# Patient Record
Sex: FEMALE | Race: WHITE | Employment: FULL TIME | URBAN - NONMETROPOLITAN AREA
[De-identification: names, ages, dates, MRNs, and addresses within clinical notes are randomized per-mention and may not be internally consistent; named-entity substitution may affect disease eponyms.]

---

## 2017-01-04 ENCOUNTER — TELEPHONE (OUTPATIENT)
Dept: NEUROLOGY | Age: 62
End: 2017-01-04

## 2017-02-06 ENCOUNTER — OFFICE VISIT (OUTPATIENT)
Dept: NEUROLOGY | Age: 62
End: 2017-02-06
Payer: COMMERCIAL

## 2017-02-06 VITALS
BODY MASS INDEX: 29.82 KG/M2 | DIASTOLIC BLOOD PRESSURE: 74 MMHG | HEART RATE: 82 BPM | RESPIRATION RATE: 16 BRPM | WEIGHT: 179 LBS | HEIGHT: 65 IN | SYSTOLIC BLOOD PRESSURE: 117 MMHG

## 2017-02-06 DIAGNOSIS — Z99.89 CPAP (CONTINUOUS POSITIVE AIRWAY PRESSURE) DEPENDENCE: ICD-10-CM

## 2017-02-06 DIAGNOSIS — G47.33 OBSTRUCTIVE SLEEP APNEA: Primary | ICD-10-CM

## 2017-02-06 PROCEDURE — 99213 OFFICE O/P EST LOW 20 MIN: CPT | Performed by: PHYSICIAN ASSISTANT

## 2017-02-06 RX ORDER — LOPERAMIDE HYDROCHLORIDE 2 MG/1
2 CAPSULE ORAL PRN
COMMUNITY

## 2017-02-21 ENCOUNTER — OFFICE VISIT (OUTPATIENT)
Dept: GASTROENTEROLOGY | Facility: CLINIC | Age: 62
End: 2017-02-21

## 2017-02-21 VITALS
HEART RATE: 70 BPM | SYSTOLIC BLOOD PRESSURE: 138 MMHG | BODY MASS INDEX: 29.82 KG/M2 | OXYGEN SATURATION: 74 % | WEIGHT: 179 LBS | DIASTOLIC BLOOD PRESSURE: 72 MMHG | HEIGHT: 65 IN

## 2017-02-21 DIAGNOSIS — R19.7 DIARRHEA, UNSPECIFIED TYPE: Primary | ICD-10-CM

## 2017-02-21 PROCEDURE — 99213 OFFICE O/P EST LOW 20 MIN: CPT | Performed by: INTERNAL MEDICINE

## 2017-02-21 RX ORDER — SODIUM, POTASSIUM,MAG SULFATES 17.5-3.13G
2 SOLUTION, RECONSTITUTED, ORAL ORAL ONCE
Qty: 1 BOTTLE | Refills: 0 | Status: SHIPPED | OUTPATIENT
Start: 2017-02-21 | End: 2017-02-21

## 2017-02-21 NOTE — PROGRESS NOTES
[facility] - Office note  2/21/2017    Edie Oconnor 1955     Chief Complaint   Patient presents with   • GI Problem     Here to discuss diarrhea       HISTORY OF PRESENT ILLNESS    Edie Oconnor is a 61 y.o. female who presents here for evaluation of diarrhea.  Symptoms have persisted.  She returned positive for Campylobacter completed the antibiotics and is continuing to have symptoms.  Essentially having diarrhea daily.  Had been on Celebrex which is been discontinued.  It is not been any blood in her stool.  His been no fever or chills.     Past Medical History   Diagnosis Date   • Carotid occlusion, left    • Depression    • Diverticulitis    • H/O Bell's palsy      Right   • Hiatal hernia    • Neuromuscular disorder      Neuropathy   • TIA (transient ischemic attack)        Past Surgical History   Procedure Laterality Date   • Hysterectomy     • Appendectomy     • Cardiac catheterization     • Elbow open reduction internal fixation Left    • Cholecystectomy     • Upper gastrointestinal endoscopy  08/08/2016     hiatla hernia, [place] Grade B reflux esophagitis   • Colonoscopy  12/10/2012     diverticula of sigmoid, tubular adenoma         Current Outpatient Prescriptions:   •  aspirin  MG tablet, Take 325 mg by mouth Daily., Disp: , Rfl:   •  atorvastatin (LIPITOR) 10 MG tablet, Take 10 mg by mouth Daily., Disp: , Rfl:   •  celecoxib (CeleBREX) 200 MG capsule, Take 200 mg by mouth Daily., Disp: , Rfl:   •  dicyclomine (BENTYL) 20 MG tablet, Take 1 tablet by mouth Every 6 (Six) Hours., Disp: 120 tablet, Rfl: 10  •  esomeprazole (NexIUM) 40 MG capsule, Take 40 mg by mouth Every Morning Before Breakfast., Disp: , Rfl:   •  lisinopril (PRINIVIL,ZESTRIL) 10 MG tablet, Take 10 mg by mouth Daily., Disp: , Rfl:   •  loperamide (IMODIUM) 2 MG capsule, Take 2 mg by mouth As Needed for diarrhea., Disp: , Rfl:   •  sucralfate (CARAFATE) 1 G tablet, Take 1 g by mouth 4 (Four) Times a Day.,  "Disp: , Rfl:   •  sodium-potassium-magnesium sulfates (SUPREP BOWEL PREP) solution oral solution, Take 2 bottles by mouth 1 (One) Time for 1 dose. As directed by office instructions provided., Disp: 1 bottle, Rfl: 0    Allergies   Allergen Reactions   • Prochlorperazine        Social History     Social History   • Marital status:      Spouse name: N/A   • Number of children: N/A   • Years of education: N/A     Occupational History   • Not on file.     Social History Main Topics   • Smoking status: Never Smoker   • Smokeless tobacco: Never Used   • Alcohol use Yes      Comment: rarely   • Drug use: No   • Sexual activity: Not on file     Other Topics Concern   • Not on file     Social History Narrative       Family History   Problem Relation Age of Onset   • Diabetes Mother    • Hypertension Mother    • Cancer Father    • Heart disease Sister    • Cancer Brother    • Colon polyps Brother    • Colon cancer Neg Hx        Review of Systems   Constitutional: Negative.    HENT: Negative.    Eyes: Negative.    Respiratory: Negative.    Cardiovascular: Negative.    Gastrointestinal: Positive for diarrhea. Negative for abdominal distention, abdominal pain, blood in stool and nausea.   Genitourinary: Negative.    Musculoskeletal: Negative.    Neurological: Negative.    Hematological: Negative.        Vitals:    02/21/17 1353   BP: 138/72   Pulse: 70   SpO2: (!) 74%   Weight: 179 lb (81.2 kg)   Height: 65\" (165.1 cm)    Body mass index is 29.79 kg/(m^2).    Physical Exam   Constitutional: She is oriented to person, place, and time. She appears well-developed and well-nourished.   Eyes: Pupils are equal, round, and reactive to light.   Cardiovascular: Normal rate and regular rhythm.    Pulmonary/Chest: Effort normal and breath sounds normal.   Abdominal: Soft. Bowel sounds are normal.   Neurological: She is alert and oriented to person, place, and time.   Skin: Skin is warm and dry.         ASSESSMENT AND PLAN      1. " Diarrhea, unspecified type  I will recheck a stool for infection or think this is unlikely at this time.  Need to consider microscopic colitis and she will need repeat colonoscopy of the right colonic biopsies.    - Gastrointestinal Panel, PCR; Future  - Case request     EMR Dragon/transcription disclaimer: Much of this encounter note is an electronic transcription/translation of spoken language to printed text.  The electronic translation of spoken language may permit erroneous, or at times, nonsensical words or phrases to be inadvertently transcribed.  Although I have reviewed the note for such errors, some may still exist.    Dom Parker MD  2/21/2017  2:20 PM

## 2017-02-22 ENCOUNTER — LAB (OUTPATIENT)
Dept: LAB | Facility: HOSPITAL | Age: 62
End: 2017-02-22
Attending: INTERNAL MEDICINE

## 2017-02-22 DIAGNOSIS — R19.7 DIARRHEA, UNSPECIFIED TYPE: ICD-10-CM

## 2017-02-22 LAB
ADV 40+41 DNA STL QL NAA+NON-PROBE: NOT DETECTED
ASTRO TYP 1-8 RNA STL QL NAA+NON-PROBE: NOT DETECTED
C CAYETANENSIS DNA STL QL NAA+NON-PROBE: NOT DETECTED
C DIFF TOX GENS STL QL NAA+PROBE: NOT DETECTED
CAMPY SP DNA.DIARRHEA STL QL NAA+PROBE: NOT DETECTED
CRYPTOSP STL CULT: NOT DETECTED
E COLI DNA SPEC QL NAA+PROBE: NOT DETECTED
E HISTOLYT AG STL-ACNC: NOT DETECTED
EAEC PAA PLAS AGGR+AATA ST NAA+NON-PRB: NOT DETECTED
EC STX1+STX2 GENES STL QL NAA+NON-PROBE: NOT DETECTED
EPEC EAE GENE STL QL NAA+NON-PROBE: NOT DETECTED
ETEC LTA+ST1A+ST1B TOX ST NAA+NON-PROBE: NOT DETECTED
G LAMBLIA DNA SPEC QL NAA+PROBE: NOT DETECTED
NOROVIRUS GI+II RNA STL QL NAA+NON-PROBE: NOT DETECTED
P SHIGELLOIDES DNA STL QL NAA+NON-PROBE: NOT DETECTED
RV RNA STL NAA+PROBE: NOT DETECTED
SALMONELLA DNA SPEC QL NAA+PROBE: NOT DETECTED
SAPO I+II+IV+V RNA STL QL NAA+NON-PROBE: NOT DETECTED
SHIGELLA SP+EIEC IPAH ST NAA+NON-PROBE: NOT DETECTED
V CHOLERAE DNA SPEC QL NAA+PROBE: NOT DETECTED
VIBRIO DNA SPEC NAA+PROBE: NOT DETECTED
YERSINIA STL CULT: NOT DETECTED

## 2017-02-22 PROCEDURE — 87507 IADNA-DNA/RNA PROBE TQ 12-25: CPT

## 2017-02-23 ENCOUNTER — ANESTHESIA EVENT (OUTPATIENT)
Dept: GASTROENTEROLOGY | Facility: HOSPITAL | Age: 62
End: 2017-02-23

## 2017-02-24 ENCOUNTER — ANESTHESIA (OUTPATIENT)
Dept: GASTROENTEROLOGY | Facility: HOSPITAL | Age: 62
End: 2017-02-24

## 2017-02-24 ENCOUNTER — HOSPITAL ENCOUNTER (OUTPATIENT)
Facility: HOSPITAL | Age: 62
Setting detail: HOSPITAL OUTPATIENT SURGERY
Discharge: HOME | End: 2017-02-24
Attending: INTERNAL MEDICINE | Admitting: INTERNAL MEDICINE

## 2017-02-24 VITALS
SYSTOLIC BLOOD PRESSURE: 99 MMHG | BODY MASS INDEX: 28.82 KG/M2 | DIASTOLIC BLOOD PRESSURE: 72 MMHG | HEIGHT: 65 IN | RESPIRATION RATE: 16 BRPM | TEMPERATURE: 98.6 F | OXYGEN SATURATION: 96 % | HEART RATE: 78 BPM | WEIGHT: 173 LBS

## 2017-02-24 DIAGNOSIS — R19.7 DIARRHEA, UNSPECIFIED TYPE: ICD-10-CM

## 2017-02-24 PROCEDURE — 88305 TISSUE EXAM BY PATHOLOGIST: CPT | Performed by: INTERNAL MEDICINE

## 2017-02-24 PROCEDURE — 25010000002 PROPOFOL 10 MG/ML EMULSION: Performed by: NURSE ANESTHETIST, CERTIFIED REGISTERED

## 2017-02-24 PROCEDURE — 45385 COLONOSCOPY W/LESION REMOVAL: CPT | Performed by: INTERNAL MEDICINE

## 2017-02-24 PROCEDURE — 45380 COLONOSCOPY AND BIOPSY: CPT | Performed by: INTERNAL MEDICINE

## 2017-02-24 RX ORDER — SODIUM CHLORIDE 9 MG/ML
100 INJECTION, SOLUTION INTRAVENOUS CONTINUOUS
Status: DISCONTINUED | OUTPATIENT
Start: 2017-02-24 | End: 2017-02-24 | Stop reason: HOSPADM

## 2017-02-24 RX ORDER — SODIUM CHLORIDE 0.9 % (FLUSH) 0.9 %
1-10 SYRINGE (ML) INJECTION AS NEEDED
Status: DISCONTINUED | OUTPATIENT
Start: 2017-02-24 | End: 2017-02-24 | Stop reason: HOSPADM

## 2017-02-24 RX ORDER — PROPOFOL 10 MG/ML
VIAL (ML) INTRAVENOUS AS NEEDED
Status: DISCONTINUED | OUTPATIENT
Start: 2017-02-24 | End: 2017-02-24 | Stop reason: SURG

## 2017-02-24 RX ADMIN — SODIUM CHLORIDE 100 ML/HR: 9 INJECTION, SOLUTION INTRAVENOUS at 10:17

## 2017-02-24 RX ADMIN — PROPOFOL 50 MG: 10 INJECTION, EMULSION INTRAVENOUS at 11:49

## 2017-02-24 RX ADMIN — PROPOFOL 50 MG: 10 INJECTION, EMULSION INTRAVENOUS at 11:52

## 2017-02-24 RX ADMIN — PROPOFOL 5 MG: 10 INJECTION, EMULSION INTRAVENOUS at 11:46

## 2017-02-24 RX ADMIN — PROPOFOL 50 MG: 10 INJECTION, EMULSION INTRAVENOUS at 11:43

## 2017-02-24 NOTE — ANESTHESIA POSTPROCEDURE EVALUATION
Patient: Edie Oconnor    Procedure Summary     Date Anesthesia Start Anesthesia Stop Room / Location    02/24/17 1141 1159 [facility] ENDOSCOPY 2 / [facility] ENDOSCOPY       Procedure Diagnosis Surgeon Provider    COLONOSCOPY WITH ANESTHESIA (N/A ) Diarrhea, unspecified type  (Diarrhea, unspecified type [R19.7]) MD Farooq Chan CRNA          Anesthesia Type: general, MAC  Last vitals  BP      Temp      Pulse     Resp      SpO2        Post Anesthesia Care and Evaluation    Patient location during evaluation: PACU  Patient participation: complete - patient participated  Level of consciousness: awake and alert  Pain score: 0  Pain management: adequate  Airway patency: patent  Anesthetic complications: No anesthetic complications    Cardiovascular status: acceptable and stable  Respiratory status: acceptable  Hydration status: acceptable

## 2017-02-24 NOTE — ANESTHESIA PREPROCEDURE EVALUATION
Anesthesia Evaluation     Patient summary reviewed and Nursing notes reviewed   NPO Status: > 6 hours   Airway   Mallampati: II  no difficulty expected  Dental      Pulmonary - negative pulmonary ROS   Cardiovascular   Exercise tolerance: good (4-7 METS)    (+) PVD,   (-) hypertension, CAD      Neuro/Psych  (+) TIA, numbness, psychiatric history,    GI/Hepatic/Renal/Endo    (+)  hiatal hernia,     Musculoskeletal     Abdominal    Substance History      OB/GYN          Other                                    Anesthesia Plan    ASA 2     general and MAC     intravenous induction   Anesthetic plan and risks discussed with patient.

## 2017-02-28 LAB
LAB AP CASE REPORT: NORMAL
LAB AP CLINICAL INFORMATION: NORMAL
Lab: NORMAL
PATH REPORT.FINAL DX SPEC: NORMAL
PATH REPORT.GROSS SPEC: NORMAL

## 2017-05-19 ENCOUNTER — OFFICE VISIT (OUTPATIENT)
Dept: NEUROLOGY | Facility: CLINIC | Age: 62
End: 2017-05-19

## 2017-05-19 VITALS
HEIGHT: 65 IN | SYSTOLIC BLOOD PRESSURE: 104 MMHG | DIASTOLIC BLOOD PRESSURE: 78 MMHG | BODY MASS INDEX: 29.32 KG/M2 | HEART RATE: 68 BPM | WEIGHT: 176 LBS

## 2017-05-19 DIAGNOSIS — G47.33 OSA ON CPAP: ICD-10-CM

## 2017-05-19 DIAGNOSIS — Z99.89 OSA ON CPAP: ICD-10-CM

## 2017-05-19 DIAGNOSIS — E78.5 DYSLIPIDEMIA: ICD-10-CM

## 2017-05-19 DIAGNOSIS — I10 ESSENTIAL HYPERTENSION: ICD-10-CM

## 2017-05-19 DIAGNOSIS — G45.9 TRANSIENT CEREBRAL ISCHEMIA, UNSPECIFIED TYPE: Primary | ICD-10-CM

## 2017-05-19 PROCEDURE — 99213 OFFICE O/P EST LOW 20 MIN: CPT | Performed by: CLINICAL NURSE SPECIALIST

## 2017-08-07 ENCOUNTER — OFFICE VISIT (OUTPATIENT)
Dept: NEUROLOGY | Age: 62
End: 2017-08-07
Payer: COMMERCIAL

## 2017-08-07 VITALS
BODY MASS INDEX: 29.41 KG/M2 | DIASTOLIC BLOOD PRESSURE: 82 MMHG | HEIGHT: 65 IN | OXYGEN SATURATION: 94 % | WEIGHT: 176.5 LBS | SYSTOLIC BLOOD PRESSURE: 125 MMHG | HEART RATE: 65 BPM

## 2017-08-07 DIAGNOSIS — Z99.89 CPAP (CONTINUOUS POSITIVE AIRWAY PRESSURE) DEPENDENCE: ICD-10-CM

## 2017-08-07 DIAGNOSIS — G47.33 OBSTRUCTIVE SLEEP APNEA: Primary | ICD-10-CM

## 2017-08-07 PROCEDURE — 99213 OFFICE O/P EST LOW 20 MIN: CPT | Performed by: PHYSICIAN ASSISTANT

## 2017-08-07 RX ORDER — OMEPRAZOLE 20 MG/1
20 CAPSULE, DELAYED RELEASE ORAL DAILY
COMMUNITY
End: 2021-08-11 | Stop reason: ALTCHOICE

## 2018-05-25 ENCOUNTER — OFFICE VISIT (OUTPATIENT)
Dept: NEUROLOGY | Facility: CLINIC | Age: 63
End: 2018-05-25

## 2018-05-25 VITALS
WEIGHT: 177 LBS | HEIGHT: 65 IN | SYSTOLIC BLOOD PRESSURE: 122 MMHG | BODY MASS INDEX: 29.49 KG/M2 | DIASTOLIC BLOOD PRESSURE: 78 MMHG | HEART RATE: 72 BPM

## 2018-05-25 DIAGNOSIS — Z99.89 OSA ON CPAP: ICD-10-CM

## 2018-05-25 DIAGNOSIS — G45.9 TRANSIENT CEREBRAL ISCHEMIA, UNSPECIFIED TYPE: Primary | ICD-10-CM

## 2018-05-25 DIAGNOSIS — I10 ESSENTIAL HYPERTENSION: ICD-10-CM

## 2018-05-25 DIAGNOSIS — I65.23 BILATERAL CAROTID ARTERY STENOSIS: ICD-10-CM

## 2018-05-25 DIAGNOSIS — E78.5 DYSLIPIDEMIA: ICD-10-CM

## 2018-05-25 DIAGNOSIS — G47.33 OSA ON CPAP: ICD-10-CM

## 2018-05-25 PROCEDURE — 99214 OFFICE O/P EST MOD 30 MIN: CPT | Performed by: CLINICAL NURSE SPECIALIST

## 2018-05-25 RX ORDER — ESOMEPRAZOLE MAGNESIUM 40 MG/1
40 CAPSULE, DELAYED RELEASE ORAL
COMMUNITY
End: 2019-03-26

## 2018-05-25 NOTE — PROGRESS NOTES
Subjective     Chief Complaint   Patient presents with   • Stroke       Edie Oconnor is a 63 y.o. female right handed works for insurance company.  She is here today for follow up for TIA. She was last seen 5/2017.  She denies unilateral weakness/numbness, facial weakness, speech or vision changes. She continues ASA with out problems. Patient was not able to tolerate statin.  She did have labs done by PCP but not available to me . She is unsure of LDL.  No falls. She did have carotid duplex 12/2016 at [facility] that showed less than 50% bilateral. She would like me to take over survielance of carotid stenosis. She sees Dr. Alcantara for LAN and wears CPAP. Patient has no new complaints.     Stroke   This is a chronic (TIA in March 2016) problem. Episode onset: 3/2016. The problem has been resolved. Pertinent negatives include no arthralgias, myalgias, numbness or sore throat. Associated symptoms comments: Right facial droop/drooling and resolved RTB and no new symptoms since last follow up. Exacerbated by: HTN, dyslipid. Treatments tried: ASA, statin(but statin d/c secondary to worsesing RLS) The treatment provided significant relief.        Current Outpatient Prescriptions   Medication Sig Dispense Refill   • aspirin  MG tablet Take 325 mg by mouth Daily.     • celecoxib (CeleBREX) 200 MG capsule Take 200 mg by mouth As Needed.     • dicyclomine (BENTYL) 20 MG tablet Take 1 tablet by mouth Every 6 (Six) Hours. 120 tablet 10   • esomeprazole (nexIUM) 40 MG capsule Take 40 mg by mouth Every Morning Before Breakfast.     • lisinopril (PRINIVIL,ZESTRIL) 10 MG tablet Take 10 mg by mouth Daily.     • loperamide (IMODIUM) 2 MG capsule Take 2 mg by mouth As Needed for diarrhea.     • sucralfate (CARAFATE) 1 G tablet Take 1 g by mouth 4 (Four) Times a Day.       No current facility-administered medications for this visit.        Past Medical History:   Diagnosis Date   • Carotid occlusion, left    • Depression    •  "Diverticulitis    • H/O Bell's palsy     Right   • Hiatal hernia    • Neuromuscular disorder     Neuropathy   • PONV (postoperative nausea and vomiting)    • Sleep apnea    • TIA (transient ischemic attack)        Past Surgical History:   Procedure Laterality Date   • APPENDECTOMY     • CARDIAC CATHETERIZATION     • CHOLECYSTECTOMY     • COLONOSCOPY  12/10/2012    diverticula of sigmoid, tubular adenoma   • COLONOSCOPY N/A 2/24/2017    Procedure: COLONOSCOPY WITH ANESTHESIA;  Surgeon: Dom Parker MD;  Location: [facility] ENDOSCOPY;  Service:    • ELBOW OPEN REDUCTION INTERNAL FIXATION Left    • HYSTERECTOMY     • UPPER GASTROINTESTINAL ENDOSCOPY  08/08/2016    hiatla hernia, [place] Grade B reflux esophagitis       family history includes Cancer in her brother and father; Colon polyps in her brother; Diabetes in her mother; Heart disease in her sister; Hypertension in her mother.    Social History   Substance Use Topics   • Smoking status: Never Smoker   • Smokeless tobacco: Never Used   • Alcohol use Yes      Comment: rarely       Review of Systems   Constitutional: Negative.    HENT: Negative.  Negative for postnasal drip, rhinorrhea and sore throat.    Eyes: Negative.    Respiratory: Negative.  Negative for choking and chest tightness.    Cardiovascular: Negative.    Gastrointestinal: Negative for constipation and diarrhea.   Endocrine: Negative.    Genitourinary: Negative.  Negative for dysuria and frequency.   Musculoskeletal: Negative for arthralgias and myalgias.   Skin: Negative.    Allergic/Immunologic: Negative.    Neurological: Negative for speech difficulty and numbness.   Hematological: Negative.  Negative for adenopathy.   Psychiatric/Behavioral: Negative.  Negative for agitation and hallucinations.   All other systems reviewed and are negative.      Objective     /78   Pulse 72   Ht 165.1 cm (65\")   Wt 80.3 kg (177 lb)   BMI 29.45 kg/m² , Body mass index is 29.45 kg/m².    Physical Exam "   Constitutional: She is oriented to person, place, and time. Vital signs are normal. She appears well-developed and well-nourished. She is cooperative.   HENT:   Head: Normocephalic and atraumatic.   Right Ear: Hearing and external ear normal.   Left Ear: Hearing and external ear normal.   Nose: Nose normal.   Mouth/Throat: Oropharynx is clear and moist.   Eyes: EOM and lids are normal. Pupils are equal, round, and reactive to light. Right eye exhibits normal extraocular motion and no nystagmus. Left eye exhibits normal extraocular motion and no nystagmus. Right pupil is round and reactive. Left pupil is round and reactive. Pupils are equal.   Neck: Neck supple. Carotid bruit is not present.   Cardiovascular: Normal rate, regular rhythm, S1 normal, S2 normal and normal heart sounds.    No murmur heard.  Pulmonary/Chest: Effort normal and breath sounds normal. She has no decreased breath sounds. She has no wheezes. She has no rhonchi. She has no rales.   Abdominal: Soft. Bowel sounds are normal.   Musculoskeletal: Normal range of motion.   Neurological: She is alert and oriented to person, place, and time. She has normal strength and normal reflexes. She displays no tremor. No cranial nerve deficit or sensory deficit. She displays a negative Romberg sign. She displays no seizure activity. Coordination (no ataxia/tremor) and gait normal.   Reflex Scores:       Tricep reflexes are 2+ on the right side and 2+ on the left side.       Bicep reflexes are 2+ on the right side and 2+ on the left side.       Brachioradialis reflexes are 2+ on the right side and 2+ on the left side.       Patellar reflexes are 2+ on the right side and 2+ on the left side.       Achilles reflexes are 2+ on the right side and 2+ on the left side.  Awake, alert. No aphasia, no dysarthria  Completes simple and complex commands    CN II:  Visual fields full.  Pupils equally reactive to light  CN III, IV, VI:  Extraocular Muscles full with no signs  of nystagmus  CN V:  Facial sensory is symmetric with no asymetries.  CN VII:  Facial motor symmetric  CN VIII:  Gross hearing intact bilaterally  CN IX:  Palate elevates symmetrically  CN X:  Palate elevates symmetrically  CN XI:  Shoulder shrug symmetric  CN XII:  Tongue is midline on protrusion    Full and symmetric strength bilateral upper and lower extremities.    NIHSS = 0   Skin: Skin is warm and dry.   Psychiatric: She has a normal mood and affect. Her speech is normal and behavior is normal. Cognition and memory are normal.   Nursing note and vitals reviewed.           ASSESSMENT/PLAN    Diagnoses and all orders for this visit:    Transient cerebral ischemia, unspecified type  -     US Carotid Bilateral; Future    Essential hypertension    LAN on CPAP    Dyslipidemia    Bilateral carotid artery stenosis  -     US Carotid Bilateral; Future    Other orders  -     esomeprazole (nexIUM) 40 MG capsule; Take 40 mg by mouth Every Morning Before Breakfast.          MEDICAL DECISION MAKIN. Continue with  mg daily for secondary stroke prevention.  2. Patient intolerant of statins  3. bp managed by PCP and to maintain systolic less than 130.  4. Patient is counseled on stroke signs and symptoms using FAST and Time Saved is Brain Saved.  5, will get Carotid duplex scan for surveillance of bilateral carotid stenosis.  6.Patient's Body mass index is 29.45 kg/m². BMI is above normal parameters. Recommendations include: educational material.      7. Continue with CPAP per Dr. Alcantara recommendation.     allergies and all known medications/prescriptions have been reviewed using resources available on this encounter.    Return in about 1 year (around 2019).        JONEL Banuelos

## 2018-05-25 NOTE — PATIENT INSTRUCTIONS
Exercising to Lose Weight  Exercising can help you to lose weight. In order to lose weight through exercise, you need to do vigorous-intensity exercise. You can tell that you are exercising with vigorous intensity if you are breathing very hard and fast and cannot hold a conversation while exercising.  Moderate-intensity exercise helps to maintain your current weight. You can tell that you are exercising at a moderate level if you have a higher heart rate and faster breathing, but you are still able to hold a conversation.  How often should I exercise?  Choose an activity that you enjoy and set realistic goals. Your health care provider can help you to make an activity plan that works for you. Exercise regularly as directed by your health care provider. This may include:  · Doing resistance training twice each week, such as:  ¨ Push-ups.  ¨ Sit-ups.  ¨ Lifting weights.  ¨ Using resistance bands.  · Doing a given intensity of exercise for a given amount of time. Choose from these options:  ¨ 150 minutes of moderate-intensity exercise every week.  ¨ 75 minutes of vigorous-intensity exercise every week.  ¨ A mix of moderate-intensity and vigorous-intensity exercise every week.  Children, pregnant women, people who are out of shape, people who are overweight, and older adults may need to consult a health care provider for individual recommendations. If you have any sort of medical condition, be sure to consult your health care provider before starting a new exercise program.  What are some activities that can help me to lose weight?  · Walking at a rate of at least 4.5 miles an hour.  · Jogging or running at a rate of 5 miles per hour.  · Biking at a rate of at least 10 miles per hour.  · Lap swimming.  · Roller-skating or in-line skating.  · Cross-country skiing.  · Vigorous competitive sports, such as football, basketball, and soccer.  · Jumping rope.  · Aerobic dancing.  How can I be more active in my day-to-day  activities?  · Use the stairs instead of the elevator.  · Take a walk during your lunch break.  · If you drive, park your car farther away from work or school.  · If you take public transportation, get off one stop early and walk the rest of the way.  · Make all of your phone calls while standing up and walking around.  · Get up, stretch, and walk around every 30 minutes throughout the day.  What guidelines should I follow while exercising?  · Do not exercise so much that you hurt yourself, feel dizzy, or get very short of breath.  · Consult your health care provider prior to starting a new exercise program.  · Wear comfortable clothes and shoes with good support.  · Drink plenty of water while you exercise to prevent dehydration or heat stroke. Body water is lost during exercise and must be replaced.  · Work out until you breathe faster and your heart beats faster.  This information is not intended to replace advice given to you by your health care provider. Make sure you discuss any questions you have with your health care provider.  Document Released: 01/20/2012 Document Revised: 05/25/2017 Document Reviewed: 05/21/2015  [employer] Interactive Patient Education © 2017 [employer]  Calorie Counting for Weight Loss  Calories are units of energy. Your body needs a certain amount of calories from food to keep you going throughout the day. When you eat more calories than your body needs, your body stores the extra calories as fat. When you eat fewer calories than your body needs, your body burns fat to get the energy it needs.  Calorie counting means keeping track of how many calories you eat and drink each day. Calorie counting can be helpful if you need to lose weight. If you make sure to eat fewer calories than your body needs, you should lose weight. Ask your health care provider what a healthy weight is for you.  For calorie counting to work, you will need to eat the right number of calories in a day in order to  lose a healthy amount of weight per week. A dietitian can help you determine how many calories you need in a day and will give you suggestions on how to reach your calorie goal.  · A healthy amount of weight to lose per week is usually 1-2 lb (0.5-0.9 kg). This usually means that your daily calorie intake should be reduced by 500-750 calories.  · Eating 1,200 - 1,500 calories per day can help most women lose weight.  · Eating 1,500 - 1,800 calories per day can help most men lose weight.  What is my plan?  My goal is to have __________ calories per day.  If I have this many calories per day, I should lose around __________ pounds per week.  What do I need to know about calorie counting?  In order to meet your daily calorie goal, you will need to:  · Find out how many calories are in each food you would like to eat. Try to do this before you eat.  · Decide how much of the food you plan to eat.  · Write down what you ate and how many calories it had. Doing this is called keeping a food log.  To successfully lose weight, it is important to balance calorie counting with a healthy lifestyle that includes regular activity. Aim for 150 minutes of moderate exercise (such as walking) or 75 minutes of vigorous exercise (such as running) each week.  Where do I find calorie information?     The number of calories in a food can be found on a Nutrition Facts label. If a food does not have a Nutrition Facts label, try to look up the calories online or ask your dietitian for help.  Remember that calories are listed per serving. If you choose to have more than one serving of a food, you will have to multiply the calories per serving by the amount of servings you plan to eat. For example, the label on a package of bread might say that a serving size is 1 slice and that there are 90 calories in a serving. If you eat 1 slice, you will have eaten 90 calories. If you eat 2 slices, you will have eaten 180 calories.  How do I keep a food  "log?  Immediately after each meal, record the following information in your food log:  · What you ate. Don't forget to include toppings, sauces, and other extras on the food.  · How much you ate. This can be measured in cups, ounces, or number of items.  · How many calories each food and drink had.  · The total number of calories in the meal.  Keep your food log near you, such as in a small notebook in your pocket, or use a mobile ze or website. Some programs will calculate calories for you and show you how many calories you have left for the day to meet your goal.  What are some calorie counting tips?  · Use your calories on foods and drinks that will fill you up and not leave you hungry:  ¨ Some examples of foods that fill you up are nuts and nut butters, vegetables, lean proteins, and high-fiber foods like whole grains. High-fiber foods are foods with more than 5 g fiber per serving.  ¨ Drinks such as sodas, specialty coffee drinks, alcohol, and juices have a lot of calories, yet do not fill you up.  · Eat nutritious foods and avoid empty calories. Empty calories are calories you get from foods or beverages that do not have many vitamins or protein, such as candy, sweets, and soda. It is better to have a nutritious high-calorie food (such as an avocado) than a food with few nutrients (such as a bag of chips).  · Know how many calories are in the foods you eat most often. This will help you calculate calorie counts faster.  · Pay attention to calories in drinks. Low-calorie drinks include water and unsweetened drinks.  · Pay attention to nutrition labels for \"low fat\" or \"fat free\" foods. These foods sometimes have the same amount of calories or more calories than the full fat versions. They also often have added sugar, starch, or salt, to make up for flavor that was removed with the fat.  · Find a way of tracking calories that works for you. Get creative. Try different apps or programs if writing down calories " does not work for you.  What are some portion control tips?  · Know how many calories are in a serving. This will help you know how many servings of a certain food you can have.  · Use a measuring cup to measure serving sizes. You could also try weighing out portions on a kitchen scale. With time, you will be able to estimate serving sizes for some foods.  · Take some time to put servings of different foods on your favorite plates, bowls, and cups so you know what a serving looks like.  · Try not to eat straight from a bag or box. Doing this can lead to overeating. Put the amount you would like to eat in a cup or on a plate to make sure you are eating the right portion.  · Use smaller plates, glasses, and bowls to prevent overeating.  · Try not to multitask (for example, watch TV or use your computer) while eating. If it is time to eat, sit down at a table and enjoy your food. This will help you to know when you are full. It will also help you to be aware of what you are eating and how much you are eating.  What are tips for following this plan?  Reading food labels   · Check the calorie count compared to the serving size. The serving size may be smaller than what you are used to eating.  · Check the source of the calories. Make sure the food you are eating is high in vitamins and protein and low in saturated and trans fats.  Shopping   · Read nutrition labels while you shop. This will help you make healthy decisions before you decide to purchase your food.  · Make a grocery list and stick to it.  Cooking   · Try to cook your favorite foods in a healthier way. For example, try baking instead of frying.  · Use low-fat dairy products.  Meal planning   · Use more fruits and vegetables. Half of your plate should be fruits and vegetables.  · Include lean proteins like poultry and fish.  How do I count calories when eating out?  · Ask for smaller portion sizes.  · Consider sharing an entree and sides instead of getting  your own entree.  · If you get your own entree, eat only half. Ask for a box at the beginning of your meal and put the rest of your entree in it so you are not tempted to eat it.  · If calories are listed on the menu, choose the lower calorie options.  · Choose dishes that include vegetables, fruits, whole grains, low-fat dairy products, and lean protein.  · Choose items that are boiled, broiled, grilled, or steamed. Stay away from items that are buttered, battered, fried, or served with cream sauce. Items labeled “crispy” are usually fried, unless stated otherwise.  · Choose water, low-fat milk, unsweetened iced tea, or other drinks without added sugar. If you want an alcoholic beverage, choose a lower calorie option such as a glass of wine or light beer.  · Ask for dressings, sauces, and syrups on the side. These are usually high in calories, so you should limit the amount you eat.  · If you want a salad, choose a garden salad and ask for grilled meats. Avoid extra toppings like phelan, cheese, or fried items. Ask for the dressing on the side, or ask for olive oil and vinegar or lemon to use as dressing.  · Estimate how many servings of a food you are given. For example, a serving of cooked rice is ½ cup or about the size of half a baseball. Knowing serving sizes will help you be aware of how much food you are eating at restaurants. The list below tells you how big or small some common portion sizes are based on everyday objects:  ¨ 1 oz--4 stacked dice.  ¨ 3 oz--1 deck of cards.  ¨ 1 tsp--1 die.  ¨ 1 Tbsp--½ a ping-pong ball.  ¨ 2 Tbsp--1 ping-pong ball.  ¨ ½ cup--½ baseball.  ¨ 1 cup--1 baseball.  Summary  · Calorie counting means keeping track of how many calories you eat and drink each day. If you eat fewer calories than your body needs, you should lose weight.  · A healthy amount of weight to lose per week is usually 1-2 lb (0.5-0.9 kg). This usually means reducing your daily calorie intake by 500-750  calories.  · The number of calories in a food can be found on a Nutrition Facts label. If a food does not have a Nutrition Facts label, try to look up the calories online or ask your dietitian for help.  · Use your calories on foods and drinks that will fill you up, and not on foods and drinks that will leave you hungry.  · Use smaller plates, glasses, and bowls to prevent overeating.  This information is not intended to replace advice given to you by your health care provider. Make sure you discuss any questions you have with your health care provider.  Document Released: 12/18/2006 Document Revised: 11/17/2017 Document Reviewed: 11/17/2017  [employer] Interactive Patient Education © 2017 [employer]

## 2018-06-19 ENCOUNTER — APPOINTMENT (OUTPATIENT)
Dept: ULTRASOUND IMAGING | Facility: HOSPITAL | Age: 63
End: 2018-06-19

## 2018-08-07 NOTE — PROGRESS NOTES
[facility] Sleep Follow Up Encounter      Information:   Patient Name: Troy Dexter  :   1955  Age:   58 y.o. MRN:   [id number]  Account #:  [de-identified]  Today:                18    Provider:  Yuval Valencia PA-C    Chief Complaint   Patient presents with    Follow-up     1 yr f/u, MELVIN, pt states some nights are great and some night she just cant use the machine        Subjective:   Troy Dexter is a 58 y.o. female  with a history of severe MELVIN who comes in for an annual sleep clinic follow up. The HST, 2016 revealed an AHI of 35.3.  She is prescribed auto CPAP therapy at a pressure of 7cm to 15.8cm. The compliance report indicates that she is averaging 6-7 hours of CPAP use per day. She averages 6.5 hours of sleep per night. She reports that consistent CPAP use has alleviated the previous MELVIN symptoms. She does have episodic insomnia and can only keep it on a few hours. She doesn't use it on vacation.      Location or symptom:  MELVIN  Onset:  Repeat PS2016  Timing:  q hs  Severity:  Severe  Associated:  Snoring, witnessed apneas, and excessive daytime somnolence  Alleviated:  CPAP      Objective:     Past Medical History:   Diagnosis Date    Bell's palsy     Chronic back pain     Colon polyp     CPAP (continuous positive airway pressure) dependence     7cm to 16cm    Diverticulitis     GERD (gastroesophageal reflux disease)     H/O diverticulitis of colon     Hiatal hernia     Hyperlipidemia     Hypertension     Irritable bowel syndrome     Neuropathy     Obstructive sleep apnea     AHI:  35.3 per HST, 2016    Restless legs syndrome     Rheumatoid aortitis     Sleep apnea     c-pap    TIA (transient ischemic attack)        Past Surgical History:   Procedure Laterality Date    APPENDECTOMY      CHOLECYSTECTOMY      ELBOW SURGERY Left 3/26/15    HYSTERECTOMY      total    UPPER GASTROINTESTINAL ENDOSCOPY         Recent Hospitalizations  ·     Significant Injuries  ·     Family records and/or obtain medical records     []  :  Previous/recent polysomnogram report(s)    []  :  Lutcher Sleepiness Scale      [x]  :  Compliance download: The auto CPAP is set at a pressure of 7cm to 16cm. Compliance download shows that she uses device: 53% of the time;  percentage of days with usage >=4 hours: 40%. AHI: 1.9    Assessment:       ICD-10-CM ICD-9-CM    1. Obstructive sleep apnea G47.33 327.23    2. CPAP (continuous positive airway pressure) dependence Z99.89 V46.8           [x]  :  Stable     []  :  Improved                       []  :  Well controlled              []  :  Resolving     []  :  Resolved     []  :  Inadequately controlled     []  :  Worsening     []  :  Additional workup planned    She is not compliant but there is low residual AHI with use. Plan:     No orders of the defined types were placed in this encounter. 1.   Patient advised of the etiology,  pathophysiology, diagnosis, treatment options, and risks of untreated MELVIN. Risks may include, but are not limited to  hypertension, coronary artery disease, diabetes, stroke, weight gain, impaired cognition, daytime somnolence,  and motor vehicle accidents. Advised to abstain from driving or operating heavy machinery when drowsy and the use of respiratory suppressants. 2.  The following educational material has been included in this visit after visit summary for your review: MELVIN/PAP guidelines-Discussed with the patient and all questions fully answered. 3.  The current medical regimen is effective;  continue present plan. 4.  Use CPAP consistently  5. Follow up in 1 year      Note:  A total of >50% (>8 minutes) of 15 minutes was spent discussing the pathophysiology and treatment and/or coordination of care of the above diagnoses.

## 2018-08-08 ENCOUNTER — OFFICE VISIT (OUTPATIENT)
Dept: NEUROLOGY | Age: 63
End: 2018-08-08
Payer: COMMERCIAL

## 2018-08-08 VITALS
OXYGEN SATURATION: 92 % | HEART RATE: 66 BPM | SYSTOLIC BLOOD PRESSURE: 120 MMHG | HEIGHT: 65 IN | DIASTOLIC BLOOD PRESSURE: 75 MMHG | WEIGHT: 175 LBS | BODY MASS INDEX: 29.16 KG/M2

## 2018-08-08 DIAGNOSIS — G47.33 OBSTRUCTIVE SLEEP APNEA: Primary | ICD-10-CM

## 2018-08-08 DIAGNOSIS — Z99.89 CPAP (CONTINUOUS POSITIVE AIRWAY PRESSURE) DEPENDENCE: ICD-10-CM

## 2018-08-08 PROCEDURE — 99213 OFFICE O/P EST LOW 20 MIN: CPT | Performed by: PHYSICIAN ASSISTANT

## 2018-08-08 NOTE — PATIENT INSTRUCTIONS
Patient education: Sleep apnea in adults       INTRODUCTION  Normally during sleep, air moves through the throat and in and out of the lungs at a regular rhythm. In a person with sleep apnea, air movement is periodically diminished or stopped. There are two types of sleep apnea: obstructive sleep apnea and central sleep apnea. In obstructive sleep apnea, breathing is abnormal because of narrowing or closure of the throat. In central sleep apnea, breathing is abnormal because of a change in the breathing control and rhythm. Sleep apnea is a serious condition that can affect a person's ability to safely perform normal daily activities and can affect long term health. Approximately 25 percent of adults are at risk for sleep apnea of some degree. Men are more commonly affected than women. Other risk factors include middle and older age, being overweight or obese, and having a small mouth and throat. This topic review focuses on the most common type of sleep apnea in adults, obstructive sleep apnea (MELVIN). HOW SLEEP APNEA OCCURS  The throat is surrounded by muscles that control the airway for speaking, swallowing, and breathing. During sleep, these muscles are less active, and this causes the throat to narrow. In most people, this narrowing does not affect breathing. In others, it can cause snoring, sometimes with reduced or completely blocked airflow. A completely blocked airway without airflow is called an obstructive apnea. Partial obstruction with diminished airflow is called a hypopnea. A person may have apnea and hypopnea during sleep. Insufficient breathing due to apnea or hypopnea causes oxygen levels to fall and carbon dioxide to rise. Because the airway is blocked, breathing faster or harder does not help to improve oxygen levels until the airway is reopened. Typically, the obstruction requires the person to awaken to activate the upper airway muscles.  Once the airway is opened, the person then takes While the treatment may seem uncomfortable, noisy, or bulky at first, most people accept the treatment after experiencing better sleep. However, difficulty with mask comfort and nasal congestion prevent up to 50 percent of people from using the treatment on a regular basis. Continued follow up with a healthcare provider helps to ensure that the treatment is effective and comfortable. Information from the CPAP machine is often used by physicians, therapists, and insurers to track the success of treatment. CPAP can be delivered with different features to improve comfort and solve problems that may come up during treatment. Changes in treatment may be needed if symptoms do not improve or if the persons condition changes, such as a gain or loss of weight. Adjust sleep position  Adjusting sleep position (to stay off the back) may help improve sleep quality in people who have MELVIN when sleeping on the back. However, this is difficult to maintain throughout the night and is rarely an adequate solution. Weight loss  Weight loss may be helpful for obese or overweight patients. Weight loss may be accomplished with dietary changes, exercise, and/or surgical treatment. However, it can be difficult to maintain weight loss; the five-year success of non-surgical weight loss is only 5 percent, meaning that 95 percent of people regain lost weight. Avoid alcohol and other sedatives  Alcohol can worsen sleepiness, potentially increasing the risk of accidents or injury. People with MELVIN are often counseled to drink little to no alcohol, even during the daytime. Similarly, people who take anti-anxiety medications or sedatives to sleep should speak with their healthcare provider about the safety of these medications. People with MELVIN must notify all healthcare providers, including surgeons, about their condition and the potential risks of being sedated.  People with MELVIN who are given anesthesia and/or pain medications require special management and close monitoring to reduce the risk of a blocked airway. Dental devices  A dental device, called an oral appliance or mandibular advancement device, can reposition the jaw (mandible), bringing the tongue and soft palate forward as well. This may relieve obstruction in some people. This treatment is excellent for reducing snoring, although the effect on MELVIN is sometimes more limited. As a result, dental devices are best used for mild cases of MELVIN when relief of snoring is the main goal. Failure to tolerate and accept CPAP is another indication for dental devices. While dental devices are not as effective as CPAP for MELVIN, some patients prefer a dental device to CPAP. Side effects of dental devices are generally minor but may include changes to the bite with prolonged use. Surgical treatment  Surgery is an alternative therapy for patients who cannot tolerate or do not improve with nonsurgical treatments such as CPAP or oral devices. Surgery can also be used in combination with other nonsurgical treatments. Surgical procedures reshape structures in the upper airways or surgically reposition bone or soft tissue. Uvulopalatopharyngoplasty (UPPP) removes the uvula and excessive tissue in the throat, including the tonsils, if present. Other procedures, such as maxillomandibular advancement (MMA), address both the upper and lower pharyngeal airway more globally. UPPP alone has limited success rates (less than 50 percent) and people can relapse (when MELVIN symptoms return after surgery). As a result, this surgery is only recommended in a minority of people and should be considered with caution. MMA may have a higher success rate, particularly in people with abnormal jaw (maxilla and mandible) anatomy, but it is the most complicated procedure. A newer surgical approach, nerve stimulation to protrude the tongue, has promising success rates in very selected people.   Tracheostomy creates a permanent opening in the neck. It is reserved for people with severe disease in whom less drastic measures have failed or are inappropriate. Although it is always successful in eliminating obstructive sleep apnea, tracheostomy requires significant lifestyle changes and carries some serious risks (eg, infection, bleeding, blockage). All surgical treatments require discussions about the goals of treatment, the expected outcomes, and potential complications. Hypoglossal nerve stimulator- \"Inspire\" device    WHERE TO GET MORE INFORMATION  Your healthcare provider is the best source of information for questions and concerns related to your medical problem. Organizations  American Sleep Apnea Association  Provides information about sleep apnea to the public, publishes a newsletter, and serves as an advocate for people with the disorder. [place], [address] [facility], [address] [facility]   Bg@[employer]. org   [employer]. org   Tel: [phone]   Fax: [facility] organization that works to [employer] and safety by promoting public understanding of sleep and sleep disorders. Supports sleep-related education, research, and advocacy; produces and distributes educational materials to the public and healthcare professionals; and offers postdoctoral fellowships and grants for sleep researchers. [address]   Roxanne@[employer]. org   SurferLive.at. org   Tel: [phone]   Fax: [phone]    Important information:  Medicare/private insurance CPAP/BiPAP/APAP requirements:  Medicare/private insurance has specific requirements for PAP compliance that must be met during the first 90 days of use to continue coverage for CPAP/BiPAP/APAP  from day 91 and beyond. The policy requires that patients use a PAP device 4 hours per 24 hour period, at least 70% of the time over a 30 day period.  This data must

## 2019-01-04 ENCOUNTER — TRANSCRIBE ORDERS (OUTPATIENT)
Dept: ADMINISTRATIVE | Facility: HOSPITAL | Age: 64
End: 2019-01-04

## 2019-01-04 DIAGNOSIS — I65.22 OCCLUSION AND STENOSIS OF LEFT CAROTID ARTERY: Primary | ICD-10-CM

## 2019-03-12 ENCOUNTER — HOSPITAL ENCOUNTER (OUTPATIENT)
Dept: ULTRASOUND IMAGING | Facility: HOSPITAL | Age: 64
Discharge: HOME | End: 2019-03-12
Admitting: FAMILY MEDICINE

## 2019-03-12 DIAGNOSIS — I65.22 OCCLUSION AND STENOSIS OF LEFT CAROTID ARTERY: ICD-10-CM

## 2019-03-12 PROCEDURE — 93880 EXTRACRANIAL BILAT STUDY: CPT

## 2019-03-12 PROCEDURE — 93880 EXTRACRANIAL BILAT STUDY: CPT | Performed by: SURGERY

## 2019-03-25 ENCOUNTER — TELEPHONE (OUTPATIENT)
Dept: VASCULAR SURGERY | Facility: CLINIC | Age: 64
End: 2019-03-25

## 2019-03-25 NOTE — TELEPHONE ENCOUNTER
Left message reminding Mrs Oconnor of her appointment for Tuesday, March 26th, 2019 at 845 am with Dr Treviño. Also advised if she had any questions or needed to reschedule to please call the office at [phone].

## 2019-03-26 ENCOUNTER — OFFICE VISIT (OUTPATIENT)
Dept: VASCULAR SURGERY | Facility: CLINIC | Age: 64
End: 2019-03-26

## 2019-03-26 VITALS
HEART RATE: 64 BPM | DIASTOLIC BLOOD PRESSURE: 78 MMHG | OXYGEN SATURATION: 99 % | SYSTOLIC BLOOD PRESSURE: 116 MMHG | WEIGHT: 163 LBS | HEIGHT: 65 IN | BODY MASS INDEX: 27.16 KG/M2

## 2019-03-26 DIAGNOSIS — I65.23 BILATERAL CAROTID ARTERY STENOSIS: Primary | ICD-10-CM

## 2019-03-26 DIAGNOSIS — E78.5 DYSLIPIDEMIA: ICD-10-CM

## 2019-03-26 DIAGNOSIS — I10 ESSENTIAL HYPERTENSION: ICD-10-CM

## 2019-03-26 PROCEDURE — 99214 OFFICE O/P EST MOD 30 MIN: CPT | Performed by: NURSE PRACTITIONER

## 2019-03-28 NOTE — PROGRESS NOTES
03/26/2019      Collette Xiong,   [address]  [address]  [place] [address]    Edie Oconnor  1955    Chief Complaint   Patient presents with   • Establish Care     Bilateral carotid stenosis.  Referred by Dr. Collette Xiong       Dear Collette Xiong, *:      HPI  I had the pleasure of seeing your patient Edie Oconnor in the office today.  Thank you kindly for this consultation.  As you recall, Edie Oconnor is a 64 y.o.  female who you are currently following for routine health maintenance.  She is being referred for carotid artery stenosis.  She did previously follow with Dr. Valadez, but has not been seen there since 2016.  She denies any strokelike symptoms.  She is maintained on aspirin.  She denies any claudication to her lower extremities.  She does have a history of TIA.  She did have noninvasive testing performed, which I did review in office.    Past Medical History:   Diagnosis Date   • Depression    • Diverticulitis    • H/O Bell's palsy     Right   • Hiatal hernia    • Neuromuscular disorder (CMS/HCC)     Neuropathy   • PONV (postoperative nausea and vomiting)    • Sleep apnea    • TIA (transient ischemic attack)        Past Surgical History:   Procedure Laterality Date   • APPENDECTOMY     • CARDIAC CATHETERIZATION     • CHOLECYSTECTOMY     • COLONOSCOPY  12/10/2012    diverticula of sigmoid, tubular adenoma   • COLONOSCOPY N/A 2/24/2017    Procedure: COLONOSCOPY WITH ANESTHESIA;  Surgeon: Dom Parker MD;  Location: [facility] ENDOSCOPY;  Service:    • ELBOW OPEN REDUCTION INTERNAL FIXATION Left    • HYSTERECTOMY     • UPPER GASTROINTESTINAL ENDOSCOPY  08/08/2016    hiatla hernia, [place] Grade B reflux esophagitis       Family History   Problem Relation Age of Onset   • Diabetes Mother    • Hypertension Mother    • Cancer Father    • Heart disease Sister    • Cancer Brother    • Colon polyps Brother    • Colon cancer Neg Hx        Social History     Socioeconomic  "History   • Marital status:      Spouse name: Not on file   • Number of children: Not on file   • Years of education: Not on file   • Highest education level: Not on file   Tobacco Use   • Smoking status: Never Smoker   • Smokeless tobacco: Never Used   Substance and Sexual Activity   • Alcohol use: Yes     Comment: rarely   • Drug use: No   • Sexual activity: Defer       Allergies   Allergen Reactions   • Prochlorperazine        Current Outpatient Medications:   •  aspirin  MG tablet, Take 325 mg by mouth Daily., Disp: , Rfl:   •  loperamide (IMODIUM) 2 MG capsule, Take 2 mg by mouth As Needed for diarrhea., Disp: , Rfl:       Review of Systems   Constitutional: Negative.    HENT: Negative.    Eyes: Negative.    Respiratory: Negative.    Cardiovascular: Negative.    Gastrointestinal: Negative.    Endocrine: Negative.    Genitourinary: Negative.    Musculoskeletal: Negative.    Skin: Negative.    Allergic/Immunologic: Negative.    Neurological: Negative.    Hematological: Negative.    Psychiatric/Behavioral: Negative.        /78   Pulse 64   Ht 165.1 cm (65\")   Wt 73.9 kg (163 lb)   SpO2 99%   BMI 27.12 kg/m²   Physical Exam   Constitutional: She is oriented to person, place, and time. She appears well-developed and well-nourished. No distress.   HENT:   Head: Normocephalic and atraumatic.   Mouth/Throat: Oropharynx is clear and moist.   Eyes: Pupils are equal, round, and reactive to light. No scleral icterus.   Neck: Normal range of motion. Neck supple. No JVD present. Carotid bruit is not present. No thyromegaly present.   Cardiovascular: Normal rate, regular rhythm, S2 normal, normal heart sounds, intact distal pulses and normal pulses. Exam reveals no gallop and no friction rub.   No murmur heard.  Pulmonary/Chest: Effort normal and breath sounds normal.   Abdominal: Soft. Normal aorta and bowel sounds are normal. There is no hepatosplenomegaly.   Musculoskeletal: Normal range of motion. "   Neurological: She is alert and oriented to person, place, and time. No cranial nerve deficit.   Skin: Skin is warm and dry. She is not diaphoretic.   Psychiatric: She has a normal mood and affect. Her behavior is normal. Judgment and thought content normal.   Nursing note and vitals reviewed.      Us Carotid Bilateral    Result Date: 3/12/2019  Narrative: History: Carotid occlusive disease      Impression: Impression: 1. There is 50-69% stenosis of the right internal carotid artery. 2. There is 50-69% stenosis of the left internal carotid artery. 3. Antegrade flow is demonstrated in bilateral vertebral arteries.  Comments: Bilateral carotid vertebral arterial duplex scan was performed.  Grayscale imaging shows intimal thickening and calcified elements at the carotid bifurcation. The right internal carotid artery peak systolic velocity is 131 cm/sec. The end-diastolic velocity is 50.3 cm/sec. The right ICA/CCA ratio is approximately 1.4 . These findings correlate with 50-69% stenosis of the right internal carotid artery.  Grayscale imaging shows intimal thickening and calcified elements at the carotid bifurcation. The left internal carotid artery peak systolic velocity is 140 cm/sec. The end-diastolic velocity is 49.1 cm/sec. The left ICA/CCA ratio is approximately 1.46 . These findings correlate with 50-69% stenosis of the left internal carotid artery.  Antegrade flow is demonstrated in bilateral vertebral arteries.    This report was finalized on 03/12/2019 14:50 by Dr. Noah Treviño MD.      Patient Active Problem List   Diagnosis   • Transient cerebral ischemia   • LAN on CPAP   • Dyslipidemia   • Essential hypertension         ICD-10-CM ICD-9-CM   1. Bilateral carotid artery stenosis I65.23 433.10     433.30   2. Essential hypertension I10 401.9   3. Dyslipidemia E78.5 272.4       Plan: After thoroughly evaluating Edie Oconnor, I believe the best course of action is to remain conservative from a  vascular standpoint.  I did review her testing and she remains in the 50-69% range bilaterally.  She is asymptomatic from a strokelike standpoint.  We will continue to follow her on an annual basis with repeat noninvasive testing, including a carotid duplex.  The patient can continue taking their current medication regimen as previously planned.  This was all discussed in full with complete understanding.    Thank you for allowing me to participate in the care of your patient.  Please do not hesitate with any questions or concerns.  I will keep you aware of any further encounters with Edie Oconnor.        Sincerely yours,         JONEL Mclean

## 2019-04-11 ENCOUNTER — OFFICE VISIT (OUTPATIENT)
Dept: GASTROENTEROLOGY | Facility: CLINIC | Age: 64
End: 2019-04-11

## 2019-04-11 VITALS
DIASTOLIC BLOOD PRESSURE: 78 MMHG | HEIGHT: 65 IN | BODY MASS INDEX: 27.16 KG/M2 | WEIGHT: 163 LBS | HEART RATE: 78 BPM | OXYGEN SATURATION: 94 % | SYSTOLIC BLOOD PRESSURE: 122 MMHG

## 2019-04-11 DIAGNOSIS — E66.9 OBESITY, UNSPECIFIED OBESITY SEVERITY, UNSPECIFIED OBESITY TYPE: ICD-10-CM

## 2019-04-11 DIAGNOSIS — K52.9 COLITIS: ICD-10-CM

## 2019-04-11 DIAGNOSIS — R93.5 ABNORMAL CT OF THE ABDOMEN: Primary | ICD-10-CM

## 2019-04-11 DIAGNOSIS — Z78.9 NONSMOKER: ICD-10-CM

## 2019-04-11 PROCEDURE — 99214 OFFICE O/P EST MOD 30 MIN: CPT | Performed by: CLINICAL NURSE SPECIALIST

## 2019-04-11 RX ORDER — SODIUM, POTASSIUM,MAG SULFATES 17.5-3.13G
SOLUTION, RECONSTITUTED, ORAL ORAL
Qty: 2 BOTTLE | Refills: 0 | Status: ON HOLD | OUTPATIENT
Start: 2019-04-11 | End: 2019-05-07

## 2019-04-11 NOTE — PROGRESS NOTES
Edie Oconnor  1955 4/11/2019  Chief Complaint   Patient presents with   • GI Problem     Abdominal pain and abnormal CT scan     Subjective   HPI  Edie Oconnor is a 64 y.o. female who presents with a complaint of abdominal pain that lasted approx 1 week with fever and chills the first of March and persisted for a few weeks. It was more severe at first then it improved somewhat after antibiotics for 10 days. The pain does radiate around to her back. Rated a 4 out of 10. She did have some diarrhea this past Monday that lasted a few hours but this has now resolved.   She has had some fluctuation in her blood pressure. She does take daily aspirin. No other medications.  She had a CT  Scan 3/29/2019 findings showed pancolonic chronic inflammatory disease. No upper GI complaints. No wt loss. No fever at this time. Her last colonoscopy was in 2017 reviewed today.   Past Medical History:   Diagnosis Date   • Depression    • Diverticulitis    • H/O Bell's palsy     Right   • Hiatal hernia    • Hx of colonic polyp    • Neuromuscular disorder (CMS/HCC)     Neuropathy   • PONV (postoperative nausea and vomiting)    • Sleep apnea    • TIA (transient ischemic attack)      Past Surgical History:   Procedure Laterality Date   • APPENDECTOMY     • CARDIAC CATHETERIZATION     • CHOLECYSTECTOMY     • COLONOSCOPY  12/10/2012    diverticula of sigmoid, tubular adenoma   • COLONOSCOPY N/A 2/24/2017    Tubular adenoma ascending colon, Diverticulosis repeat exam in 5 years   • ELBOW OPEN REDUCTION INTERNAL FIXATION Left    • ENDOSCOPY  08/08/2016    HH, [place] Grade B reflux esophagitis   • HYSTERECTOMY     • UPPER GASTROINTESTINAL ENDOSCOPY  08/08/2016    hiatla hernia, [place] Grade B reflux esophagitis       Outpatient Medications Marked as Taking for the 4/11/19 encounter (Office Visit) with Collette Tariq APRN   Medication Sig Dispense Refill   • aspirin  MG tablet Take 325 mg by mouth Daily.     • loperamide  (IMODIUM) 2 MG capsule Take 2 mg by mouth As Needed for diarrhea.       Allergies   Allergen Reactions   • Prochlorperazine      Social History     Socioeconomic History   • Marital status:      Spouse name: Not on file   • Number of children: Not on file   • Years of education: Not on file   • Highest education level: Not on file   Tobacco Use   • Smoking status: Never Smoker   • Smokeless tobacco: Never Used   Substance and Sexual Activity   • Alcohol use: Yes     Comment: rarely   • Drug use: No   • Sexual activity: Defer     Family History   Problem Relation Age of Onset   • Diabetes Mother    • Hypertension Mother    • Cancer Father    • Heart disease Sister    • Cancer Brother    • Colon polyps Brother    • Colon cancer Neg Hx      Health Maintenance   Topic Date Due   • TDAP/TD VACCINES (1 - Tdap) 02/27/1973   • ZOSTER VACCINE (1 of 2) 02/27/2004   • HEPATITIS C SCREENING  05/19/2017   • MEDICARE ANNUAL WELLNESS  05/19/2017   • MAMMOGRAM  05/19/2017   • PNEUMOCOCCAL VACCINES (65+ LOW/MEDIUM RISK) (1 of 2 - PCV13) 02/27/2019   • INFLUENZA VACCINE  08/01/2019   • PAP SMEAR  10/17/2019   • COLONOSCOPY  02/24/2027     Review of Systems   Constitutional: Negative for activity change, appetite change, chills, diaphoresis, fatigue, fever and unexpected weight change.   HENT: Negative for ear pain, hearing loss, mouth sores, sore throat, trouble swallowing and voice change.    Eyes: Negative.    Respiratory: Negative for cough, choking, shortness of breath and wheezing.    Cardiovascular: Negative for chest pain and palpitations.   Gastrointestinal: Positive for abdominal pain and diarrhea. Negative for blood in stool, constipation, nausea and vomiting.   Endocrine: Negative for cold intolerance and heat intolerance.   Genitourinary: Negative for decreased urine volume, dysuria, frequency, hematuria and urgency.   Musculoskeletal: Negative for back pain, gait problem and myalgias.   Skin: Negative for color  "change, pallor and rash.   Allergic/Immunologic: Negative for food allergies and immunocompromised state.   Neurological: Negative for dizziness, tremors, seizures, syncope, weakness, light-headedness, numbness and headaches.   Hematological: Negative for adenopathy. Does not bruise/bleed easily.   Psychiatric/Behavioral: Negative for agitation and confusion. The patient is not nervous/anxious.    All other systems reviewed and are negative.    Objective   Vitals:    04/11/19 1309   BP: 122/78   Pulse: 78   SpO2: 94%   Weight: 73.9 kg (163 lb)   Height: 165.1 cm (65\")     Body mass index is 27.12 kg/m².  Physical Exam   Constitutional: She is oriented to person, place, and time. She appears well-developed and well-nourished.   HENT:   Head: Normocephalic and atraumatic.   Eyes: Pupils are equal, round, and reactive to light.   Neck: Normal range of motion. Neck supple. No tracheal deviation present.   Cardiovascular: Normal rate, regular rhythm and normal heart sounds. Exam reveals no gallop and no friction rub.   No murmur heard.  Pulmonary/Chest: Effort normal and breath sounds normal. No respiratory distress. She has no wheezes. She has no rales. She exhibits no tenderness.   Abdominal: Soft. Bowel sounds are normal. She exhibits no distension. There is no hepatosplenomegaly. There is no tenderness. There is no rigidity, no rebound and no guarding.   Musculoskeletal: Normal range of motion. She exhibits no edema, tenderness or deformity.   Neurological: She is alert and oriented to person, place, and time. She has normal reflexes.   Skin: Skin is warm and dry. No rash noted. No pallor.   Psychiatric: She has a normal mood and affect. Her behavior is normal. Judgment and thought content normal.     Assessment/Plan   Edie was seen today for gi problem.    Diagnoses and all orders for this visit:    Abnormal CT of the abdomen  -     Case Request; Standing  -     Follow Anesthesia Guidelines / Standing Orders; " Future  -     Implement Anesthesia Orders Day of Procedure; Standing  -     Obtain Informed Consent; Standing  -     Verify bowel prep was successful; Standing  -     Case Request  -     SUPREP BOWEL PREP KIT 17.5-3.13-1.6 GM/177ML solution oral solution; Take as directed by office instructions provided    Colitis    Nonsmoker    Obesity, unspecified obesity severity, unspecified obesity type      Will get labs from Dr Xiong    COLONOSCOPY WITH ANESTHESIA (N/A)  EMR Dragon/transcription disclaimer: Much of this encounter note is electronic transcription/translation of spoken language to printed text. The electronic translation of spoken language may be erroneous, or at times, nonsensical words or phrases may be inadvertently transcribed. Although I have reviewed the note for such errors, some may still exist.  Body mass index is 27.12 kg/m².  No Follow-up on file.    Patient's Body mass index is 27.12 kg/m². BMI is above normal parameters. Recommendations include: nutrition counseling.      All risks, benefits, alternatives, and indications of colonoscopy and/or Endoscopy procedure have been discussed with the patient. Risks to include perforation of the colon requiring possible surgery or colostomy, risk of bleeding from biopsies or removal of colon tissue, possibility of missing a colon polyp or cancer, or adverse drug reaction.  Benefits to include the diagnosis and management of disease of the colon and rectum. Alternatives to include barium enema, radiographic evaluation, lab testing or no intervention. Pt verbalizes understanding and agrees.     Collette Tariq, APRN  4/11/2019  1:58 PM      Obesity, Adult  Obesity is the condition of having too much total body fat. Being overweight or obese means that your weight is greater than what is considered healthy for your body size. Obesity is determined by a measurement called BMI. BMI is an estimate of body fat and is calculated from height and weight. For  adults, a BMI of 30 or higher is considered obese.  Obesity can eventually lead to other health concerns and major illnesses, including:  · Stroke.  · Coronary artery disease (CAD).  · Type 2 diabetes.  · Some types of cancer, including cancers of the colon, breast, uterus, and gallbladder.  · Osteoarthritis.  · High blood pressure (hypertension).  · High cholesterol.  · Sleep apnea.  · Gallbladder stones.  · Infertility problems.  What are the causes?  The main cause of obesity is taking in (consuming) more calories than your body uses for energy. Other factors that contribute to this condition may include:  · Being born with genes that make you more likely to become obese.  · Having a medical condition that causes obesity. These conditions include:  ¨ Hypothyroidism.  ¨ Polycystic ovarian syndrome (PCOS).  ¨ Binge-eating disorder.  ¨ Cushing syndrome.  · Taking certain medicines, such as steroids, antidepressants, and seizure medicines.  · Not being physically active (sedentary lifestyle).  · Living where there are limited places to exercise safely or buy healthy foods.  · Not getting enough sleep.  What increases the risk?  The following factors may increase your risk of this condition:  · Having a family history of obesity.  · Being a woman of -American descent.  · Being a man of  descent.  What are the signs or symptoms?  Having excessive body fat is the main symptom of this condition.  How is this diagnosed?  This condition may be diagnosed based on:  · Your symptoms.  · Your medical history.  · A physical exam. Your health care provider may measure:  ¨ Your BMI. If you are an adult with a BMI between 25 and less than 30, you are considered overweight. If you are an adult with a BMI of 30 or higher, you are considered obese.  ¨ The distances around your hips and your waist (circumferences). These may be compared to each other to help diagnose your condition.  ¨ Your skinfold thickness. Your  health care provider may gently pinch a fold of your skin and measure it.  How is this treated?  Treatment for this condition often includes changing your lifestyle. Treatment may include some or all of the following:  · Dietary changes. Work with your health care provider and a dietitian to set a weight-loss goal that is healthy and reasonable for you. Dietary changes may include eating:  ¨ Smaller portions. A portion size is the amount of a particular food that is healthy for you to eat at one time. This varies from person to person.  ¨ Low-calorie or low-fat options.  ¨ More whole grains, fruits, and vegetables.  · Regular physical activity. This may include aerobic activity (cardio) and strength training.  · Medicine to help you lose weight. Your health care provider may prescribe medicine if you are unable to lose 1 pound a week after 6 weeks of eating more healthily and doing more physical activity.  · Surgery. Surgical options may include gastric banding and gastric bypass. Surgery may be done if:  ¨ Other treatments have not helped to improve your condition.  ¨ You have a BMI of 40 or higher.  ¨ You have life-threatening health problems related to obesity.  Follow these instructions at home:     Eating and drinking     · Follow recommendations from your health care provider about what you eat and drink. Your health care provider may advise you to:  ¨ Limit fast foods, sweets, and processed snack foods.  ¨ Choose low-fat options, such as low-fat milk instead of whole milk.  ¨ Eat 5 or more servings of fruits or vegetables every day.  ¨ Eat at home more often. This gives you more control over what you eat.  ¨ Choose healthy foods when you eat out.  ¨ Learn what a healthy portion size is.  ¨ Keep low-fat snacks on hand.  ¨ Avoid sugary drinks, such as soda, fruit juice, iced tea sweetened with sugar, and flavored milk.  ¨ Eat a healthy breakfast.  · Drink enough water to keep your urine clear or pale  yellow.  · Do not go without eating for long periods of time (do not fast) or follow a fad diet. Fasting and fad diets can be unhealthy and even dangerous.  Physical Activity   · Exercise regularly, as told by your health care provider. Ask your health care provider what types of exercise are safe for you and how often you should exercise.  · Warm up and stretch before being active.  · Cool down and stretch after being active.  · Rest between periods of activity.  Lifestyle   · Limit the time that you spend in front of your TV, computer, or video game system.  · Find ways to reward yourself that do not involve food.  · Limit alcohol intake to no more than 1 drink a day for nonpregnant women and 2 drinks a day for men. One drink equals 12 oz of beer, 5 oz of wine, or 1½ oz of hard liquor.  General instructions   · Keep a weight loss journal to keep track of the food you eat and how much you exercise you get.  · Take over-the-counter and prescription medicines only as told by your health care provider.  · Take vitamins and supplements only as told by your health care provider.  · Consider joining a support group. Your health care provider may be able to recommend a support group.  · Keep all follow-up visits as told by your health care provider. This is important.  Contact a health care provider if:  · You are unable to meet your weight loss goal after 6 weeks of dietary and lifestyle changes.  This information is not intended to replace advice given to you by your health care provider. Make sure you discuss any questions you have with your health care provider.  Document Released: 01/25/2006 Document Revised: 05/22/2017 Document Reviewed: 10/05/2016  [employer] Interactive Patient Education © 2017 [employer]      If you smoke or use tobacco, 4 minutes reading provided  Steps to Quit Smoking  Smoking tobacco can be harmful to your health and can affect almost every organ in your body. Smoking puts you, and those  around you, at risk for developing many serious chronic diseases. Quitting smoking is difficult, but it is one of the best things that you can do for your health. It is never too late to quit.  What are the benefits of quitting smoking?  When you quit smoking, you lower your risk of developing serious diseases and conditions, such as:  · Lung cancer or lung disease, such as COPD.  · Heart disease.  · Stroke.  · Heart attack.  · Infertility.  · Osteoporosis and bone fractures.  Additionally, symptoms such as coughing, wheezing, and shortness of breath may get better when you quit. You may also find that you get sick less often because your body is stronger at fighting off colds and infections. If you are pregnant, quitting smoking can help to reduce your chances of having a baby of low birth weight.  How do I get ready to quit?  When you decide to quit smoking, create a plan to make sure that you are successful. Before you quit:  · Pick a date to quit. Set a date within the next two weeks to give you time to prepare.  · Write down the reasons why you are quitting. Keep this list in places where you will see it often, such as on your bathroom mirror or in your car or wallet.  · Identify the people, places, things, and activities that make you want to smoke (triggers) and avoid them. Make sure to take these actions:  ¨ Throw away all cigarettes at home, at work, and in your car.  ¨ Throw away smoking accessories, such as ashtrays and lighters.  ¨ Clean your car and make sure to empty the ashtray.  ¨ Clean your home, including curtains and carpets.  · Tell your family, friends, and coworkers that you are quitting. Support from your loved ones can make quitting easier.  · Talk with your health care provider about your options for quitting smoking.  · Find out what treatment options are covered by your health insurance.  What strategies can I use to quit smoking?  Talk with your healthcare provider about different  strategies to quit smoking. Some strategies include:  · Quitting smoking altogether instead of gradually lessening how much you smoke over a period of time. Research shows that quitting “cold turkey” is more successful than gradually quitting.  · Attending in-person counseling to help you build problem-solving skills. You are more likely to have success in quitting if you attend several counseling sessions. Even short sessions of 10 minutes can be effective.  · Finding resources and support systems that can help you to quit smoking and remain smoke-free after you quit. These resources are most helpful when you use them often. They can include:  ¨ Online chats with a counselor.  ¨ Telephone quitlines.  ¨ Printed self-help materials.  ¨ Support groups or group counseling.  ¨ Text messaging programs.  ¨ Mobile phone applications.  · Taking medicines to help you quit smoking. (If you are pregnant or breastfeeding, talk with your health care provider first.) Some medicines contain nicotine and some do not. Both types of medicines help with cravings, but the medicines that include nicotine help to relieve withdrawal symptoms. Your health care provider may recommend:  ¨ Nicotine patches, gum, or lozenges.  ¨ Nicotine inhalers or sprays.  ¨ Non-nicotine medicine that is taken by mouth.  Talk with your health care provider about combining strategies, such as taking medicines while you are also receiving in-person counseling. Using these two strategies together makes you more likely to succeed in quitting than if you used either strategy on its own.  If you are pregnant or breastfeeding, talk with your health care provider about finding counseling or other support strategies to quit smoking. Do not take medicine to help you quit smoking unless told to do so by your health care provider.  What things can I do to make it easier to quit?  Quitting smoking might feel overwhelming at first, but there is a lot that you can do to  make it easier. Take these important actions:  · Reach out to your family and friends and ask that they support and encourage you during this time. Call telephone quitlines, reach out to support groups, or work with a counselor for support.  · Ask people who smoke to avoid smoking around you.  · Avoid places that trigger you to smoke, such as bars, parties, or smoke-break areas at work.  · Spend time around people who do not smoke.  · Lessen stress in your life, because stress can be a smoking trigger for some people. To lessen stress, try:  ¨ Exercising regularly.  ¨ Deep-breathing exercises.  ¨ Yoga.  ¨ Meditating.  ¨ Performing a body scan. This involves closing your eyes, scanning your body from head to toe, and noticing which parts of your body are particularly tense. Purposefully relax the muscles in those areas.  · Download or purchase mobile phone or tablet apps (applications) that can help you stick to your quit plan by providing reminders, tips, and encouragement. There are many free apps, such as QuitGuide from the CDC (Centers for Disease Control and Prevention). You can find other support for quitting smoking (smoking cessation) through smokefree.gov and other websites.  How will I feel when I quit smoking?  Within the first 24 hours of quitting smoking, you may start to feel some withdrawal symptoms. These symptoms are usually most noticeable 2-3 days after quitting, but they usually do not last beyond 2-3 weeks. Changes or symptoms that you might experience include:  · Mood swings.  · Restlessness, anxiety, or irritation.  · Difficulty concentrating.  · Dizziness.  · Strong cravings for sugary foods in addition to nicotine.  · Mild weight gain.  · Constipation.  · Nausea.  · Coughing or a sore throat.  · Changes in how your medicines work in your body.  · A depressed mood.  · Difficulty sleeping (insomnia).  After the first 2-3 weeks of quitting, you may start to notice more positive results, such  as:  · Improved sense of smell and taste.  · Decreased coughing and sore throat.  · Slower heart rate.  · Lower blood pressure.  · Clearer skin.  · The ability to breathe more easily.  · Fewer sick days.  Quitting smoking is very challenging for most people. Do not get discouraged if you are not successful the first time. Some people need to make many attempts to quit before they achieve long-term success. Do your best to stick to your quit plan, and talk with your health care provider if you have any questions or concerns.  This information is not intended to replace advice given to you by your health care provider. Make sure you discuss any questions you have with your health care provider.  Document Released: 12/12/2002 Document Revised: 08/15/2017 Document Reviewed: 05/03/2016  [employer] Interactive Patient Education © 2017 [employer]

## 2019-05-07 ENCOUNTER — ANESTHESIA EVENT (OUTPATIENT)
Dept: GASTROENTEROLOGY | Facility: HOSPITAL | Age: 64
End: 2019-05-07

## 2019-05-07 ENCOUNTER — ANESTHESIA (OUTPATIENT)
Dept: GASTROENTEROLOGY | Facility: HOSPITAL | Age: 64
End: 2019-05-07

## 2019-05-07 ENCOUNTER — HOSPITAL ENCOUNTER (OUTPATIENT)
Facility: HOSPITAL | Age: 64
Setting detail: HOSPITAL OUTPATIENT SURGERY
Discharge: HOME | End: 2019-05-07
Attending: INTERNAL MEDICINE | Admitting: INTERNAL MEDICINE

## 2019-05-07 VITALS
RESPIRATION RATE: 19 BRPM | TEMPERATURE: 98.1 F | HEART RATE: 76 BPM | WEIGHT: 158 LBS | BODY MASS INDEX: 26.33 KG/M2 | OXYGEN SATURATION: 93 % | SYSTOLIC BLOOD PRESSURE: 105 MMHG | HEIGHT: 65 IN | DIASTOLIC BLOOD PRESSURE: 61 MMHG

## 2019-05-07 DIAGNOSIS — R93.5 ABNORMAL CT OF THE ABDOMEN: ICD-10-CM

## 2019-05-07 PROCEDURE — 88305 TISSUE EXAM BY PATHOLOGIST: CPT | Performed by: INTERNAL MEDICINE

## 2019-05-07 PROCEDURE — 25010000002 PROPOFOL 10 MG/ML EMULSION: Performed by: NURSE ANESTHETIST, CERTIFIED REGISTERED

## 2019-05-07 PROCEDURE — 45380 COLONOSCOPY AND BIOPSY: CPT | Performed by: INTERNAL MEDICINE

## 2019-05-07 RX ORDER — SODIUM CHLORIDE 9 MG/ML
500 INJECTION, SOLUTION INTRAVENOUS CONTINUOUS PRN
Status: DISCONTINUED | OUTPATIENT
Start: 2019-05-07 | End: 2019-05-07 | Stop reason: HOSPADM

## 2019-05-07 RX ORDER — LIDOCAINE HYDROCHLORIDE 20 MG/ML
INJECTION, SOLUTION INFILTRATION; PERINEURAL AS NEEDED
Status: DISCONTINUED | OUTPATIENT
Start: 2019-05-07 | End: 2019-05-07 | Stop reason: SURG

## 2019-05-07 RX ORDER — SODIUM CHLORIDE 0.9 % (FLUSH) 0.9 %
3 SYRINGE (ML) INJECTION AS NEEDED
Status: DISCONTINUED | OUTPATIENT
Start: 2019-05-07 | End: 2019-05-07 | Stop reason: HOSPADM

## 2019-05-07 RX ORDER — VITAMIN E 268 MG
400 CAPSULE ORAL DAILY
COMMUNITY
End: 2021-03-04

## 2019-05-07 RX ORDER — PROPOFOL 10 MG/ML
VIAL (ML) INTRAVENOUS AS NEEDED
Status: DISCONTINUED | OUTPATIENT
Start: 2019-05-07 | End: 2019-05-07 | Stop reason: SURG

## 2019-05-07 RX ORDER — MULTIPLE VITAMINS W/ MINERALS TAB 9MG-400MCG
1 TAB ORAL DAILY
COMMUNITY

## 2019-05-07 RX ADMIN — PROPOFOL 200 MG: 10 INJECTION, EMULSION INTRAVENOUS at 11:46

## 2019-05-07 RX ADMIN — PROPOFOL 50 MG: 10 INJECTION, EMULSION INTRAVENOUS at 11:57

## 2019-05-07 RX ADMIN — SODIUM CHLORIDE 500 ML: 9 INJECTION, SOLUTION INTRAVENOUS at 11:12

## 2019-05-07 RX ADMIN — LIDOCAINE HYDROCHLORIDE 100 MG: 20 INJECTION, SOLUTION INFILTRATION; PERINEURAL at 11:46

## 2019-05-07 RX ADMIN — PROPOFOL 100 MG: 10 INJECTION, EMULSION INTRAVENOUS at 11:53

## 2019-05-07 NOTE — ANESTHESIA PREPROCEDURE EVALUATION
Anesthesia Evaluation     Patient summary reviewed   no history of anesthetic complications:  NPO Solid Status: > 8 hours             Airway   Mallampati: II  TM distance: >3 FB  Neck ROM: full  Dental      Pulmonary    (+) sleep apnea on CPAP,   Cardiovascular     (+)  carotid artery disease      Neuro/Psych  (+) TIA,     GI/Hepatic/Renal/Endo    (+)   liver disease fatty liver disease,     Musculoskeletal     Abdominal    Substance History      OB/GYN          Other                        Anesthesia Plan    ASA 2     MAC     Anesthetic plan, all risks, benefits, and alternatives have been provided, discussed and informed consent has been obtained with: patient.

## 2019-05-07 NOTE — ANESTHESIA POSTPROCEDURE EVALUATION
Patient: Edie Oconnor    Procedure Summary     Date:  05/07/19 Room / Location:  [facility] ENDOSCOPY 6 / [facility]    Anesthesia Start:  1142 Anesthesia Stop:  1200    Procedure:  COLONOSCOPY WITH ANESTHESIA (N/A ) Diagnosis:       Abnormal CT of the abdomen      (Abnormal CT of the abdomen [R93.5])    Surgeon:  Dom Parker MD Provider:  Melchor Thornton CRNA    Anesthesia Type:  MAC ASA Status:  2          Anesthesia Type: MAC  Last vitals  BP   105/61 (05/07/19 1225)   Temp   98.1 °F (36.7 °C) (05/07/19 1050)   Pulse   76 (05/07/19 1225)   Resp   19 (05/07/19 1210)     SpO2   93 % (05/07/19 1225)     Post Anesthesia Care and Evaluation    Patient location during evaluation: PHASE II  Level of consciousness: awake and alert  Pain management: adequate  Airway patency: patent  Anesthetic complications: No anesthetic complications    Cardiovascular status: acceptable  Respiratory status: acceptable  Hydration status: acceptable

## 2019-05-08 LAB
CYTO UR: NORMAL
LAB AP CASE REPORT: NORMAL
PATH REPORT.FINAL DX SPEC: NORMAL
PATH REPORT.GROSS SPEC: NORMAL

## 2019-05-13 ENCOUNTER — TELEPHONE (OUTPATIENT)
Dept: GASTROENTEROLOGY | Facility: CLINIC | Age: 64
End: 2019-05-13

## 2019-06-11 ENCOUNTER — TRANSCRIBE ORDERS (OUTPATIENT)
Dept: ADMINISTRATIVE | Facility: HOSPITAL | Age: 64
End: 2019-06-11

## 2019-06-11 ENCOUNTER — HOSPITAL ENCOUNTER (OUTPATIENT)
Dept: ULTRASOUND IMAGING | Facility: HOSPITAL | Age: 64
Discharge: HOME | End: 2019-06-11
Admitting: PHYSICIAN ASSISTANT

## 2019-06-11 ENCOUNTER — HOSPITAL ENCOUNTER (OUTPATIENT)
Dept: GENERAL RADIOLOGY | Facility: HOSPITAL | Age: 64
Discharge: HOME | End: 2019-06-11

## 2019-06-11 DIAGNOSIS — R07.89 OTHER CHEST PAIN: ICD-10-CM

## 2019-06-11 DIAGNOSIS — R07.89 OTHER CHEST PAIN: Primary | ICD-10-CM

## 2019-06-11 DIAGNOSIS — R06.02 SHORTNESS OF BREATH: ICD-10-CM

## 2019-06-11 PROCEDURE — 71046 X-RAY EXAM CHEST 2 VIEWS: CPT

## 2019-06-11 PROCEDURE — 76604 US EXAM CHEST: CPT

## 2019-06-12 ENCOUNTER — APPOINTMENT (OUTPATIENT)
Dept: GENERAL RADIOLOGY | Facility: HOSPITAL | Age: 64
End: 2019-06-12

## 2019-06-12 ENCOUNTER — HOSPITAL ENCOUNTER (EMERGENCY)
Facility: HOSPITAL | Age: 64
Discharge: HOME | End: 2019-06-12
Admitting: EMERGENCY MEDICINE

## 2019-06-12 VITALS
BODY MASS INDEX: 26.76 KG/M2 | OXYGEN SATURATION: 97 % | RESPIRATION RATE: 16 BRPM | TEMPERATURE: 97.6 F | HEIGHT: 65 IN | DIASTOLIC BLOOD PRESSURE: 61 MMHG | WEIGHT: 160.6 LBS | HEART RATE: 79 BPM | SYSTOLIC BLOOD PRESSURE: 121 MMHG

## 2019-06-12 DIAGNOSIS — M79.601 PAIN OF RIGHT UPPER EXTREMITY: Primary | ICD-10-CM

## 2019-06-12 DIAGNOSIS — S46.211A RUPTURE OF RIGHT DISTAL BICEPS TENDON, INITIAL ENCOUNTER: ICD-10-CM

## 2019-06-12 PROCEDURE — 73030 X-RAY EXAM OF SHOULDER: CPT

## 2019-06-12 PROCEDURE — 99283 EMERGENCY DEPT VISIT LOW MDM: CPT

## 2019-06-12 PROCEDURE — 73080 X-RAY EXAM OF ELBOW: CPT

## 2019-06-13 ENCOUNTER — TELEPHONE (OUTPATIENT)
Dept: NEUROLOGY | Age: 64
End: 2019-06-13

## 2019-06-13 ENCOUNTER — TRANSCRIBE ORDERS (OUTPATIENT)
Dept: GENERAL RADIOLOGY | Facility: HOSPITAL | Age: 64
End: 2019-06-13

## 2019-06-13 NOTE — TELEPHONE ENCOUNTER
Called and spoke with patient to let her know that her appointment for 08-08-19 with Issac Reyes had to be rescheduled due to the provider will be out of the office. Patient is aware of when I have her appointment rescheduled too.

## 2019-06-14 ENCOUNTER — HOSPITAL ENCOUNTER (OUTPATIENT)
Dept: ULTRASOUND IMAGING | Age: 64
Discharge: HOME | End: 2019-06-14
Payer: MEDICARE

## 2019-08-15 ENCOUNTER — TELEPHONE (OUTPATIENT)
Dept: NEUROLOGY | Age: 64
End: 2019-08-15

## 2019-08-16 ENCOUNTER — OFFICE VISIT (OUTPATIENT)
Dept: NEUROLOGY | Age: 64
End: 2019-08-16
Payer: MEDICARE

## 2019-08-16 VITALS
HEART RATE: 68 BPM | HEIGHT: 65 IN | OXYGEN SATURATION: 98 % | SYSTOLIC BLOOD PRESSURE: 116 MMHG | DIASTOLIC BLOOD PRESSURE: 72 MMHG | WEIGHT: 161 LBS | BODY MASS INDEX: 26.82 KG/M2

## 2019-08-16 DIAGNOSIS — Z99.89 CPAP (CONTINUOUS POSITIVE AIRWAY PRESSURE) DEPENDENCE: ICD-10-CM

## 2019-08-16 DIAGNOSIS — G47.33 OBSTRUCTIVE SLEEP APNEA: Primary | ICD-10-CM

## 2019-08-16 PROCEDURE — 4040F PNEUMOC VAC/ADMIN/RCVD: CPT | Performed by: PHYSICIAN ASSISTANT

## 2019-08-16 PROCEDURE — 3017F COLORECTAL CA SCREEN DOC REV: CPT | Performed by: PHYSICIAN ASSISTANT

## 2019-08-16 PROCEDURE — 99213 OFFICE O/P EST LOW 20 MIN: CPT | Performed by: PHYSICIAN ASSISTANT

## 2019-08-16 PROCEDURE — 4004F PT TOBACCO SCREEN RCVD TLK: CPT | Performed by: PHYSICIAN ASSISTANT

## 2019-08-16 PROCEDURE — 1090F PRES/ABSN URINE INCON ASSESS: CPT | Performed by: PHYSICIAN ASSISTANT

## 2019-08-16 PROCEDURE — G8598 ASA/ANTIPLAT THER USED: HCPCS | Performed by: PHYSICIAN ASSISTANT

## 2019-08-16 PROCEDURE — G8400 PT W/DXA NO RESULTS DOC: HCPCS | Performed by: PHYSICIAN ASSISTANT

## 2019-08-16 PROCEDURE — 1123F ACP DISCUSS/DSCN MKR DOCD: CPT | Performed by: PHYSICIAN ASSISTANT

## 2019-08-16 PROCEDURE — G8419 CALC BMI OUT NRM PARAM NOF/U: HCPCS | Performed by: PHYSICIAN ASSISTANT

## 2019-08-16 PROCEDURE — G8427 DOCREV CUR MEDS BY ELIG CLIN: HCPCS | Performed by: PHYSICIAN ASSISTANT

## 2019-08-16 NOTE — PATIENT INSTRUCTIONS
older age adults. ?Male sex - MELVIN is two times more common in men, especially in middle age. ?Obesity - The more obese a person is, the more likely he or she is to have MELVIN. ? Sedation from medication or alcohol - This interferes with the ability to awaken from sleep and can lengthen periods of apnea (no breathing), with potentially dangerous consequences. ? Abnormality of the airway. SLEEP APNEA CONSEQUENCES -- Complications of sleep apnea can include daytime sleepiness and difficulty concentrating. The consequence of this is an increased risk of accidents and errors in daily activities. Studies have shown that people with severe MELVIN are more than twice as likely to be involved in a motor vehicle accident as people without these conditions. People with MELVIN are encouraged to discuss options for driving, working, and performing other high-risk tasks with a healthcare provider. In addition, people with untreated MELVIN may have an increased risk of cardiovascular problems such as high blood pressure, heart attack, abnormal heart rhythms, or stroke. This risk may be due to changes in the heart rate and blood pressure that occur during sleep. SLEEP APNEA DIAGNOSIS -- The diagnosis of MELVIN is best made by a knowledgeable sleep medicine specialist who has an understanding of the individual's health issues. The diagnosis is usually based upon the person's medical history, physical examination, and testing, including:  ? A complaint of snoring and ineffective sleep  ? Neck size (greater than 16 inches in men or 14 inches in women) is associated with an increased risk of sleep apnea  ? A small upper airway: difficulty seeing the throat because of a tongue that is large for the mouth  ? High blood pressure, especially if it is resistant to treatment  ? If a bed partner has observed the patient during episodes of stopped breathing (apnea), choking, or gasping during sleep, there is a strong possibility of sleep medications require special management and close monitoring to reduce the risk of a blocked airway. Dental devices -- A dental device, called an oral appliance or mandibular advancement device, can reposition the jaw (mandible), bringing the tongue and soft palate forward as well. This may relieve obstruction in some people. This treatment is excellent for reducing snoring, although the effect on MELVIN is sometimes more limited. As a result, dental devices are best used for mild cases of MELVIN when relief of snoring is the main goal. Failure to tolerate and accept CPAP is another indication for dental devices. While dental devices are not as effective as CPAP for MELVIN, some patients prefer a dental device to CPAP. Side effects of dental devices are generally minor but may include changes to the bite with prolonged use. Surgical treatment -- Surgery is an alternative therapy for patients who cannot tolerate or do not improve with nonsurgical treatments such as CPAP or oral devices. Surgery can also be used in combination with other nonsurgical treatments. Surgical procedures reshape structures in the upper airways or surgically reposition bone or soft tissue. Uvulopalatopharyngoplasty (UPPP) removes the uvula and excessive tissue in the throat, including the tonsils, if present. Other procedures, such as maxillomandibular advancement (MMA), address both the upper and lower pharyngeal airway more globally. UPPP alone has limited success rates (less than 50 percent) and people can relapse (when MELVIN symptoms return after surgery). As a result, this surgery is only recommended in a minority of people and should be considered with caution. MMA may have a higher success rate, particularly in people with abnormal jaw (maxilla and mandible) anatomy, but it is the most complicated procedure. A newer surgical approach, nerve stimulation to protrude the tongue, has promising success rates in very selected people.   Tracheostomy

## 2019-08-16 NOTE — PROGRESS NOTES
[facility] Sleep Follow Up Encounter      Information:   Patient Name: Blake Croft  :   1955  Age:   71 y.o. MRN:   [id number]  Account #:  [de-identified]  Today:                19    Provider:  Rhona Rothman PA-C    Chief Complaint   Patient presents with    Sleep Apnea     pt states she thinks everything is going ok        Subjective:   Blake Croft is a 71 y.o. female  with a history of severe MELVIN and RLS who comes in for an annual sleep clinic follow up. The HST, 2016 revealed an AHI of 35.3.  She is prescribed auto CPAP therapy at a pressure of 7cm to 15.8cm. The compliance report indicates that she is averaging 7 hours of CPAP use per day. She averages 6.5 hours of sleep per night. She reports that consistent CPAP use has alleviated the previous MELVIN symptoms. The RLS is stable. She has lost 20 lbs and no longer is prescribed antihypertensives.     Location or symptom:  MELVIN  Onset:  Repeat PS2016  Timing:  q hs  Severity:  Severe  Associated:  Snoring, witnessed apneas, and excessive daytime somnolence  Alleviated:  CPAP      Objective:     Past Medical History:   Diagnosis Date    Bell's palsy     Chronic back pain     Colon polyp     CPAP (continuous positive airway pressure) dependence     7cm to 15.8cm    Diverticulitis     GERD (gastroesophageal reflux disease)     H/O diverticulitis of colon     Hiatal hernia     Hyperlipidemia     Hypertension     Irritable bowel syndrome     Neuropathy     Obstructive sleep apnea     AHI:  35.3 per HST, 2016    Restless legs syndrome     Rheumatoid aortitis     Sleep apnea     c-pap    TIA (transient ischemic attack)        Past Surgical History:   Procedure Laterality Date    APPENDECTOMY      CHOLECYSTECTOMY      ELBOW SURGERY Left 3/26/15    HYSTERECTOMY      total    UPPER GASTROINTESTINAL ENDOSCOPY         Recent Hospitalizations  ·     Significant Injuries  ·     Family History   Problem Relation Age of Onset   

## 2020-03-03 ENCOUNTER — HOSPITAL ENCOUNTER (OUTPATIENT)
Dept: ULTRASOUND IMAGING | Facility: HOSPITAL | Age: 65
Discharge: HOME | End: 2020-03-03
Admitting: NURSE PRACTITIONER

## 2020-03-03 DIAGNOSIS — I65.23 BILATERAL CAROTID ARTERY STENOSIS: ICD-10-CM

## 2020-03-03 PROCEDURE — 93880 EXTRACRANIAL BILAT STUDY: CPT | Performed by: SURGERY

## 2020-03-03 PROCEDURE — 93880 EXTRACRANIAL BILAT STUDY: CPT

## 2020-03-04 ENCOUNTER — TELEPHONE (OUTPATIENT)
Dept: VASCULAR SURGERY | Facility: CLINIC | Age: 65
End: 2020-03-04

## 2020-03-05 ENCOUNTER — OFFICE VISIT (OUTPATIENT)
Dept: VASCULAR SURGERY | Facility: CLINIC | Age: 65
End: 2020-03-05

## 2020-03-05 VITALS
SYSTOLIC BLOOD PRESSURE: 132 MMHG | BODY MASS INDEX: 28.49 KG/M2 | WEIGHT: 171 LBS | DIASTOLIC BLOOD PRESSURE: 82 MMHG | HEIGHT: 65 IN | OXYGEN SATURATION: 98 % | HEART RATE: 78 BPM

## 2020-03-05 DIAGNOSIS — E78.5 DYSLIPIDEMIA: ICD-10-CM

## 2020-03-05 DIAGNOSIS — I10 ESSENTIAL HYPERTENSION: ICD-10-CM

## 2020-03-05 DIAGNOSIS — I65.23 BILATERAL CAROTID ARTERY STENOSIS: Primary | ICD-10-CM

## 2020-03-05 PROCEDURE — 99214 OFFICE O/P EST MOD 30 MIN: CPT | Performed by: SURGERY

## 2020-03-05 NOTE — PROGRESS NOTES
"3/5/2020       Collette Xiong,   [address]  [place] [address]    Edie Oconnor  1955    Chief Complaint   Patient presents with   • Follow-up     1 Year Follow Up For Bilateral Carotid Artery Stenosis. Test [id number] US pad carotid bilateral. Patient denies any stroke like symptoms.        Dear Collette Xiong,        HPI  I had the pleasure of seeing your patient Edie Oconnor in the office today.  As you recall, Edie Oconnor is a 65 y.o.  female who you are currently following for routine health maintenance.  She is being referred for carotid artery stenosis.  She did previously follow with Dr. Valadez, but has not been seen there since 2016.  She denies any strokelike symptoms.  She is maintained on aspirin.  She denies any claudication to her lower extremities.  She does have a history of TIA.  She reports cramping and aching at night.  She also thinks she may be having plantar fascitis symptoms to her left foot.  She did have noninvasive testing performed, which I did review in office.        Review of Systems   Constitutional: Negative.    HENT: Negative.    Eyes: Negative.    Respiratory: Negative.    Cardiovascular: Positive for leg swelling.   Gastrointestinal: Negative.    Endocrine: Negative.    Genitourinary: Negative.    Musculoskeletal: Negative.    Skin: Negative.    Allergic/Immunologic: Negative.    Neurological: Negative.    Hematological: Negative.    Psychiatric/Behavioral: Negative.        /82 (BP Location: Right arm, Patient Position: Sitting, Cuff Size: Adult)   Pulse 78   Ht 165.1 cm (65\")   Wt 77.6 kg (171 lb)   SpO2 98%   BMI 28.46 kg/m²   Physical Exam   Constitutional: She is oriented to person, place, and time. Vital signs are normal. She appears well-developed and well-nourished. No distress.   HENT:   Head: Normocephalic and atraumatic.   Mouth/Throat: Oropharynx is clear and moist.   Eyes: Pupils are equal, round, and " reactive to light. No scleral icterus.   Neck: Normal range of motion. Neck supple. No JVD present. Carotid bruit is not present. No thyromegaly present.   Cardiovascular: Normal rate, regular rhythm, S2 normal, normal heart sounds, intact distal pulses and normal pulses. Exam reveals no gallop and no friction rub.   No murmur heard.  Pulmonary/Chest: Effort normal and breath sounds normal.   Abdominal: Soft. Normal aorta and bowel sounds are normal. There is no hepatosplenomegaly.   Musculoskeletal: Normal range of motion.   Neurological: She is alert and oriented to person, place, and time. No cranial nerve deficit.   Skin: Skin is warm and dry. She is not diaphoretic.   Psychiatric: She has a normal mood and affect. Her behavior is normal. Judgment and thought content normal.   Nursing note and vitals reviewed.      Us Carotid Bilateral    Result Date: 3/3/2020  Narrative: History: Carotid occlusive disease      Impression: Impression: 1. There is less than 50% stenosis of the right internal carotid artery. 2. There is less than 50% stenosis of the left internal carotid artery. 3. Antegrade flow is demonstrated in bilateral vertebral arteries.  Comments: Bilateral carotid vertebral arterial duplex scan was performed.  Grayscale imaging shows intimal thickening and calcified elements at the carotid bifurcation. The right internal carotid artery peak systolic velocity is 107 cm/sec. The end-diastolic velocity is 37.7 cm/sec. The right ICA/CCA ratio is approximately 1.16 . These findings correlate with less than 50% stenosis of the right internal carotid artery.  Grayscale imaging shows intimal thickening and calcified elements at the carotid bifurcation. The left internal carotid artery peak systolic velocity is 112 cm/sec. The end-diastolic velocity is 37.7 cm/sec. The left ICA/CCA ratio is approximately 1.34 . These findings correlate with less than 50% stenosis of the left internal carotid artery.  Antegrade  flow is demonstrated in bilateral vertebral arteries.  This report was finalized on 03/03/2020 15:27 by Dr. Dwayne Skinner MD.      Patient Active Problem List   Diagnosis   • Transient cerebral ischemia   • LAN on CPAP   • Dyslipidemia   • Essential hypertension   • Abnormal CT of the abdomen   • Colitis   • Nonsmoker   • Obesity, unspecified obesity severity, unspecified obesity type         ICD-10-CM ICD-9-CM   1. Bilateral carotid artery stenosis I65.23 433.10     433.30   2. Essential hypertension I10 401.9   3. Dyslipidemia E78.5 272.4       Plan: After thoroughly evaluating Edie Oconnor, I believe the best course of action is to remain conservative from a vascular standpoint.  I did review her testing and it appears stable and consistent with last testing.  She is asymptomatic from a strokelike standpoint.  We will continue to follow her on an annual basis with repeat noninvasive testing, including a carotid duplex.  She is also been complaining of some leg swelling which is giving her symptoms of heaviness, tiredness, and cramps at night when she sleeps.  We did give her compression stocking prescription in the 20 to 30 mm pressure gradient range.  We also instructed her on how to wear them on a daily basis. I did discuss vascular risk factors as they pertain to the progression of vascular disease including controlling hypertension and dyslipidemia.  These risk factors are currently stable.  The patient can continue taking their current medication regimen as previously planned.  This was all discussed in full with complete understanding.    Thank you for allowing me to participate in the care of your patient.  Please do not hesitate with any questions or concerns.  I will keep you aware of any further encounters with Edie Oconnor.        Sincerely yours,         Noah Treviño, DO

## 2020-06-14 ENCOUNTER — HOSPITAL ENCOUNTER (EMERGENCY)
Facility: HOSPITAL | Age: 65
Discharge: HOME | End: 2020-06-14
Attending: FAMILY MEDICINE | Admitting: FAMILY MEDICINE

## 2020-06-14 ENCOUNTER — APPOINTMENT (OUTPATIENT)
Dept: GENERAL RADIOLOGY | Facility: HOSPITAL | Age: 65
End: 2020-06-14

## 2020-06-14 ENCOUNTER — APPOINTMENT (OUTPATIENT)
Dept: CT IMAGING | Facility: HOSPITAL | Age: 65
End: 2020-06-14

## 2020-06-14 ENCOUNTER — NURSE TRIAGE (OUTPATIENT)
Dept: CALL CENTER | Facility: HOSPITAL | Age: 65
End: 2020-06-14

## 2020-06-14 VITALS
HEIGHT: 65 IN | RESPIRATION RATE: 15 BRPM | TEMPERATURE: 98.3 F | HEART RATE: 75 BPM | DIASTOLIC BLOOD PRESSURE: 77 MMHG | WEIGHT: 168 LBS | OXYGEN SATURATION: 92 % | BODY MASS INDEX: 27.99 KG/M2 | SYSTOLIC BLOOD PRESSURE: 131 MMHG

## 2020-06-14 DIAGNOSIS — R20.0 LEFT FACIAL NUMBNESS: Primary | ICD-10-CM

## 2020-06-14 LAB
ABO GROUP BLD: NORMAL
ALBUMIN SERPL-MCNC: 4.7 G/DL (ref 3.5–5.2)
ALBUMIN/GLOB SERPL: 1.7 G/DL
ALP SERPL-CCNC: 102 U/L (ref 39–117)
ALT SERPL W P-5'-P-CCNC: 28 U/L (ref 1–33)
ANION GAP SERPL CALCULATED.3IONS-SCNC: 17 MMOL/L (ref 5–15)
APTT PPP: 27.1 SECONDS (ref 24.1–35)
AST SERPL-CCNC: 34 U/L (ref 1–32)
BASOPHILS # BLD AUTO: 0.04 10*3/MM3 (ref 0–0.2)
BASOPHILS NFR BLD AUTO: 0.4 % (ref 0–1.5)
BILIRUB SERPL-MCNC: 0.7 MG/DL (ref 0.2–1.2)
BLD GP AB SCN SERPL QL: NEGATIVE
BUN BLD-MCNC: 15 MG/DL (ref 8–23)
BUN/CREAT SERPL: 22.1 (ref 7–25)
CALCIUM SPEC-SCNC: 9.2 MG/DL (ref 8.6–10.5)
CHLORIDE SERPL-SCNC: 107 MMOL/L (ref 98–107)
CO2 SERPL-SCNC: 22 MMOL/L (ref 22–29)
CREAT BLD-MCNC: 0.68 MG/DL (ref 0.57–1)
DEPRECATED RDW RBC AUTO: 43.7 FL (ref 37–54)
EOSINOPHIL # BLD AUTO: 0.43 10*3/MM3 (ref 0–0.4)
EOSINOPHIL NFR BLD AUTO: 4.6 % (ref 0.3–6.2)
ERYTHROCYTE [DISTWIDTH] IN BLOOD BY AUTOMATED COUNT: 13.4 % (ref 12.3–15.4)
GFR SERPL CREATININE-BSD FRML MDRD: 87 ML/MIN/1.73
GLOBULIN UR ELPH-MCNC: 2.7 GM/DL
GLUCOSE BLD-MCNC: 144 MG/DL (ref 65–99)
GLUCOSE BLDC GLUCOMTR-MCNC: 142 MG/DL (ref 70–130)
HCT VFR BLD AUTO: 42 % (ref 34–46.6)
HGB BLD-MCNC: 14.2 G/DL (ref 12–15.9)
HOLD SPECIMEN: NORMAL
HOLD SPECIMEN: NORMAL
IMM GRANULOCYTES # BLD AUTO: 0.03 10*3/MM3 (ref 0–0.05)
IMM GRANULOCYTES NFR BLD AUTO: 0.3 % (ref 0–0.5)
INR PPP: 0.91 (ref 0.91–1.09)
LYMPHOCYTES # BLD AUTO: 2.7 10*3/MM3 (ref 0.7–3.1)
LYMPHOCYTES NFR BLD AUTO: 28.8 % (ref 19.6–45.3)
MCH RBC QN AUTO: 29.8 PG (ref 26.6–33)
MCHC RBC AUTO-ENTMCNC: 33.8 G/DL (ref 31.5–35.7)
MCV RBC AUTO: 88.2 FL (ref 79–97)
MONOCYTES # BLD AUTO: 0.72 10*3/MM3 (ref 0.1–0.9)
MONOCYTES NFR BLD AUTO: 7.7 % (ref 5–12)
NEUTROPHILS # BLD AUTO: 5.45 10*3/MM3 (ref 1.7–7)
NEUTROPHILS NFR BLD AUTO: 58.2 % (ref 42.7–76)
NRBC BLD AUTO-RTO: 0 /100 WBC (ref 0–0.2)
PLATELET # BLD AUTO: 275 10*3/MM3 (ref 140–450)
PMV BLD AUTO: 10.1 FL (ref 6–12)
POTASSIUM BLD-SCNC: 3.6 MMOL/L (ref 3.5–5.2)
PROT SERPL-MCNC: 7.4 G/DL (ref 6–8.5)
PROTHROMBIN TIME: 11.9 SECONDS (ref 11.9–14.6)
RBC # BLD AUTO: 4.76 10*6/MM3 (ref 3.77–5.28)
RH BLD: POSITIVE
SODIUM BLD-SCNC: 146 MMOL/L (ref 136–145)
T&S EXPIRATION DATE: NORMAL
TROPONIN T SERPL-MCNC: <0.01 NG/ML (ref 0–0.03)
WBC NRBC COR # BLD: 9.37 10*3/MM3 (ref 3.4–10.8)
WHOLE BLOOD HOLD SPECIMEN: NORMAL
WHOLE BLOOD HOLD SPECIMEN: NORMAL

## 2020-06-14 PROCEDURE — 85610 PROTHROMBIN TIME: CPT | Performed by: FAMILY MEDICINE

## 2020-06-14 PROCEDURE — 85025 COMPLETE CBC W/AUTO DIFF WBC: CPT | Performed by: FAMILY MEDICINE

## 2020-06-14 PROCEDURE — 84484 ASSAY OF TROPONIN QUANT: CPT | Performed by: FAMILY MEDICINE

## 2020-06-14 PROCEDURE — 93005 ELECTROCARDIOGRAM TRACING: CPT | Performed by: FAMILY MEDICINE

## 2020-06-14 PROCEDURE — 99284 EMERGENCY DEPT VISIT MOD MDM: CPT

## 2020-06-14 PROCEDURE — 80053 COMPREHEN METABOLIC PANEL: CPT | Performed by: FAMILY MEDICINE

## 2020-06-14 PROCEDURE — 82962 GLUCOSE BLOOD TEST: CPT

## 2020-06-14 PROCEDURE — 86850 RBC ANTIBODY SCREEN: CPT | Performed by: FAMILY MEDICINE

## 2020-06-14 PROCEDURE — 70450 CT HEAD/BRAIN W/O DYE: CPT

## 2020-06-14 PROCEDURE — 93010 ELECTROCARDIOGRAM REPORT: CPT | Performed by: INTERNAL MEDICINE

## 2020-06-14 PROCEDURE — 71045 X-RAY EXAM CHEST 1 VIEW: CPT

## 2020-06-14 PROCEDURE — 86900 BLOOD TYPING SEROLOGIC ABO: CPT | Performed by: FAMILY MEDICINE

## 2020-06-14 PROCEDURE — 86901 BLOOD TYPING SEROLOGIC RH(D): CPT | Performed by: FAMILY MEDICINE

## 2020-06-14 PROCEDURE — 85730 THROMBOPLASTIN TIME PARTIAL: CPT | Performed by: FAMILY MEDICINE

## 2020-06-14 RX ORDER — CALCIUM CARBONATE/VITAMIN D3 500-10/5ML
1 LIQUID (ML) ORAL DAILY
COMMUNITY

## 2020-06-15 NOTE — TELEPHONE ENCOUNTER
"About 30 minutes ago numbness inl ips ,dizziness, bp up some, arm has been hurting on right few days, now speech is thick she says and not feeling right this afternoon, wants to drive herself to ER told her no call 911, she will now.     Reason for Disposition  • [1] Numbness (i.e., loss of sensation) of the face, arm / hand, or leg / foot on one side of the body AND [2] sudden onset AND [3] present now    Additional Information  • Negative: [1] SEVERE weakness (i.e., unable to walk or barely able to walk, requires support) AND [2] new onset or worsening  • Negative: [1] Weakness (i.e., paralysis, loss of muscle strength) of the face, arm / hand, or leg / foot on one side of the body AND [2] sudden onset AND [3] present now  • Negative: [1] Loss of speech or garbled speech AND [2] sudden onset AND [3] present now    Answer Assessment - Initial Assessment Questions  1. SYMPTOM: \"What is the main symptom you are concerned about?\" (e.g., weakness, numbness)      Numbness  Lips speech thick  2. ONSET: \"When did this start?\" (minutes, hours, days; while sleeping)      Today this afternoon 30 minutes ago  3. LAST NORMAL: \"When was the last time you were normal (no symptoms)?\"      45 minutes last normal  4. PATTERN \"Does this come and go, or has it been constant since it started?\"  \"Is it present now?\"      Comes and goes  5. CARDIAC SYMPTOMS: \"Have you had any of the following symptoms: chest pain, difficulty breathing, palpitations?\"      palpitations  6. NEUROLOGIC SYMPTOMS: \"Have you had any of the following symptoms: headache, dizziness, vision loss, double vision, changes in speech, unsteady on your feet?\"      Dizziness, headache, tingling lips, eyes not focusing,speech  thick  7. OTHER SYMPTOMS: \"Do you have any other symptoms?\"      Dizziness, headache numbness lips.   8. PREGNANCY: \"Is there any chance you are pregnant?\" \"When was your last menstrual period?\"      no    Protocols used: NEUROLOGIC " DEFICIT-ADULT-AH

## 2020-06-15 NOTE — DISCHARGE INSTRUCTIONS
As we discussed, please return to the ED if you have any recurrence of symptoms or for any other concern.  You can expect a call from Dr. Mary Kate Johnson's office tomorrow.

## 2020-06-15 NOTE — ED NOTES
"Pt presents to the ED with complaints of right sided facial \"drawing up\" around noon, then left sided facial numbness at 1800 today. Pt states hx of bells palsy, states \"I think its just the bells palsy acting up again.\" Pt denies other complaints.     Umer Shaver RN  06/14/20 2005    "

## 2020-06-15 NOTE — ED PROVIDER NOTES
"Subjective   Ms. Oconnor is a 65-year-old female with a history significant for Bell's palsy but also possible TIAs that had work-ups including an MRI in 2016 which was negative and carotid ultrasounds which were normal in March 2019.  Today at around midday she felt like she was having some discomfort or \"pulling feeling on the left side of her face.  She felt like it was the recurrence of the Bell's palsy.  Around 6:00 tonight she had numbness in her left upper lip as though \"you have been to the dentist\" and she felt like her speech was a little bit slowed.  She also complained of the same time of her an occipital headache and perhaps of significance she did say that she has a history of migraines in the past.  She denied any unilateral weakness in the upper or lower extremities.  She denied fever or other systemic symptoms.  She denies shortness of breath, cough or abdominal pain.          Review of Systems   Neurological: Positive for facial asymmetry and headaches.   All other systems reviewed and are negative.      Past Medical History:   Diagnosis Date   • Depression    • Diverticulitis    • H/O Bell's palsy     Right   • Hiatal hernia    • Hx of colonic polyp    • Neuromuscular disorder (CMS/HCC)     Neuropathy   • PONV (postoperative nausea and vomiting)    • Sleep apnea    • TIA (transient ischemic attack)        Allergies   Allergen Reactions   • Prochlorperazine GI Intolerance       Past Surgical History:   Procedure Laterality Date   • APPENDECTOMY     • CARDIAC CATHETERIZATION     • CHOLECYSTECTOMY     • COLONOSCOPY  12/10/2012    diverticula of sigmoid, tubular adenoma   • COLONOSCOPY N/A 2/24/2017    Tubular adenoma ascending colon, Diverticulosis repeat exam in 5 years   • COLONOSCOPY N/A 5/7/2019    Procedure: COLONOSCOPY WITH ANESTHESIA;  Surgeon: Dom Parker MD;  Location: [facility] ENDOSCOPY;  Service: Gastroenterology   • ELBOW OPEN REDUCTION INTERNAL FIXATION Left    • ENDOSCOPY  08/08/2016 "    [place], [place] Grade B reflux esophagitis   • HYSTERECTOMY     • UPPER GASTROINTESTINAL ENDOSCOPY  08/08/2016    hiatla hernia, [place] Grade B reflux esophagitis       Family History   Problem Relation Age of Onset   • Diabetes Mother    • Hypertension Mother    • Cancer Father    • Heart disease Sister    • Cancer Brother    • Colon polyps Brother    • Colon cancer Neg Hx        Social History     Socioeconomic History   • Marital status:      Spouse name: Not on file   • Number of children: Not on file   • Years of education: Not on file   • Highest education level: Not on file   Tobacco Use   • Smoking status: Never Smoker   • Smokeless tobacco: Never Used   Substance and Sexual Activity   • Alcohol use: Yes     Comment: rarely   • Drug use: No   • Sexual activity: Defer           Objective   Physical Exam   Constitutional: She is oriented to person, place, and time. She appears well-developed and well-nourished.   HENT:   Head: Normocephalic and atraumatic.   Mouth/Throat: Oropharynx is clear and moist.   Eyes: Conjunctivae and EOM are normal.   Neck: Normal range of motion. Neck supple.   Cardiovascular: Normal rate, regular rhythm, normal heart sounds and intact distal pulses.   Pulmonary/Chest: Effort normal and breath sounds normal.   Abdominal: Soft. Bowel sounds are normal.   Musculoskeletal: Normal range of motion.   Neurological: She is alert and oriented to person, place, and time. She has normal strength and normal reflexes. A sensory deficit is present. No cranial nerve deficit. She displays a negative Romberg sign. GCS eye subscore is 4. GCS verbal subscore is 5. GCS motor subscore is 6.   The only real finding on the neurologic exam was a mild numbness in a discrete small area of the left upper lip.   Skin: Skin is warm and dry. Capillary refill takes less than 2 seconds.   Psychiatric: She has a normal mood and affect. Her behavior is normal. Judgment and thought content normal.   Nursing note and  vitals reviewed.      Procedures           ED Course                                           MDM  Number of Diagnoses or Management Options     Amount and/or Complexity of Data Reviewed  Clinical lab tests: ordered and reviewed  Tests in the radiology section of CPT®: ordered and reviewed        Final diagnoses:   Left facial numbness     I discussed the case with Dr. Mary Kate Johnson who felt that this actually could be a manifestation of migraine rather than TIA.  She felt that the patient was safe to go home but that she would arrange for an appointment in the neurology clinic in the next couple days and she took her phone number and medical record details.    I did extensive discussion with the patient, suggesting that this could be a TIA, explaining Dr. Mary Kate Johnson's train of thought and that I would be glad to offer her admission to the hospital if she was at all concerned.  She still is convinced that this is a manifestation of Bell's palsy, states that she understands the risks of going home but would much prefer to go home and follow-up with neurology as an outpatient.  She does state that if she has recurrence of any symptoms that she will immediately call EMS and return to the ED.  She is currently symptom-free and stable for discharge.       eBrt Morales MD  06/14/20 1630

## 2020-06-17 ENCOUNTER — TRANSCRIBE ORDERS (OUTPATIENT)
Dept: ADMINISTRATIVE | Facility: HOSPITAL | Age: 65
End: 2020-06-17

## 2020-06-17 DIAGNOSIS — R20.2 PARESTHESIA OF SKIN: ICD-10-CM

## 2020-06-17 DIAGNOSIS — R47.89 OTHER SPEECH DISTURBANCES: Primary | ICD-10-CM

## 2020-07-01 ENCOUNTER — HOSPITAL ENCOUNTER (OUTPATIENT)
Dept: MRI IMAGING | Facility: HOSPITAL | Age: 65
Discharge: HOME | End: 2020-07-01
Admitting: FAMILY MEDICINE

## 2020-07-01 DIAGNOSIS — R47.89 OTHER SPEECH DISTURBANCES: ICD-10-CM

## 2020-07-01 DIAGNOSIS — R20.2 PARESTHESIA OF SKIN: ICD-10-CM

## 2020-07-01 PROCEDURE — A9577 INJ MULTIHANCE: HCPCS | Performed by: FAMILY MEDICINE

## 2020-07-01 PROCEDURE — 70553 MRI BRAIN STEM W/O & W/DYE: CPT

## 2020-07-01 PROCEDURE — 0 GADOBENATE DIMEGLUMINE 529 MG/ML SOLUTION: Performed by: FAMILY MEDICINE

## 2020-07-01 RX ADMIN — GADOBENATE DIMEGLUMINE 15 ML: 529 INJECTION, SOLUTION INTRAVENOUS at 09:54

## 2020-08-19 ENCOUNTER — OFFICE VISIT (OUTPATIENT)
Dept: NEUROLOGY | Age: 65
End: 2020-08-19
Payer: MEDICARE

## 2020-08-19 VITALS
WEIGHT: 172 LBS | HEART RATE: 68 BPM | HEIGHT: 65 IN | BODY MASS INDEX: 28.66 KG/M2 | SYSTOLIC BLOOD PRESSURE: 122 MMHG | OXYGEN SATURATION: 100 % | DIASTOLIC BLOOD PRESSURE: 81 MMHG

## 2020-08-19 PROCEDURE — 4040F PNEUMOC VAC/ADMIN/RCVD: CPT | Performed by: PHYSICIAN ASSISTANT

## 2020-08-19 PROCEDURE — 1090F PRES/ABSN URINE INCON ASSESS: CPT | Performed by: PHYSICIAN ASSISTANT

## 2020-08-19 PROCEDURE — 99213 OFFICE O/P EST LOW 20 MIN: CPT | Performed by: PHYSICIAN ASSISTANT

## 2020-08-19 PROCEDURE — G8400 PT W/DXA NO RESULTS DOC: HCPCS | Performed by: PHYSICIAN ASSISTANT

## 2020-08-19 PROCEDURE — 3017F COLORECTAL CA SCREEN DOC REV: CPT | Performed by: PHYSICIAN ASSISTANT

## 2020-08-19 PROCEDURE — 1036F TOBACCO NON-USER: CPT | Performed by: PHYSICIAN ASSISTANT

## 2020-08-19 PROCEDURE — G8419 CALC BMI OUT NRM PARAM NOF/U: HCPCS | Performed by: PHYSICIAN ASSISTANT

## 2020-08-19 PROCEDURE — 1123F ACP DISCUSS/DSCN MKR DOCD: CPT | Performed by: PHYSICIAN ASSISTANT

## 2020-08-19 PROCEDURE — G8427 DOCREV CUR MEDS BY ELIG CLIN: HCPCS | Performed by: PHYSICIAN ASSISTANT

## 2020-08-19 RX ORDER — VITAMIN E 268 MG
400 CAPSULE ORAL DAILY
COMMUNITY
End: 2021-03-08

## 2020-08-19 RX ORDER — UBIDECARENONE 75 MG
100 CAPSULE ORAL DAILY
COMMUNITY

## 2020-08-19 RX ORDER — CALCIUM CARBONATE/VITAMIN D3 500-10/5ML
1 LIQUID (ML) ORAL DAILY
COMMUNITY

## 2020-08-19 RX ORDER — MULTIPLE VITAMINS W/ MINERALS TAB 9MG-400MCG
1 TAB ORAL DAILY
COMMUNITY

## 2020-08-19 NOTE — PROGRESS NOTES
Pressure Mother     High Cholesterol Mother     Stroke Mother     Other Sister         Blood CLots    Cancer Brother         Pancreatic/Colon    Cancer Father        Social History  Social History     Tobacco Use   Smoking Status Never Smoker   Smokeless Tobacco Never Used     Social History     Substance and Sexual Activity   Alcohol Use No     Social History     Substance and Sexual Activity   Drug Use No         Current Outpatient Medications   Medication Sig Dispense Refill    Multiple Vitamins-Minerals (THERA M PLUS) TABS Take 1 tablet by mouth daily      vitamin B-12 (CYANOCOBALAMIN) 100 MCG tablet Take 100 mcg by mouth daily      Zinc 30 MG CAPS Take 1 capsule by mouth daily      vitamin E 400 UNIT capsule Take 400 Units by mouth daily      omeprazole (PRILOSEC) 20 MG delayed release capsule Take 20 mg by mouth daily      loperamide (IMODIUM) 2 MG capsule Take 2 mg by mouth as needed       dicyclomine (BENTYL) 20 MG tablet Take 20 mg by mouth every 6 hours as needed       lisinopril (PRINIVIL;ZESTRIL) 10 MG tablet Take 10 mg by mouth daily       aspirin 325 MG EC tablet Take 325 mg by mouth daily       celecoxib (CELEBREX) 200 MG capsule Take 200 mg by mouth daily as needed       sucralfate (CARAFATE) 1 GM tablet Take 1 g by mouth 4 times daily        No current facility-administered medications for this visit. Allergies: Other    REVIEW OF SYSTEMS     Constitutional: []? Fever []? Sweats []? Chills []? Recent Injury   [x]? Denies all unless marked  HENT:[]? Headache  []? Head Injury  []? Sore Throat  []? Ear Pain  []? Dizziness []? Hearing Loss   [x]? Denies all unless marked  Spine:  []? Neck pain  []? Back pain  []? Sciaticia  [x]? Denies all unless marked  Cardiovascular:[]? Chest Pain []? Palpitations []? Heart Disease  [x]? Denies all unless marked  Pulmonary: []? Shortness of Breath []? Cough   [x]? Denies all unless marked  Gastrointestinal:  []? Abdominal Pain  []? Blood in Stool []?Diarrhea []? Constipation []? Nausea  []? Vomiting  [x]? Denies all unless marked  Genitourinary:  []? Dysuria []? Frequency  []? Incontinence []? Urgency   [x]? Denies all unless marked  Musculoskeletal: []? Arthralgia  []? Myalgias []? Muscle cramps  []? Muscle twitches   [x]? Denies all unless marked   Extremities:   []? Pain   []? Swelling   [x]? Denies all unless marked  Skin:[]? Rash  []? Color Change  [x]? Denies all unless marked  Neurological:[]? Visual Disturbance []? Double Vision []? Slurred Speech []? Trouble swallowing  []? Vertigo []? Tingling []? Numbness []? Weakness []? Loss of Balance   []? Loss of Consciousness []? Memory Loss []? Seizures  [x]? Denies all unless marked  Psychiatric/Behavioral:[]? Depression []? Anxiety  [x]? Denies all unless marked  Sleep: []? Insomnia []? Sleep Disturbance []? Snoring [x]? Restless Legs []? Daytime Sleepiness [x]? Sleep Apnea  [x]? Denies all unless marked    The MA has completed the ROS with the patient. I have reviewed it in its' entirety with the patient and agree with the documentation. PHYSICAL EXAM  /81   Pulse 68   Ht 5' 5\" (1.651 m)   Wt 172 lb (78 kg)   SpO2 100%   BMI 28.62 kg/m²      Constitutional - No acute distress    HEENT- Conjunctiva normal.  No scars, masses, or lesions over external nose or ears, no neck masses noted, no jugular vein distension, no bruit  Cardiac- Regular rate and rhythm  Pulmonary- Clear to auscultation, good expansion, normal effort without use of accessory muscles  Musculoskeletal - No significant wasting of muscles noted, no bony deformities  Extremities - No clubbing, cyanosis or edema  Skin - Warm, dry, and intact. No rash, erythema, or pallor  Psychiatric - Mood, affect, and behavior appear normal      Neurologic:  Extraocular movements are intact without nystagmus. Visual fields are full to confrontation. Facial movements are symmetrical and normal.  Speech is precise.   Extremity strength is normal in both uppers and lowers. Deep tendon reflexes are intact and symmetrical.  Rapid alternating movements are unimpaired. Finger-to-nose testing is performed well, without dysmetria. Gait is normal.    I reviewed the following studies:      []  :  Clinical laboratory test results    []  :  Radiology reports    [x]  :  Review and summarization of medical records and/or obtain medical records     []  :  Previous/recent polysomnogram report(s)    []  :  [place] Sleepiness Scale      [x]  :  Compliance download: The auto CPAP is set at a pressure of 7cm to 15.8cm. Compliance download shows that she uses device: 93% of the time;  percentage of days with usage >=4 hours: 87%. AHI: 2.3    Assessment:       ICD-10-CM    1. Obstructive sleep apnea  G47.33    2. CPAP (continuous positive airway pressure) dependence  Z99.89           []  :  Stable     []  :  Improved                       [x]  :  Well controlled              []  :  Resolving     []  :  Resolved     []  :  Inadequately controlled     []  :  Worsening     []  :  Additional workup planned    Patient is compliant and benefiting from therapy as indicated by compliance evaluation and patient report. Plan:     No orders of the defined types were placed in this encounter. 1.   Patient advised of the etiology,  pathophysiology, diagnosis, treatment options, and risks of untreated MELVIN. Risks may include, but are not limited to  hypertension, coronary artery disease, diabetes, stroke, weight gain, impaired cognition, daytime somnolence,  and motor vehicle accidents. Advised to abstain from driving or operating heavy machinery when drowsy and the use of respiratory suppressants. 2.  The following educational material has been included in this visit after visit summary for your review: MELVIN/PAP guidelines-Discussed with the patient and all questions fully answered. 3.  The current medical regimen is effective;  continue present plan.   4.  Continue CPAP  5. Follow up in 1 year        Note:  A total of >50% (>8 minutes) of 15 minutes was spent discussing the pathophysiology and treatment and/or coordination of care of the above diagnoses.

## 2020-08-19 NOTE — PATIENT INSTRUCTIONS
Patient education: Sleep apnea in adults       INTRODUCTION -- Normally during sleep, air moves through the throat and in and out of the lungs at a regular rhythm. In a person with sleep apnea, air movement is periodically diminished or stopped. There are two types of sleep apnea: obstructive sleep apnea and central sleep apnea. In obstructive sleep apnea, breathing is abnormal because of narrowing or closure of the throat. In central sleep apnea, breathing is abnormal because of a change in the breathing control and rhythm. Sleep apnea is a serious condition that can affect a person's ability to safely perform normal daily activities and can affect long term health. Approximately 25 percent of adults are at risk for sleep apnea of some degree. Men are more commonly affected than women. Other risk factors include middle and older age, being overweight or obese, and having a small mouth and throat. This topic review focuses on the most common type of sleep apnea in adults, obstructive sleep apnea (MELVIN). HOW SLEEP APNEA OCCURS -- The throat is surrounded by muscles that control the airway for speaking, swallowing, and breathing. During sleep, these muscles are less active, and this causes the throat to narrow. In most people, this narrowing does not affect breathing. In others, it can cause snoring, sometimes with reduced or completely blocked airflow. A completely blocked airway without airflow is called an obstructive apnea. Partial obstruction with diminished airflow is called a hypopnea. A person may have apnea and hypopnea during sleep. Insufficient breathing due to apnea or hypopnea causes oxygen levels to fall and carbon dioxide to rise. Because the airway is blocked, breathing faster or harder does not help to improve oxygen levels until the airway is reopened. Typically, the obstruction requires the person to awaken to activate the upper airway muscles.  Once the airway is opened, the person then takes several deep breaths to catch up on breathing. As the person awakens, he or she may move briefly, snort or snore, and take a deep breath. Less frequently, a person may awaken completely with a sensation of gasping, smothering, or choking. If the person falls back to sleep quickly, he or she will not remember the event. Many people with sleep apnea are unaware of their abnormal breathing in sleep, and all patients underestimate how often their sleep is interrupted. Awakening from sleep causes sleep to be unrefreshing and causes fatigue and daytime sleepiness. Anatomic causes of obstructive sleep apnea --  Most patients have MELVIN because of a small upper airway. As the bones of the face and skull develop, some people develop a small lower face, a small mouth, and a tongue that seems too large for the mouth. These features are genetically determined, which explains why MELVIN tends to cluster in families. Obesity is another major factor. Tonsil enlargement can be an important cause, especially in children. SLEEP APNEA SYMPTOMS -- The main symptoms of MELVIN are loud snoring, fatigue, and daytime sleepiness. However, some people have no symptoms. For example, if the person does not have a bed partner, he or she may not be aware of the snoring. Fatigue and sleepiness have many causes and are often attributed to overwork and increasing age. As a result, a person may be slow to recognize that they have a problem. A bed partner or spouse often prompts the patient to seek medical care. Other symptoms may include one or more of the following:  ?Restless sleep  ? Awakening with choking, gasping, or smothering  ? Morning headaches, dry mouth, or sore throat  ? Waking frequently to urinate  ? Awakening unrested, groggy  ? Low energy, difficulty concentrating, memory impairment    Risk factors -- Certain factors increase the risk of sleep apnea.   ?Increasing age - MELVIN occurs at all ages, but it is more common in middle and older age adults. ?Male sex - MELVIN is two times more common in men, especially in middle age. ?Obesity - The more obese a person is, the more likely he or she is to have MELVIN. ? Sedation from medication or alcohol - This interferes with the ability to awaken from sleep and can lengthen periods of apnea (no breathing), with potentially dangerous consequences. ? Abnormality of the airway. SLEEP APNEA CONSEQUENCES -- Complications of sleep apnea can include daytime sleepiness and difficulty concentrating. The consequence of this is an increased risk of accidents and errors in daily activities. Studies have shown that people with severe MELVNI are more than twice as likely to be involved in a motor vehicle accident as people without these conditions. People with MELVIN are encouraged to discuss options for driving, working, and performing other high-risk tasks with a healthcare provider. In addition, people with untreated MELVIN may have an increased risk of cardiovascular problems such as high blood pressure, heart attack, abnormal heart rhythms, or stroke. This risk may be due to changes in the heart rate and blood pressure that occur during sleep. SLEEP APNEA DIAGNOSIS -- The diagnosis of MELVIN is best made by a knowledgeable sleep medicine specialist who has an understanding of the individual's health issues. The diagnosis is usually based upon the person's medical history, physical examination, and testing, including:  ? A complaint of snoring and ineffective sleep  ? Neck size (greater than 16 inches in men or 14 inches in women) is associated with an increased risk of sleep apnea  ? A small upper airway: difficulty seeing the throat because of a tongue that is large for the mouth  ? High blood pressure, especially if it is resistant to treatment  ? If a bed partner has observed the patient during episodes of stopped breathing (apnea), choking, or gasping during sleep, there is a strong possibility of sleep (continuous positive airway pressure)  device uses an air-tight attachment to the nose, typically a mask, connected to a tube and a blower which generates the pressure. Devices that fit comfortably into the nasal opening, rather than over the nose, are also available. CPAP should be used any time the person sleeps (day or night). The CPAP device is usually used for the first time in the sleep lab, where a technician can adjust the pressure and select the best equipment to keep the airway open. Alternatively, an auto device with a self-adjusting pressure feature, provided with proper education and training, can get treatment started without another sleep test. While the treatment may seem uncomfortable, noisy, or bulky at first, most people accept the treatment after experiencing better sleep. However, difficulty with mask comfort and nasal congestion prevent up to 50 percent of people from using the treatment on a regular basis. Continued follow up with a healthcare provider helps to ensure that the treatment is effective and comfortable. Information from the CPAP machine is often used by physicians, therapists, and insurers to track the success of treatment. CPAP can be delivered with different features to improve comfort and solve problems that may come up during treatment. Changes in treatment may be needed if symptoms do not improve or if the persons condition changes, such as a gain or loss of weight. Adjust sleep position -- Adjusting sleep position (to stay off the back) may help improve sleep quality in people who have MELVIN when sleeping on the back. However, this is difficult to maintain throughout the night and is rarely an adequate solution. Weight loss -- Weight loss may be helpful for obese or overweight patients. Weight loss may be accomplished with dietary changes, exercise, and/or surgical treatment.  However, it can be difficult to maintain weight loss; the five-year success of non-surgical weight loss is only 5 percent, meaning that 95 percent of people regain lost weight. Avoid alcohol and other sedatives -- Alcohol can worsen sleepiness, potentially increasing the risk of accidents or injury. People with MELVIN are often counseled to drink little to no alcohol, even during the daytime. Similarly, people who take anti-anxiety medications or sedatives to sleep should speak with their healthcare provider about the safety of these medications. People with MELVIN must notify all healthcare providers, including surgeons, about their condition and the potential risks of being sedated. People with MELVIN who are given anesthesia and/or pain medications require special management and close monitoring to reduce the risk of a blocked airway. Dental devices -- A dental device, called an oral appliance or mandibular advancement device, can reposition the jaw (mandible), bringing the tongue and soft palate forward as well. This may relieve obstruction in some people. This treatment is excellent for reducing snoring, although the effect on MELVIN is sometimes more limited. As a result, dental devices are best used for mild cases of MELVIN when relief of snoring is the main goal. Failure to tolerate and accept CPAP is another indication for dental devices. While dental devices are not as effective as CPAP for MELVIN, some patients prefer a dental device to CPAP. Side effects of dental devices are generally minor but may include changes to the bite with prolonged use. Surgical treatment -- Surgery is an alternative therapy for patients who cannot tolerate or do not improve with nonsurgical treatments such as CPAP or oral devices. Surgery can also be used in combination with other nonsurgical treatments. Surgical procedures reshape structures in the upper airways or surgically reposition bone or soft tissue. Uvulopalatopharyngoplasty (UPPP) removes the uvula and excessive tissue in the throat, including the tonsils, if present. Other procedures, such as maxillomandibular advancement (MMA), address both the upper and lower pharyngeal airway more globally. UPPP alone has limited success rates (less than 50 percent) and people can relapse (when MELVIN symptoms return after surgery). As a result, this surgery is only recommended in a minority of people and should be considered with caution. MMA may have a higher success rate, particularly in people with abnormal jaw (maxilla and mandible) anatomy, but it is the most complicated procedure. A newer surgical approach, nerve stimulation to protrude the tongue, has promising success rates in very selected people. Tracheostomy creates a permanent opening in the neck. It is reserved for people with severe disease in whom less drastic measures have failed or are inappropriate. Although it is always successful in eliminating obstructive sleep apnea, tracheostomy requires significant lifestyle changes and carries some serious risks (eg, infection, bleeding, blockage). All surgical treatments require discussions about the goals of treatment, the expected outcomes, and potential complications. Hypoglossal nerve stimulator- \"Inspire\" device    PAP treatment failure:  Possible causes of treatment failure include nonadherence or suboptimal adherence, weight gain, an inappropriate level of prescribed positive pressure, or an additional disorder causing sleepiness (eg, narcolepsy) that may require alterations in the therapeutic regimen. A review of medications should also be undertaken since many drugs may lead to sleepiness. Inadequate sleep time may also negate the expected effects from treatment of MELVIN. Also, pt's can have persistent hypersomnolence associated with sleep apnea even in the presence of adequate therapy and at those times Provigil or Nuvigil or other stimulants may be indicated.     Once the patient's positive airway pressure therapy has been optimized and symptoms resolved, a regimen of long-term follow-up should be established. Annual visits are reasonable, with more frequent visits in between if new issues arise. The purpose of long-term follow-up is to assess usage and monitor for recurrent MELVIN, new side effects, air leakage, and fluctuations in body weight. WHERE TO GET MORE INFORMATION -- Your healthcare provider is the best source of information for questions and concerns related to your medical problem. Organizations  American Sleep [employer]nea Association  Provides information about sleep apnea to the public, publishes a newsletter, and serves as an advocate for people with the disorder. Isaac, [address]   [email or web address]@[email or web address] org   [employer].. org   Tel: [phone]   Fax: [facility] organization that works to [employer] and safety by promoting public understanding of sleep and sleep disorders. Supports sleep-related education, research, and advocacy; produces and distributes educational materials to the public and healthcare professionals; and offers postdoctoral fellowships and grants for sleep researchers. [address]   Pankaj@[email or web address] org   [email or web address]. org   Tel: [phone]   Fax: [phone]    Important information:  Medicare/private insurance CPAP/BiPAP/APAP requirements:  Medicare/private insurance has specific requirements for PAP compliance that must be met during the first 90 days of use to continue coverage for CPAP/BiPAP/APAP  from day 91 and beyond. The policy requires that patients use a PAP device 4 hours per 24 hour period, at least 70% of the time over a 30 day period. This data must be downloaded as a report direct from the PAP devices. This is called a compliance download. Your PAP supplier will assist you in this matter.      Reminder:  Please bring a copy of the compliance download to your next office visit or have your supplier fax it to our office prior to your office visit. Note:  Where applicable, we will utilize PAP device efficiency reports, additional testing, and face-to-face  clinical evaluation subsequent to any treatment, changes in treatment, and continued treatment. Caution:  Please abstain from driving or engaging in other activities which may be hazardous in the presence of diminished alertness or daytime drowsiness. And avoid the use of sedatives or alcohol, which can worsen sleep apnea and daytime drowsiness. Mask suggestions:  -     Resmed Airfit N20 (Nasal) or F20 (Full face mask). They conform to your face, thus decreasing the potential for mask leakage. You might like the [employer] (full face mask). It has a \"memory foam\" like cushion. The AirFit F30 is a smaller style full face mask designed to sit low on and cover less of your face for fewer facial marks. AirFit N30i has a top of the head tube with a nasal mask. AirFit P10 reported to be the most comfortable nasal pillow mask. Resmed Mirage FX reported to be the most comfortable nasal mask. Resmed Mirage [place] reported to be the most comfortable hybrid mask. Respironics: You might also like to try a nasal mask called a Dreamwear nasal mask or the Dreamwear nasal pillow. Another suggestion is the [facility], it is a minimal contact full face mask. The [employer] incredible under the nose design makes it the only full face mask that won't cause red marks on the bridge of your nose when compared to other full face masks. The Dreamwear full face mask has a  soft feel, unique in-frame air-flow, and innovative air tube connection at the top of the head for the ultimate in sleep comfort. Comfort Gel Blue. Dreamwear gel pillows. [employer]: Brevida nasal pillow mask and Simplus FFM    The use of a memory foam CPAP pillow supports the head and neck throughout the night.

## 2020-09-11 ENCOUNTER — OFFICE VISIT (OUTPATIENT)
Dept: NEUROLOGY | Facility: CLINIC | Age: 65
End: 2020-09-11

## 2020-09-11 VITALS
SYSTOLIC BLOOD PRESSURE: 130 MMHG | BODY MASS INDEX: 29.49 KG/M2 | HEART RATE: 67 BPM | HEIGHT: 65 IN | DIASTOLIC BLOOD PRESSURE: 86 MMHG | WEIGHT: 177 LBS | OXYGEN SATURATION: 95 %

## 2020-09-11 DIAGNOSIS — G43.109 COMPLICATED MIGRAINE: Primary | ICD-10-CM

## 2020-09-11 PROCEDURE — 99214 OFFICE O/P EST MOD 30 MIN: CPT | Performed by: PHYSICIAN ASSISTANT

## 2020-09-11 RX ORDER — ROSUVASTATIN CALCIUM 5 MG/1
5 TABLET, COATED ORAL
COMMUNITY
Start: 2020-08-04 | End: 2021-03-04

## 2020-09-11 NOTE — PROGRESS NOTES
Neurology Progress Note      Chief Complaint:    Transient left facial numbness and weakness    Subjective     Subjective:  This is a pleasant 65-year-old female presents today for follow-up from 8 June 2020 ER visit at which time the patient had transient left facial numbness and weakness that lasted several hours to 1 day.  By day 2, the symptoms have completely resolved, however, she states that during that time, she had difficulty speaking.  When asked to elucidate that further, it seems that primarily she had difficulty with articulation of words, however, may have also had some degree of word finding difficulties.  She reports that in the past, she has had Bell's palsy and she thought that is what was occurring, however, had rapid resolution as stated above.  However, despite this, she states that she felt somewhat fatigued or not her usual self for up to 1 week, thereafter.  Patient states that she had a similar episode several years ago when she had transient left facial numbness that was diagnosed also has Bell's palsy, however, had relatively quick resolution of those symptoms and then was left feeling poorly again approximately 1 week, thereafter.  She also has a history of typical migraines.  CT of the head was negative.      Past Medical History:   Diagnosis Date   • Depression    • Diverticulitis    • H/O Bell's palsy     Right   • Hiatal hernia    • Hx of colonic polyp    • Neuromuscular disorder (CMS/HCC)     Neuropathy   • PONV (postoperative nausea and vomiting)    • Sleep apnea    • TIA (transient ischemic attack)      Past Surgical History:   Procedure Laterality Date   • APPENDECTOMY     • CARDIAC CATHETERIZATION     • CHOLECYSTECTOMY     • COLONOSCOPY  12/10/2012    diverticula of sigmoid, tubular adenoma   • COLONOSCOPY N/A 2/24/2017    Tubular adenoma ascending colon, Diverticulosis repeat exam in 5 years   • COLONOSCOPY N/A 5/7/2019    Procedure: COLONOSCOPY WITH ANESTHESIA;  Surgeon:  Dom Parker MD;  Location: [facility] ENDOSCOPY;  Service: Gastroenterology   • ELBOW OPEN REDUCTION INTERNAL FIXATION Left    • ENDOSCOPY  08/08/2016    HH, [place] Grade B reflux esophagitis   • HYSTERECTOMY     • UPPER GASTROINTESTINAL ENDOSCOPY  08/08/2016    hiatla hernia, [place] Grade B reflux esophagitis     Family History   Problem Relation Age of Onset   • Diabetes Mother    • Hypertension Mother    • Cancer Father    • Heart disease Sister    • Cancer Brother    • Colon polyps Brother    • Colon cancer Neg Hx      Social History     Tobacco Use   • Smoking status: Never Smoker   • Smokeless tobacco: Never Used   Substance Use Topics   • Alcohol use: Yes     Comment: rarely   • Drug use: No       Medications:  Current Outpatient Medications   Medication Sig Dispense Refill   • aspirin  MG tablet Take 325 mg by mouth Daily.     • loperamide (IMODIUM) 2 MG capsule Take 2 mg by mouth As Needed for diarrhea.     • Multiple Vitamins-Minerals (MULTIVITAMIN WITH MINERALS) tablet tablet Take 1 tablet by mouth Daily.     • rosuvastatin (CRESTOR) 5 MG tablet Take 5 mg by mouth every night at bedtime.     • vitamin E 400 UNIT capsule Take 400 Units by mouth Daily.     • Zinc 30 MG capsule Take 1 capsule by mouth Daily.     • Rimegepant Sulfate (rimegepant) 75 MG tablet dispersible Take 75 mg by mouth Take As Directed. 2 tablet 0     No current facility-administered medications for this visit.        Allergies:    Prochlorperazine    Review of Systems:   -A 14 point review of systems is completed and is negative.      Objective      Vital Signs  Heart Rate:  [67] 67  BP: (130)/(86) 130/86    Physical Exam:    General Exam:  Head:  Normocephalic, atraumatic.  HEENT: PERRLA.  Full EOM.  Neck:  No lymphadenopathy, thyromegaly or bruit.  Cardiac:  Regular rate and rhythm.  Normal S1, S2.  No murmur, rub or gallop.  Lungs:  Clear to auscultation bilaterally.  No wheeze, rales or rhonchi.  Abdomen:  Non-tender, Non-distended.   Bowel sounds normoactive.  Extremities: Full peripheral pulses.  No clubbing, cyanosis or edema.  Skin: No ulceration, breakdown or rash.      Neurologic Exam:  Mental Status:    -Awake. Alert. Oriented to person, place & time.  -No word finding difficulties.  -No aphasia.  -No dysarthria.  -Follows simple commands.     CN II:  Full visual fields with confrontation.  Pupils equally reactive to light.  CN III, IV, VI:  Extraocular muscles function intact with no nystagmus.  CN V:  Facial sensory is symmetric.  CN VII:  Facial motor symmetric.  CN VIII:  Gross hearing intact bilaterally.  CN IX/X:  Palate elevates symmetrically.  CN XI:  Shoulder shrug symmetric.  CN XII:  Tongue is midline on protrusion.     Motor: (strength out of 5:  1= minimal movement, 2 = movement in plane of gravity, 3 = movement against gravity, 4 = movement against some resistance, 5 = full strength)     -5/5 in bilateral biceps, triceps, brachioradialis, wrist extensors and intrinsic muscles of the hand.    -5/5 in bilateral hip flexors, quadriceps, hamstrings, gastrocsoleus complex, anterior tibialis and extensor hallucis longus.       Deep Tendon Reflexes:  -Right              Biceps: 2+         Triceps: 2+      Brachioradialis: 2+              Patella: 2+       Ankle: 2+           -Left              Biceps: 2+         Triceps: 2+      Brachioradialis: 2+              Patella: 2+       Ankle: 2+             Tone (Modified Dandre Scale):  No appreciable increase in tone or rigidity noted.     Sensory:  -Intact to light touch, pinprick BUE (C5-T1) and BLE (L2-S1).     Coordination:  -Finger to nose intact BUEs  -Heel to shin intact BLEs  -No ataxia     Gait  -No signs of ataxia  -ambulates unassisted       Results Review:    I reviewed the patient's new clinical results and findings.      No components found for: A1C  Lab Results   Component Value Date    HDL 55 05/12/2016     (H) 05/12/2016     No components found for: B12  Lab  Results   Component Value Date    TSH 0.86 05/12/2016       Assessment/Plan     Impression:  1.  Complicated migraine      Plan:  1.  I have reviewed the clinical and previous radiographic and physical exam findings with the patient at great length.  It is my opinion given the rapid resolution of symptoms the patient did, in fact, not have Bell's palsy or a TIA, but rather, has symptoms and presentation more consistent with an episodic, complicated migraine.    2.  At this time, as these occur so infrequently, I recommend that she not be on any sort of long-term, prophylactic medication, but rather, I did provide samples of Nurtec ODT 75 mg daily.  I have explained to her that she does not, in fact, have to have headache to be experiencing migraine symptoms and, therefore, if she were to have a sudden onset of symptoms similar to those described above, she can try this medication to see if it is abortive.  This should be able to provide some resolution of symptoms within the first 2 hours after taking medication.  If it is successful, she will contact me and let me know at which time I can refill the medication further.  However, the symptoms are very sporadic and few and far in between.    3.  Continue recommend secondary risk factor modification such as hypertension and dyslipidemia.  I will defer that to primary care.    From a neurology standpoint, I believe the patient can be seen on an as have needed basis.  Certainly, she has further difficulties as those due to above, and is instructed her to contact me for further evaluation at which time I am more than happy to assist in any manner possible.    Greater than 25 minutes of a 30-minute encounter spent in direct face-to-face education and present counseling regarding aforementioned measures, neuroimaging, physical exam findings, history, medications, side effects, risk factor modification and the bearing compliance of the prescribed regimen has on her overall  health status and outcomes.          Tai Mcgovern PA-C  09/11/20  13:22

## 2020-11-09 ENCOUNTER — HOSPITAL ENCOUNTER (EMERGENCY)
Facility: HOSPITAL | Age: 65
Discharge: HOME | End: 2020-11-09
Admitting: EMERGENCY MEDICINE

## 2020-11-09 VITALS
TEMPERATURE: 98.2 F | HEIGHT: 65 IN | DIASTOLIC BLOOD PRESSURE: 81 MMHG | WEIGHT: 172 LBS | OXYGEN SATURATION: 98 % | SYSTOLIC BLOOD PRESSURE: 148 MMHG | RESPIRATION RATE: 16 BRPM | HEART RATE: 88 BPM | BODY MASS INDEX: 28.66 KG/M2

## 2020-11-09 DIAGNOSIS — S62.610B OPEN DISPLACED FRACTURE OF PROXIMAL PHALANX OF RIGHT INDEX FINGER, INITIAL ENCOUNTER: Primary | ICD-10-CM

## 2020-11-09 PROCEDURE — 96372 THER/PROPH/DIAG INJ SC/IM: CPT

## 2020-11-09 PROCEDURE — 25010000003 LIDOCAINE 1 % SOLUTION 20 ML VIAL: Performed by: EMERGENCY MEDICINE

## 2020-11-09 PROCEDURE — 99283 EMERGENCY DEPT VISIT LOW MDM: CPT

## 2020-11-09 PROCEDURE — 25010000002 CEFTRIAXONE PER 250 MG: Performed by: EMERGENCY MEDICINE

## 2020-11-09 RX ORDER — DOXYCYCLINE 100 MG/1
100 CAPSULE ORAL 2 TIMES DAILY
Qty: 14 CAPSULE | Refills: 0 | Status: SHIPPED | OUTPATIENT
Start: 2020-11-09 | End: 2020-11-16

## 2020-11-09 RX ORDER — HYDROCODONE BITARTRATE AND ACETAMINOPHEN 5; 325 MG/1; MG/1
1 TABLET ORAL EVERY 6 HOURS PRN
Qty: 8 TABLET | Refills: 0 | Status: SHIPPED | OUTPATIENT
Start: 2020-11-09 | End: 2020-11-11

## 2020-11-09 RX ADMIN — LIDOCAINE HYDROCHLORIDE 1 G: 10 INJECTION, SOLUTION INFILTRATION; PERINEURAL at 20:24

## 2020-11-10 NOTE — ED PROVIDER NOTES
Subjective   Patient is a 65-year-old female who presents here today with complaint of previous injury to her right index finger.  The patient states that on Saturday she was taking the big mini top-down for her boat when the top fell and landed on her right index finger.  She sustained a laceration to the dorsal aspect of the finger.  She was seen at [facility] ER where they repaired the laceration.  The patient states that she was sent home with Keflex.  She was given a tetanus vaccination.  The patient states that her daughter who is in the medical field as well as her son-in-law were concerned that they did not do an x-ray so she contacted her primary care provider today.  The patient had an x-ray performed at [facility] which showed a mid May to moderately displaced and comminuted fracture of the proximal phalanx of the right index finger of the right hand.  The patient's primary care provider recommended that she come to the ER for further evaluation.  Patient states that the pain and swelling her hand is actually better than what it was on Saturday.  She states that her finger is somewhat turning purple however and that concerned her.  She denies any loss of feeling to the finger.  She states that she is able to move the finger however it just causes her pain to flex or extend the finger.  She states that the sutures have remained intact.  She states that she has not noted any discharge from the laceration site.  She presents ER today for further evaluation.      History provided by:  Patient   used: No    Hand Pain  Location:  Rt index finger  Quality:  Laceration on Sat, repaired, new fracture  Severity:  Mild  Onset quality:  Sudden  Duration:  2 days  Timing:  Constant  Progression:  Unchanged  Chronicity:  New  Associated symptoms: rash    Associated symptoms: no abdominal pain, no chest pain, no congestion, no cough, no diarrhea, no ear pain, no fatigue, no fever, no headaches, no  loss of consciousness, no myalgias, no nausea, no rhinorrhea, no shortness of breath, no sore throat, no vomiting and no wheezing        Review of Systems   Constitutional: Negative for fatigue and fever.   HENT: Negative for congestion, ear pain, rhinorrhea and sore throat.    Respiratory: Negative for cough, shortness of breath and wheezing.    Cardiovascular: Negative for chest pain.   Gastrointestinal: Negative for abdominal pain, diarrhea, nausea and vomiting.   Musculoskeletal: Negative for myalgias.   Skin: Positive for rash and wound.   Neurological: Negative for loss of consciousness and headaches.   All other systems reviewed and are negative.      Past Medical History:   Diagnosis Date   • Depression    • Diverticulitis    • H/O Bell's palsy     Right   • Hiatal hernia    • Hx of colonic polyp    • Neuromuscular disorder (CMS/HCC)     Neuropathy   • PONV (postoperative nausea and vomiting)    • Sleep apnea    • TIA (transient ischemic attack)        Allergies   Allergen Reactions   • Prochlorperazine GI Intolerance       Past Surgical History:   Procedure Laterality Date   • APPENDECTOMY     • CARDIAC CATHETERIZATION     • CHOLECYSTECTOMY     • COLONOSCOPY  12/10/2012    diverticula of sigmoid, tubular adenoma   • COLONOSCOPY N/A 2/24/2017    Tubular adenoma ascending colon, Diverticulosis repeat exam in 5 years   • COLONOSCOPY N/A 5/7/2019    Procedure: COLONOSCOPY WITH ANESTHESIA;  Surgeon: Dom Parker MD;  Location: [facility] ENDOSCOPY;  Service: Gastroenterology   • ELBOW OPEN REDUCTION INTERNAL FIXATION Left    • ENDOSCOPY  08/08/2016    HH, [place] Grade B reflux esophagitis   • HYSTERECTOMY     • UPPER GASTROINTESTINAL ENDOSCOPY  08/08/2016    hiatla hernia, [place] Grade B reflux esophagitis       Family History   Problem Relation Age of Onset   • Diabetes Mother    • Hypertension Mother    • Cancer Father    • Heart disease Sister    • Cancer Brother    • Colon polyps Brother    • Colon cancer Neg Hx         Social History     Socioeconomic History   • Marital status:      Spouse name: Not on file   • Number of children: Not on file   • Years of education: Not on file   • Highest education level: Not on file   Tobacco Use   • Smoking status: Never Smoker   • Smokeless tobacco: Never Used   Substance and Sexual Activity   • Alcohol use: Yes     Comment: rarely   • Drug use: No   • Sexual activity: Defer           Objective   Physical Exam  Vitals signs and nursing note reviewed.   Constitutional:       Appearance: Normal appearance.   HENT:      Head: Normocephalic and atraumatic.   Cardiovascular:      Rate and Rhythm: Normal rate.   Pulmonary:      Effort: Pulmonary effort is normal.   Musculoskeletal:      Comments: Laceration to proximal dorsal aspect of rt index finger, slight swelling noted, no drainage noted, no erythema noted, able to bend finger with some pain, no evidence of tenosynovitis, able to full extend finger, bruising noted to finger, likely the cause of the patient reporting her fingers are turning purple.  Patient has good cap refill, less than 2, positive CMS, neurovascularly intact.   Skin:     General: Skin is warm and dry.      Capillary Refill: Capillary refill takes less than 2 seconds.   Neurological:      Mental Status: She is alert.   Psychiatric:         Mood and Affect: Mood normal.         Procedures           ED Course  ED Course as of Nov 10 0144   Mon Nov 09, 2020   2016 Patient seen by myself as well. Nearly 72 hours ago she got her finger smashed into a heavy top and was seen at local ed. The hand and finger at that time per the patietn were very swollen but no xray was done. She feels they did wash the wound out and then it was sutured. She followed up with her PMD who did do an xray noting a fracture. She was placed on keflex. She actually has had no issues with the injury but states her family (who are health care providers) urged her to get the xray and then to come  to the ed when it showed a fracture. She denies any fevers, drainage from the wound, chills, numbness or inability to move the finger. She notes the swelling has gone down.     []   2019 She has no Kaval's signs, She is able to flex the finger without issue and extend as well, no pain at the flexer tendon. She has tenderness to the entire finger, no drainage from the wound itself. The sutures look good and the site looks good. No streaking, no crepitance.     The finger itself is bruised but she notes this is very improved from previous. She has normal cap refill.     []   Tue Nov 10, 2020   0143 I have discussed the patient's case with Dr. Haas, orthopedist on call.  He will see the patient tomorrow at 1230 in his office.  I have advised the patient to stop the Keflex and start doxycycline.  Patient is given a short course of Norco for pain.  We will place the patient in aluminum finger splint.  The patient will be discharged home at this time stable condition advised return to the ER for any new or worsening symptoms.    [LF]      ED Course User Index  [JH] Aden Kebede MD  [LF] Domonique Lomeli, JONEL                                   No orders to display     Labs Reviewed - No data to display          MDM  Number of Diagnoses or Management Options  Open displaced fracture of proximal phalanx of right index finger, initial encounter: new and requires workup     Amount and/or Complexity of Data Reviewed  Tests in the radiology section of CPT®: ordered and reviewed    Patient Progress  Patient progress: stable      Final diagnoses:   Open displaced fracture of proximal phalanx of right index finger, initial encounter            Domonique Lomeli APRN  11/10/20 0144

## 2021-01-22 ENCOUNTER — TELEPHONE (OUTPATIENT)
Dept: NEUROLOGY | Age: 66
End: 2021-01-22

## 2021-01-22 NOTE — TELEPHONE ENCOUNTER
Called patient about am appointment with Dr Khoa Zhou, could not reach patient by phone , left message on patient voice mail about the appointment with Dr Khoa Zhou.

## 2021-02-17 ENCOUNTER — TELEPHONE (OUTPATIENT)
Dept: VASCULAR SURGERY | Facility: CLINIC | Age: 66
End: 2021-02-17

## 2021-02-17 NOTE — TELEPHONE ENCOUNTER
Spoke with Mrs Oconnor reminding her of her appointments for Thursday, February 18th, 2021. Reminded [facility]con[facility]30 am for 8 o'clock testing and follow up afterwards at 9 am with Dr Treviño. Mrs Oconnor confirmed she would be here.

## 2021-02-18 ENCOUNTER — APPOINTMENT (OUTPATIENT)
Dept: ULTRASOUND IMAGING | Facility: HOSPITAL | Age: 66
End: 2021-02-18

## 2021-03-03 ENCOUNTER — TELEPHONE (OUTPATIENT)
Dept: VASCULAR SURGERY | Facility: CLINIC | Age: 66
End: 2021-03-03

## 2021-03-03 NOTE — TELEPHONE ENCOUNTER
Left message reminding Ms Oconnor of her appointments for Thursday, March 4th, 2021. Reminded Ms Oconnor to [facility] at 630 am for 7 o'clock testing and follow up afterwards at 815 am with Dr Treviño. Also advised Ms Oconnor if she had any questions concerns or needs to reschedule to please call the office at [phone].

## 2021-03-04 ENCOUNTER — OFFICE VISIT (OUTPATIENT)
Dept: VASCULAR SURGERY | Facility: CLINIC | Age: 66
End: 2021-03-04

## 2021-03-04 ENCOUNTER — HOSPITAL ENCOUNTER (OUTPATIENT)
Dept: ULTRASOUND IMAGING | Facility: HOSPITAL | Age: 66
Discharge: HOME | End: 2021-03-04
Admitting: SURGERY

## 2021-03-04 VITALS
BODY MASS INDEX: 29.49 KG/M2 | DIASTOLIC BLOOD PRESSURE: 80 MMHG | SYSTOLIC BLOOD PRESSURE: 122 MMHG | WEIGHT: 177 LBS | HEIGHT: 65 IN

## 2021-03-04 DIAGNOSIS — I10 ESSENTIAL HYPERTENSION: ICD-10-CM

## 2021-03-04 DIAGNOSIS — I65.23 BILATERAL CAROTID ARTERY STENOSIS: ICD-10-CM

## 2021-03-04 DIAGNOSIS — E78.5 DYSLIPIDEMIA: ICD-10-CM

## 2021-03-04 DIAGNOSIS — I65.23 BILATERAL CAROTID ARTERY STENOSIS: Primary | ICD-10-CM

## 2021-03-04 PROCEDURE — 99213 OFFICE O/P EST LOW 20 MIN: CPT | Performed by: SURGERY

## 2021-03-04 PROCEDURE — 93880 EXTRACRANIAL BILAT STUDY: CPT

## 2021-03-04 PROCEDURE — 93880 EXTRACRANIAL BILAT STUDY: CPT | Performed by: SURGERY

## 2021-03-04 RX ORDER — CHOLECALCIFEROL (VITAMIN D3) 125 MCG
500 CAPSULE ORAL DAILY
COMMUNITY

## 2021-03-04 RX ORDER — MELATONIN
1000 DAILY
COMMUNITY

## 2021-03-04 NOTE — PROGRESS NOTES
"3/4/2021       Collette Xiong,   [address]  [place] [address]    Edie Oconnor  1955    Chief Complaint   Patient presents with   • Follow-up     1 Year Follow Up For Bilateral Carotid Artery Stenosis. Test [id number] US pad carotid bilateral. Patient denies any stroke like symptoms.    • Non Smoker     Patient is a Non Smoker    • Med Management     Verbally verified medications with patient        Dear Collette Xiong DO       HPI  I had the pleasure of seeing your patient Edie Oconnor in the office today.  As you recall, Edie Oconnor is a 66 y.o.  female who we are following for asymptomatic carotid stenosis.  Currently she is doing well denies any strokelike symptoms.  She is maintained on full dose aspirin.  She denies any claudication to her lower extremities but does report cramping at night when she sleeps with leg swelling.   She does have a history of TIA.  She did have noninvasive testing performed, which I did review in office.      Review of Systems   Constitutional: Negative.    HENT: Negative.    Eyes: Negative.    Respiratory: Negative.    Cardiovascular: Positive for leg swelling.        Leg cramping   Gastrointestinal: Negative.    Endocrine: Negative.    Genitourinary: Negative.    Musculoskeletal: Negative.    Skin: Negative.    Allergic/Immunologic: Negative.    Neurological: Negative.    Hematological: Negative.    Psychiatric/Behavioral: Negative.         /80 (BP Location: Right arm, Patient Position: Sitting, Cuff Size: Adult)   Ht 165.1 cm (65\")   Wt 80.3 kg (177 lb)   BMI 29.45 kg/m²   Physical Exam  Vitals signs and nursing note reviewed.   Constitutional:       General: She is not in acute distress.     Appearance: Normal appearance. She is well-developed and normal weight. She is not diaphoretic.   HENT:      Head: Normocephalic and atraumatic.   Eyes:      General: No scleral icterus.     Pupils: Pupils are equal, round, and reactive " to light.   Neck:      Musculoskeletal: Normal range of motion and neck supple.      Thyroid: No thyromegaly.      Vascular: No carotid bruit or JVD.   Cardiovascular:      Rate and Rhythm: Normal rate and regular rhythm.      Pulses: Normal pulses.      Heart sounds: Normal heart sounds and S2 normal. No murmur. No friction rub. No gallop.    Pulmonary:      Effort: Pulmonary effort is normal.      Breath sounds: Normal breath sounds.   Abdominal:      General: Bowel sounds are normal.      Palpations: Abdomen is soft.   Musculoskeletal: Normal range of motion.      Right lower leg: Edema (mild) present.      Left lower leg: Edema (mild) present.   Skin:     General: Skin is warm and dry.   Neurological:      General: No focal deficit present.      Mental Status: She is alert and oriented to person, place, and time.      Cranial Nerves: No cranial nerve deficit.   Psychiatric:         Mood and Affect: Mood normal.         Behavior: Behavior normal.         Thought Content: Thought content normal.         Judgment: Judgment normal.        Diagnostic Data:  noninvasive testing including a carotid duplex shows less than 50% carotid stenosis bilaterally with bilateral antegrade vertebral flow.     Lifestyle   Physical activity   • Days per week: Not on file   • Minutes per session: Not on file       No results found.    Patient Active Problem List   Diagnosis   • Transient cerebral ischemia   • LAN on CPAP   • Dyslipidemia   • Essential hypertension   • Abnormal CT of the abdomen   • Colitis   • Nonsmoker   • Obesity, unspecified obesity severity, unspecified obesity type         ICD-10-CM ICD-9-CM   1. Bilateral carotid artery stenosis  I65.23 433.10     433.30   2. Essential hypertension  I10 401.9   3. Dyslipidemia  E78.5 272.4       Plan: After thoroughly evaluating Edie Oconnor, I believe the best course of action is to remain conservative from vascular surgery standpoint.  Currently she is doing well denies  any strokelike symptoms.  Did review her testing which shows less than 50% carotid stenosis bilaterally.  She does have complaints of some leg swelling which is giving her some heaviness tiredness and cramping.  I did give her a prescription for compression stockings in the 20 to 30 mmHg and instructed her on how to wear these on a daily basis.  This should help her cramping.  We will see her back in 1 year with repeat noninvasive testing for continued surveillance, including a carotid duplex.  I did discuss vascular risk factors as they pertain to vascular disease in controlling her hypertension and dyslipidemia.  Her blood pressure is stable on her current medication regimen. Patient's Body mass index is 29.45 kg/m². BMI is above normal parameters. Recommendations include: educational material.   The patient can continue taking their current medication regimen as previously planned.  This was all discussed in full with complete understanding.    Thank you for allowing me to participate in the care of your patient.  Please do not hesitate with any questions or concerns.  I will keep you aware of any further encounters with Edie Oconnor.        Sincerely yours,         JONEL Mclean

## 2021-03-08 ENCOUNTER — OFFICE VISIT (OUTPATIENT)
Dept: NEUROLOGY | Age: 66
End: 2021-03-08
Payer: MEDICARE

## 2021-03-08 VITALS
WEIGHT: 175 LBS | DIASTOLIC BLOOD PRESSURE: 71 MMHG | HEIGHT: 65 IN | SYSTOLIC BLOOD PRESSURE: 117 MMHG | RESPIRATION RATE: 16 BRPM | HEART RATE: 63 BPM | BODY MASS INDEX: 29.16 KG/M2

## 2021-03-08 DIAGNOSIS — G47.33 OBSTRUCTIVE SLEEP APNEA: Primary | ICD-10-CM

## 2021-03-08 DIAGNOSIS — G45.9 TIA (TRANSIENT ISCHEMIC ATTACK): ICD-10-CM

## 2021-03-08 PROCEDURE — 1090F PRES/ABSN URINE INCON ASSESS: CPT | Performed by: PSYCHIATRY & NEUROLOGY

## 2021-03-08 PROCEDURE — 3017F COLORECTAL CA SCREEN DOC REV: CPT | Performed by: PSYCHIATRY & NEUROLOGY

## 2021-03-08 PROCEDURE — G8417 CALC BMI ABV UP PARAM F/U: HCPCS | Performed by: PSYCHIATRY & NEUROLOGY

## 2021-03-08 PROCEDURE — 4040F PNEUMOC VAC/ADMIN/RCVD: CPT | Performed by: PSYCHIATRY & NEUROLOGY

## 2021-03-08 PROCEDURE — G8427 DOCREV CUR MEDS BY ELIG CLIN: HCPCS | Performed by: PSYCHIATRY & NEUROLOGY

## 2021-03-08 PROCEDURE — 99214 OFFICE O/P EST MOD 30 MIN: CPT | Performed by: PSYCHIATRY & NEUROLOGY

## 2021-03-08 PROCEDURE — 1123F ACP DISCUSS/DSCN MKR DOCD: CPT | Performed by: PSYCHIATRY & NEUROLOGY

## 2021-03-08 PROCEDURE — 1036F TOBACCO NON-USER: CPT | Performed by: PSYCHIATRY & NEUROLOGY

## 2021-03-08 PROCEDURE — G8484 FLU IMMUNIZE NO ADMIN: HCPCS | Performed by: PSYCHIATRY & NEUROLOGY

## 2021-03-08 PROCEDURE — G8400 PT W/DXA NO RESULTS DOC: HCPCS | Performed by: PSYCHIATRY & NEUROLOGY

## 2021-03-08 RX ORDER — VIT C/B6/B5/MAGNESIUM/HERB 173 50-5-6-5MG
CAPSULE ORAL DAILY
COMMUNITY
End: 2021-10-19 | Stop reason: ALTCHOICE

## 2021-03-08 NOTE — PATIENT INSTRUCTIONS
INSTRUCTIONS:  1. Do not take NSAIDs like Celebrex more than 2 days a week. 2 Continue taking aspirin 325 mg a day. 3. Take 2 capsules of fish oil, 2 capsules of flax seed oil, or 1 capsule of Krill oil.

## 2021-03-08 NOTE — PROGRESS NOTES
Respiratory: negative for - cough, hemoptysis, and shortness of breath  Cardiovascular: negative for - chest pain and palpitations  Gastrointestinal: negative for - blood in stools, constipation, diarrhea, nausea, and vomiting  Genito-Urinary: negative for - hematuria, urinary frequency, urinary urgency, and urinary retention  Musculoskeletal: negative for - joint pain, joint stiffness, and joint swelling  Hematological and Lymphatic: negative for - bleeding problems, abnormal bruising, and swollen lymph nodes  Endocrine:  negative for - polydipsia and polyphagia  Allergy/Immunology:  negative for - rhinorrhea, sinus congestion, hives  Integument:  negative for - negative for - rash, change in moles, new or changing lesions  Psychological: negative for - anxiety and depression  Neurological: negative for - memory loss, numbness/tingling, and weakness     PHYSICAL EXAMINATION:  Vitals:  /71   Pulse 63   Resp 16   Ht 5' 5\" (1.651 m)   Wt 175 lb (79.4 kg)   BMI 29.12 kg/m²   General appearance:  Alert, well developed, well nourished, in no distress  HEENT:  normocephalic, atraumatic, sclera appear normal, no nasal abnormalities, no rhinorrhea, Ears appear normal, oral mucous membranes are moist without erythema, trachea midline, thyroid is normal, no lymphadenopathy or neck mass. Cardiovascular:  Regular rate and rhythm without murmer. No peripheral edema, No cyanosis or clubbing. No carotid bruits. Pulmonary:  Lungs are clear to auscultation. Breathing appears normal, good expansion, normal effort without use of accessory muscles  Musculoskeletal:  Joints are normal.  No splints, slings, or casts. Spine appears normal with normal posture and range of motion. Integument:  No rash, erythema, or pallor  Psychiatric:  Mood, affect, and behavior appear normal      NEUROLOGIC EXAMINATION:  Mental Status:  alert, oriented to person, place, and time.   Speech:  Clear without dysarthria or dysphonia  Language: Fluent without aphasia  Cranial Nerves:   II Visual fields are full to confrontation   III,IV, VI Extraocular movements are full   VII Facial movements are symmetrical without weakness   VIII Hearing is intact   IX,X Shoulder shrug and head rotation strength are intact   XII No tongue atrophy or fasciculations. Normal tongue protrusion. No tongue weakness  Motor:  Normal strength in both upper and lower extremities. Normal muscle tone and bulk. Deep tendon reflexes are intact and symmetrical in both upper and lower extremities. Lopez's signs are absent bilaterally. There is no ankle clonus on either side. Sensation:  Sensation is intact to light touch, temperature, and vibration in all extremities. Coordination:  Rapid alternating movements are normal in both upper and lower extremities. Finger to nose testing is unimpaired bilaterally. Gait:  Normal station and gait. Pertinent Diagnostic Studies:  No download available. Result Impression   1.  No acute intracranial abnormalities. 2.  Mild chronic microvascular changes. 3.  Remote right cingulate gyrus lacunar infarct versus more focal area  of chronic microvascular changes. This report was finalized on 07/01/2020 10:24 by Dr. Britany Benson MD.   Result Narrative   EXAM: MR BRAIN WITHOUT AND WITH IV CONTRAST 7/1/2020    COMPARISON: MRI brain dated 3/14/2016     HISTORY: 76years-old Female. R47.89; R47.89-Other speech disturbances;  R20.2-Paresthesia of skin    TECHNIQUE:   Routine pulse sequences were obtained of the brain before and after the  administration of IV contrast.     REPORT:   The ventricles and cerebrospinal fluid spaces are normal in size and  configuration for the patient's age. There is no evidence of mass-effect  or midline shift. No reduced diffusivity is demonstrated. Mild chronic  microvascular changes.  A more linear focus of gliosis in the T2/flair  sequence (image 20, series 401) in the posterior cingulate gyrus may reflect a remote lacunar infarct versus a more focal area of chronic  microvascular changes. No abnormally enhancing lesions are  identified. The brainstem, sella, pituitary, cerebellum are unremarkable. The basal cisterns are preserved. No acute osseous lesion. Thinning of the lenses, reflecting cataract  versus prior cataract surgery. The flow voids are preserved. Dural sinuses are patent. Mastoid air cells are clear. The paranasal sinuses are free of  obstructive mucosal disease. Result Impression   Impression:  1. There is less than 50% stenosis of the right internal carotid artery. 2. There is less than 50% stenosis of the left internal carotid artery. 3. Antegrade flow is demonstrated in bilateral vertebral arteries. Comments: Bilateral carotid vertebral arterial duplex scan was  performed. Grayscale imaging shows intimal thickening and calcified elements at the  carotid bifurcation. The right internal carotid artery peak systolic  velocity is 38.5 cm/sec. The end-diastolic velocity is 48.5 cm/sec. The  right ICA/CCA ratio is approximately 1.2 . These findings correlate with  less than 50% stenosis of the right internal carotid artery. Grayscale imaging shows intimal thickening and calcified elements at the  carotid bifurcation. The left internal carotid artery peak systolic  velocity is 080 cm/sec. The end-diastolic velocity is 06.1 cm/sec. The  left ICA/CCA ratio is approximately 1.8 . These findings correlate with  less than 50% stenosis of the left internal carotid artery. Antegrade flow is demonstrated in bilateral vertebral arteries. This report was finalized on 03/04/2021 12:52 by Dr. Quang Kuhn MD.       Assessment:       ICD-10-CM    1. Obstructive sleep apnea  G47.33    2. TIA (transient ischemic attack)  G45.9    I discussed the diagnosis, pathophysiology, symptoms, risks, prognosis, and treatment options of stroke and TIA with Marlena Berrios. Plan:   1. Do not take NSAIDs like Celebrex more than 2 days a week. 2 Continue taking aspirin 325 mg a day. 3. Take 2 capsules of fish oil, 2 capsules of flax seed oil, or 1 capsule of Krill oil. 4. Continue CPAP use during sleep  5.  FU in a year    Electronically signed by Bo Gates MD on 3/8/21

## 2021-03-11 ENCOUNTER — BULK ORDERING (OUTPATIENT)
Dept: CASE MANAGEMENT | Facility: OTHER | Age: 66
End: 2021-03-11

## 2021-03-11 DIAGNOSIS — Z23 IMMUNIZATION DUE: ICD-10-CM

## 2021-07-27 RX ORDER — PANTOPRAZOLE SODIUM 40 MG/1
40 TABLET, DELAYED RELEASE ORAL DAILY
Status: ON HOLD | COMMUNITY
End: 2021-09-02 | Stop reason: SDUPTHER

## 2021-07-27 RX ORDER — DIPHENOXYLATE HYDROCHLORIDE AND ATROPINE SULFATE 2.5; .025 MG/1; MG/1
1 TABLET ORAL 4 TIMES DAILY PRN
COMMUNITY

## 2021-08-11 ENCOUNTER — OFFICE VISIT (OUTPATIENT)
Dept: GASTROENTEROLOGY | Age: 66
End: 2021-08-11
Payer: MEDICARE

## 2021-08-11 VITALS
DIASTOLIC BLOOD PRESSURE: 78 MMHG | SYSTOLIC BLOOD PRESSURE: 122 MMHG | OXYGEN SATURATION: 97 % | BODY MASS INDEX: 28.56 KG/M2 | WEIGHT: 171.4 LBS | HEIGHT: 65 IN | HEART RATE: 72 BPM

## 2021-08-11 DIAGNOSIS — K92.1 BLACK STOOL: ICD-10-CM

## 2021-08-11 DIAGNOSIS — R13.10 DYSPHAGIA, UNSPECIFIED TYPE: ICD-10-CM

## 2021-08-11 DIAGNOSIS — R19.7 INTERMITTENT DIARRHEA: ICD-10-CM

## 2021-08-11 DIAGNOSIS — R10.13 EPIGASTRIC PAIN: Primary | ICD-10-CM

## 2021-08-11 DIAGNOSIS — K21.9 CHRONIC GERD: ICD-10-CM

## 2021-08-11 DIAGNOSIS — R10.9 ABDOMINAL CRAMPING: ICD-10-CM

## 2021-08-11 PROCEDURE — 1090F PRES/ABSN URINE INCON ASSESS: CPT | Performed by: NURSE PRACTITIONER

## 2021-08-11 PROCEDURE — G8427 DOCREV CUR MEDS BY ELIG CLIN: HCPCS | Performed by: NURSE PRACTITIONER

## 2021-08-11 PROCEDURE — 4040F PNEUMOC VAC/ADMIN/RCVD: CPT | Performed by: NURSE PRACTITIONER

## 2021-08-11 PROCEDURE — 3017F COLORECTAL CA SCREEN DOC REV: CPT | Performed by: NURSE PRACTITIONER

## 2021-08-11 PROCEDURE — 99204 OFFICE O/P NEW MOD 45 MIN: CPT | Performed by: NURSE PRACTITIONER

## 2021-08-11 PROCEDURE — G8400 PT W/DXA NO RESULTS DOC: HCPCS | Performed by: NURSE PRACTITIONER

## 2021-08-11 PROCEDURE — 1123F ACP DISCUSS/DSCN MKR DOCD: CPT | Performed by: NURSE PRACTITIONER

## 2021-08-11 PROCEDURE — 1036F TOBACCO NON-USER: CPT | Performed by: NURSE PRACTITIONER

## 2021-08-11 PROCEDURE — G8417 CALC BMI ABV UP PARAM F/U: HCPCS | Performed by: NURSE PRACTITIONER

## 2021-08-11 ASSESSMENT — ENCOUNTER SYMPTOMS
BLOOD IN STOOL: 1
CONSTIPATION: 0
VOMITING: 0
RECTAL PAIN: 0
DIARRHEA: 1
SHORTNESS OF BREATH: 0
ABDOMINAL DISTENTION: 0
NAUSEA: 1
ANAL BLEEDING: 0
ABDOMINAL PAIN: 1
TROUBLE SWALLOWING: 1
CHOKING: 0
COUGH: 0

## 2021-08-11 NOTE — PATIENT INSTRUCTIONS
Schedule colonoscopy and endoscopy. Do not eat or drink after midnight the day of the procedure. Allowed medications can be taken with a small sip of water. Please review your prep instructions for allowed medications. You will not be able to drive for 24 hours after the procedure due to sedation. Must have a responsible adult, 18 years or older, accompany you to drive you home the day of your procedure. If you are on blood thinners, clearance from the prescribing physician will be obtained before your procedure is scheduled. If it is determined it is not safe to hold these medications for a short time an alternative procedure for evaluation may be recommended. No aspirin, ibuprofen, naproxen, fish oil or vitamin E for 5 days before procedure. Risks of colonoscopy and endoscopy include, but are not limited to, perforation, bleeding, and infection, Risk of perforation and bleeding are increased if there is a polyp removed or dilation completed. Anesthesia risks will be reviewed with you before the procedure by a member of the anesthesia department. Your physician may also schedule a follow up appointment with the nurse practitioner to discuss pathology, symptoms or to check if you have had any problems related to your procedure. If you prefer not to return to the office after your procedure please discuss this with your physician on the day of your colonoscopy. The physician will talk with you and/or your family after the procedure is completed. Final recommendations are based on the pathologist report if biopsies or specimens are taken. If polyps are removed during the procedure they will be sent to a pathologist for analysis. Unless you have a follow up appointment scheduled, you will be notified by mail of the pathology results within 4 weeks. If you have not received results after 4 weeks you may call the office to obtain this information. For Colonoscopy:   You will be given specific directions regarding restrictions to diet and bowel prep instructions including laxatives. Please read these instructions one week prior to your scheduled procedure to ensure that you are prepared. If you have any questions regarding these instructions please call our office Mon through Fri from 8:00 am to 4:00 pm.     Follow prep instructions provided for bowel prep. Take all of the bowel prep as directed. If you are having problems with nausea, stop your prep for 30-45 min to allow the nausea to subside before resuming your prep. It is important to drink plenty of fluids throughout the day before taking your laxatives. This will help to protect your kidneys, prevent dehydration and maximize the effect of the bowel prep. Your diet before a colonoscopy bowel preparation is very important to ensure a successful colon exam. It is recommended to consider certain changes to your diet three to four days prior to the procedure. Remember that your bowels need to be completely empty for the exam.    What foods are good to eat? Cut down on heavy solid foods three to four days before the procedure and start introducing lighter meals to your diet. The following food suggestions are a good part of your diet before a colonoscopy bowel preparation.  Light meat that is easily digestible such as chicken (without the skin)    Potatoes without skin    Cheese    Eggs    A light meal of steamed white fish    Light clear soups    Foods and drinks to avoid  Avoid foods that contain too much fiber. Stay clear of dark colored beverages. They can stick to the walls of the digestive tract and make it difficult to differentiate from blood.  Some of these foods are:   Red meat, rice, nuts and vegetables    Milk, other milk based fluids and cream    Most fruit and puddings    Whole grain pasta    Cereals, bran and seeds    Colored beverages, especially those that are red or purple in color    Red colored Jell-O   On the day before the colonoscopy, continue to drink plenty of clear fluids. It is important   to keep yourself hydrated before the exam.     Please follow all instructions as provided for cleansing the bowel. Failure to have an adequately prepped colon may cause you to have incomplete exam with further testing required.      [email or web address]

## 2021-08-11 NOTE — PROGRESS NOTES
Subjective:     Patient ID: Abiodun Blanc is a 78 y.o. female  PCP: James Croft DO  Referring Provider: Jhoana Berg,*    HPI  Patient presents to the office today with the following complaints: Abdominal Pain      Pt here with c/o abdominal pain. She states 3 weeks ago she began having abdominal pain and cramping. There were no aggravating factors. \"I would wake up with it and go to bed with it. \"  During this she had episodes of diarrhea and nausea. She states she had a few stools that were black. She also c/o epigastric soreness for years and chronic reflux. She c/o sensation of foods getting stuck when eating. She uses Aleve a few times a week for arthralgias. She uses PPI prn. Last EGD 2016 - HH, esophagitis  Last Colonoscopy 2017 - AP x 1, BCM, 5 year recall  Family hx colon cancer and colon polyps - Brother    Assessment:     1. Epigastric pain    2. Abdominal cramping    3. Black stool    4. Intermittent diarrhea    5. Dysphagia, unspecified type    6. Chronic GERD            Plan:   - Schedule colonoscopy and endoscopy  Instruct on bowel prep. Nothing to eat or drink after midnight the day of the exam.  Unable to drive for 24 hours after the procedure. No aspirin or nonsteroidal anti-inflammatories for 5 days before procedure. I have discussed the benefits, alternatives, and risks (including bleeding, perforation and death)  for pursuing Endoscopy (EGD/Colonscopy/EUS/ERCP) with the patient and they are willing to continue. We also discussed the need for anesthesia, IV access, proper dietary changes, medication changes if necessary, and need for bowel prep (if ordered) prior to their Endoscopic procedure. They are aware they must have someone accompany them to their scheduled procedure to drive them home - they agree to the above and are willing to continue. Orders  No orders of the defined types were placed in this encounter.     Medications  No orders of the defined types were placed in this encounter. Patient History:     Past Medical History:   Diagnosis Date    Bell's palsy     Carotid occlusion, left     Chronic back pain     Colon polyp     CPAP (continuous positive airway pressure) dependence     7cm to 15.8cm    Depression     Diverticulitis 2006    DM (diabetes mellitus) ([facility] [address]     GERD (gastroesophageal reflux disease)     H/O diverticulitis of colon     Hiatal hernia     Hyperlipidemia     Hypertension     Hypothyroid     Irritable bowel syndrome     Neuropathy     Obstructive sleep apnea     AHI:  35.3 per HST, 12/2016    Restless legs syndrome     Rheumatoid aortitis     Sleep apnea     c-pap    TIA (transient ischemic attack)     Dr Mar Ax       Past Surgical History:   Procedure Laterality Date    APPENDECTOMY     [place] [facility] COLONOSCOPY  12/10/2012    Dr Dionte Bowles of sigmoid, AP    COLONOSCOPY  02/24/2017    Dr Nickolas Perez x 1, BCM x 2, 5 yr recall    ELBOW SURGERY Left 03/26/2015    HYSTERECTOMY      total    UPPER GASTROINTESTINAL ENDOSCOPY  08/08/2016    Dr Irene Diego hernia, [place] Grade B reflux esophagitis       Family History   Problem Relation Age of Onset    Diabetes Mother     High Blood Pressure Mother     High Cholesterol Mother     Stroke Mother     Cancer Father         ?stomach     Other Sister         Blood CLots    Heart Disease Sister     Colon Cancer Brother     Pancreatic Cancer Brother     Colon Polyps Brother     Esophageal Cancer Neg Hx     Liver Disease Neg Hx     Stomach Cancer Neg Hx     Ulcerative Colitis Neg Hx        Social History     Socioeconomic History    Marital status:       Spouse name: None    Number of children: None    Years of education: None    Highest education level: None   Occupational History    None   Tobacco Use    Smoking status: Never Smoker    Smokeless tobacco: Never Used   Vaping Use    Vaping Use: Never used   Substance and Sexual Activity    Alcohol use: Yes     Alcohol/week: 1.0 standard drinks     Types: 1 Glasses of wine per week     Comment: occasional     Drug use: No    Sexual activity: None   Other Topics Concern    None   Social History Narrative    None     Social Determinants of Health     Financial Resource Strain:     Difficulty of Paying Living Expenses:    Food Insecurity:     Worried About Running Out of Food in the Last Year:     Ran Out of Food in the Last Year:    Transportation Needs:     Lack of Transportation (Medical):  Lack of Transportation (Non-Medical):    Physical Activity:     Days of Exercise per Week:     Minutes of Exercise per Session:    Stress:     Feeling of Stress :    Social Connections:     Frequency of Communication with Friends and Family:     Frequency of Social Gatherings with Friends and Family:     Attends Scientologist Services:     Active Member of Clubs or Organizations:     Attends Club or Organization Meetings:     Marital Status:    Intimate Partner Violence:     Fear of Current or Ex-Partner:     Emotionally Abused:     Physically Abused:     Sexually Abused:        Current Outpatient Medications   Medication Sig Dispense Refill    diphenoxylate-atropine (LOMOTIL) 2.5-0.025 MG per tablet Take 1 tablet by mouth 4 times daily as needed for Diarrhea.       pantoprazole (PROTONIX) 40 MG tablet Take 40 mg by mouth daily      vitamin D (CHOLECALCIFEROL) 25 MCG (1000 UT) TABS tablet Take 1,000 Units by mouth daily      Turmeric 500 MG CAPS Take by mouth daily       Multiple Vitamins-Minerals (THERA M PLUS) TABS Take 1 tablet by mouth daily      vitamin B-12 (CYANOCOBALAMIN) 100 MCG tablet Take 100 mcg by mouth daily      Zinc 30 MG CAPS Take 1 capsule by mouth daily      loperamide (IMODIUM) 2 MG capsule Take 2 mg by mouth as needed       dicyclomine (BENTYL) 20 MG tablet Take 20 mg by mouth every 6 hours as needed       in the epigastric area and periumbilical area. There is no guarding or rebound. Musculoskeletal:         General: Normal range of motion. Cervical back: Normal range of motion and neck supple. Skin:     General: Skin is warm and dry. Findings: No rash. Nails: There is no clubbing. Neurological:      Mental Status: She is alert and oriented to person, place, and time. Gait: Gait normal.   Psychiatric:         Behavior: Behavior normal.         Thought Content:  Thought content normal.

## 2021-08-19 ENCOUNTER — OFFICE VISIT (OUTPATIENT)
Dept: NEUROLOGY | Age: 66
End: 2021-08-19
Payer: MEDICARE

## 2021-08-19 VITALS
HEIGHT: 65 IN | DIASTOLIC BLOOD PRESSURE: 70 MMHG | TEMPERATURE: 97.6 F | SYSTOLIC BLOOD PRESSURE: 119 MMHG | BODY MASS INDEX: 28.49 KG/M2 | OXYGEN SATURATION: 98 % | WEIGHT: 171 LBS | HEART RATE: 63 BPM

## 2021-08-19 DIAGNOSIS — Z99.89 CPAP (CONTINUOUS POSITIVE AIRWAY PRESSURE) DEPENDENCE: ICD-10-CM

## 2021-08-19 DIAGNOSIS — G25.81 RESTLESS LEG SYNDROME: ICD-10-CM

## 2021-08-19 DIAGNOSIS — G47.33 OBSTRUCTIVE SLEEP APNEA: Primary | ICD-10-CM

## 2021-08-19 PROCEDURE — G8400 PT W/DXA NO RESULTS DOC: HCPCS | Performed by: PHYSICIAN ASSISTANT

## 2021-08-19 PROCEDURE — 1090F PRES/ABSN URINE INCON ASSESS: CPT | Performed by: PHYSICIAN ASSISTANT

## 2021-08-19 PROCEDURE — G8428 CUR MEDS NOT DOCUMENT: HCPCS | Performed by: PHYSICIAN ASSISTANT

## 2021-08-19 PROCEDURE — 4040F PNEUMOC VAC/ADMIN/RCVD: CPT | Performed by: PHYSICIAN ASSISTANT

## 2021-08-19 PROCEDURE — 3017F COLORECTAL CA SCREEN DOC REV: CPT | Performed by: PHYSICIAN ASSISTANT

## 2021-08-19 PROCEDURE — 1036F TOBACCO NON-USER: CPT | Performed by: PHYSICIAN ASSISTANT

## 2021-08-19 PROCEDURE — G8417 CALC BMI ABV UP PARAM F/U: HCPCS | Performed by: PHYSICIAN ASSISTANT

## 2021-08-19 PROCEDURE — 99214 OFFICE O/P EST MOD 30 MIN: CPT | Performed by: PHYSICIAN ASSISTANT

## 2021-08-19 PROCEDURE — 1123F ACP DISCUSS/DSCN MKR DOCD: CPT | Performed by: PHYSICIAN ASSISTANT

## 2021-08-19 NOTE — LETTER
[facility]  [address] [place] [address], [address] [place] [address] [address]  Medardo Eli  Phone [phone]           Re:  Ada Marvin    20  :  1955  Address: [address] [place] [address],[id number]       Replinishible PAP Supplies, 1 year supply  Item HPCPS Code Frequency   Mask of choice  or  1 per 3 months   Nasal Mask cushion/pillows  or  2 per 30 days   Full Face Mask Interface  1 per 30 days   Headgear  1 per 6 months   Tubing, length of choice  or  1 per 3 months   Water Chamber  1 per 6 months   Chinstrap  1 per 6 months   Disposable Filters  2 per 30 days   Reusable Filters  1 per 6 months     Diagnoses:  Obstructive sleep apnea (G47.33)  Length of Need: Lifetime, 99    Ordering Provider: Gail Talley PA-C  NPI:  [id number]        Signature:         Date: 2021      Electronically Signed by Gail Talley PA-C  on 2021 at 8:09 AM

## 2021-08-19 NOTE — PATIENT INSTRUCTIONS
Patient education: Sleep apnea in adults       INTRODUCTION  Normally during sleep, air moves through the throat and in and out of the lungs at a regular rhythm. In a person with sleep apnea, air movement is periodically diminished or stopped. There are two types of sleep apnea: obstructive sleep apnea and central sleep apnea. In obstructive sleep apnea, breathing is abnormal because of narrowing or closure of the throat. In central sleep apnea, breathing is abnormal because of a change in the breathing control and rhythm. Sleep apnea is a serious condition that can affect a person's ability to safely perform normal daily activities and can affect long term health. Approximately 25 percent of adults are at risk for sleep apnea of some degree. Men are more commonly affected than women. Other risk factors include middle and older age, being overweight or obese, and having a small mouth and throat. This topic review focuses on the most common type of sleep apnea in adults, obstructive sleep apnea (MELVIN). HOW SLEEP APNEA OCCURS  The throat is surrounded by muscles that control the airway for speaking, swallowing, and breathing. During sleep, these muscles are less active, and this causes the throat to narrow. In most people, this narrowing does not affect breathing. In others, it can cause snoring, sometimes with reduced or completely blocked airflow. A completely blocked airway without airflow is called an obstructive apnea. Partial obstruction with diminished airflow is called a hypopnea. A person may have apnea and hypopnea during sleep. Insufficient breathing due to apnea or hypopnea causes oxygen levels to fall and carbon dioxide to rise. Because the airway is blocked, breathing faster or harder does not help to improve oxygen levels until the airway is reopened. Typically, the obstruction requires the person to awaken to activate the upper airway muscles.  Once the airway is opened, the person then takes several deep breaths to catch up on breathing. As the person awakens, he or she may move briefly, snort or snore, and take a deep breath. Less frequently, a person may awaken completely with a sensation of gasping, smothering, or choking. If the person falls back to sleep quickly, he or she will not remember the event. Many people with sleep apnea are unaware of their abnormal breathing in sleep, and all patients underestimate how often their sleep is interrupted. Awakening from sleep causes sleep to be unrefreshing and causes fatigue and daytime sleepiness. Anatomic causes of obstructive sleep apnea   Most patients have MELVIN because of a small upper airway. As the bones of the face and skull develop, some people develop a small lower face, a small mouth, and a tongue that seems too large for the mouth. These features are genetically determined, which explains why MELVIN tends to cluster in families. Obesity is another major factor. Tonsil enlargement can be an important cause, especially in children. SLEEP APNEA SYMPTOMS  The main symptoms of MELVIN are loud snoring, fatigue, and daytime sleepiness. However, some people have no symptoms. For example, if the person does not have a bed partner, he or she may not be aware of the snoring. Fatigue and sleepiness have many causes and are often attributed to overwork and increasing age. As a result, a person may be slow to recognize that they have a problem. A bed partner or spouse often prompts the patient to seek medical care. Other symptoms may include one or more of the following:  ?Restless sleep  ? Awakening with choking, gasping, or smothering  ? Morning headaches, dry mouth, or sore throat  ? Waking frequently to urinate  ? Awakening unrested, groggy  ? Low energy, difficulty concentrating, memory impairment    Risk factors  Certain factors increase the risk of sleep apnea.   ?Increasing age [de-identified] MELVIN occurs at all ages, but it is more common in middle and older age adults. ?Male sex  MELVIN is two times more common in men, especially in middle age. ?Obesity  The more obese a person is, the more likely he or she is to have MELVIN. ? Sedation from medication or alcohol  This interferes with the ability to awaken from sleep and can lengthen periods of apnea (no breathing), with potentially dangerous consequences. ? Abnormality of the airway. SLEEP APNEA CONSEQUENCES  Complications of sleep apnea can include daytime sleepiness and difficulty concentrating. The consequence of this is an increased risk of accidents and errors in daily activities. Studies have shown that people with severe MELVIN are more than twice as likely to be involved in a motor vehicle accident as people without these conditions. People with MELVIN are encouraged to discuss options for driving, working, and performing other high-risk tasks with a healthcare provider. In addition, people with untreated MELVIN may have an increased risk of cardiovascular problems such as high blood pressure, heart attack, abnormal heart rhythms, or stroke. This risk may be due to changes in the heart rate and blood pressure that occur during sleep. SLEEP APNEA DIAGNOSIS  The diagnosis of MELVIN is best made by a knowledgeable sleep medicine specialist who has an understanding of the individual's health issues. The diagnosis is usually based upon the person's medical history, physical examination, and testing, including:  ? A complaint of snoring and ineffective sleep  ? Neck size (greater than 16 inches in men or 14 inches in women) is associated with an increased risk of sleep apnea  ? A small upper airway: difficulty seeing the throat because of a tongue that is large for the mouth  ? High blood pressure, especially if it is resistant to treatment  ? If a bed partner has observed the patient during episodes of stopped breathing (apnea), choking, or gasping during sleep, there is a strong possibility of sleep apnea.     Testing is pressure)  device uses an air-tight attachment to the nose, typically a mask, connected to a tube and a blower which generates the pressure. Devices that fit comfortably into the nasal opening, rather than over the nose, are also available. CPAP should be used any time the person sleeps (day or night). The CPAP device is usually used for the first time in the sleep lab, where a technician can adjust the pressure and select the best equipment to keep the airway open. Alternatively, an auto device with a self-adjusting pressure feature, provided with proper education and training, can get treatment started without another sleep test. While the treatment may seem uncomfortable, noisy, or bulky at first, most people accept the treatment after experiencing better sleep. However, difficulty with mask comfort and nasal congestion prevent up to 50 percent of people from using the treatment on a regular basis. Continued follow up with a healthcare provider helps to ensure that the treatment is effective and comfortable. Information from the CPAP machine is often used by physicians, therapists, and insurers to track the success of treatment. CPAP can be delivered with different features to improve comfort and solve problems that may come up during treatment. Changes in treatment may be needed if symptoms do not improve or if the persons condition changes, such as a gain or loss of weight. Adjust sleep position  Adjusting sleep position (to stay off the back) may help improve sleep quality in people who have MELVIN when sleeping on the back. However, this is difficult to maintain throughout the night and is rarely an adequate solution. Weight loss  Weight loss may be helpful for obese or overweight patients. Weight loss may be accomplished with dietary changes, exercise, and/or surgical treatment.  However, it can be difficult to maintain weight loss; the five-year success of non-surgical weight loss is only 5 percent, meaning that 95 percent of people regain lost weight. Avoid alcohol and other sedatives  Alcohol can worsen sleepiness, potentially increasing the risk of accidents or injury. People with MELVIN are often counseled to drink little to no alcohol, even during the daytime. Similarly, people who take anti-anxiety medications or sedatives to sleep should speak with their healthcare provider about the safety of these medications. People with MELVIN must notify all healthcare providers, including surgeons, about their condition and the potential risks of being sedated. People with MELVIN who are given anesthesia and/or pain medications require special management and close monitoring to reduce the risk of a blocked airway. Dental devices  A dental device, called an oral appliance or mandibular advancement device, can reposition the jaw (mandible), bringing the tongue and soft palate forward as well. This may relieve obstruction in some people. This treatment is excellent for reducing snoring, although the effect on MELVIN is sometimes more limited. As a result, dental devices are best used for mild cases of MELVIN when relief of snoring is the main goal. Failure to tolerate and accept CPAP is another indication for dental devices. While dental devices are not as effective as CPAP for MELVIN, some patients prefer a dental device to CPAP. Side effects of dental devices are generally minor but may include changes to the bite with prolonged use. Surgical treatment  Surgery is an alternative therapy for patients who cannot tolerate or do not improve with nonsurgical treatments such as CPAP or oral devices. Surgery can also be used in combination with other nonsurgical treatments. Surgical procedures reshape structures in the upper airways or surgically reposition bone or soft tissue. Uvulopalatopharyngoplasty (UPPP) removes the uvula and excessive tissue in the throat, including the tonsils, if present.  Other procedures, such as maxillomandibular advancement (MMA), address both the upper and lower pharyngeal airway more globally. UPPP alone has limited success rates (less than 50 percent) and people can relapse (when MELVIN symptoms return after surgery). As a result, this surgery is only recommended in a minority of people and should be considered with caution. MMA may have a higher success rate, particularly in people with abnormal jaw (maxilla and mandible) anatomy, but it is the most complicated procedure. A newer surgical approach, nerve stimulation to protrude the tongue, has promising success rates in very selected people. Tracheostomy creates a permanent opening in the neck. It is reserved for people with severe disease in whom less drastic measures have failed or are inappropriate. Although it is always successful in eliminating obstructive sleep apnea, tracheostomy requires significant lifestyle changes and carries some serious risks (eg, infection, bleeding, blockage). All surgical treatments require discussions about the goals of treatment, the expected outcomes, and potential complications. Hypoglossal nerve stimulator- \"Inspire\" device    PAP treatment failure:  Possible causes of treatment failure include nonadherence or suboptimal adherence, weight gain, an inappropriate level of prescribed positive pressure, or an additional disorder causing sleepiness (eg, narcolepsy) that may require alterations in the therapeutic regimen. A review of medications should also be undertaken since many drugs may lead to sleepiness. Inadequate sleep time may also negate the expected effects from treatment of MELVIN. Also, pt's can have persistent hypersomnolence associated with sleep apnea even in the presence of adequate therapy and at those times Provigil or Nuvigil or other stimulants may be indicated.     Once the patient's positive airway pressure therapy has been optimized and symptoms resolved, a regimen of long-term follow-up should be established. Annual visits are reasonable, with more frequent visits in between if new issues arise. The purpose of long-term follow-up is to assess usage and monitor for recurrent MELVIN, new side effects, air leakage, and fluctuations in body weight. WHERE TO GET MORE INFORMATION  Your healthcare provider is the best source of information for questions and concerns related to your medical problem. Organizations  American [employer]nea Association  Provides information about sleep apnea to the public, publishes a newsletter, and serves as an advocate for people with the disorder. Isaac, [address]   Nerva@[employer]. org   [employer].. org   Tel: [phone]   Fax: [facility] organization that works to [employer] and safety by promoting public understanding of sleep and sleep disorders. Supports sleep-related education, research, and advocacy; produces and distributes educational materials to the public and healthcare professionals; and offers postdoctoral fellowships and grants for sleep researchers. [address]   Walt@[employer]ank. org   [email or web address]. org   Tel: [phone]   Fax: [phone]    Important information:  Medicare/private insurance CPAP/BiPAP/APAP requirements:  Medicare/private insurance has specific requirements for PAP compliance that must be met during the first 90 days of use to continue coverage for CPAP/BiPAP/APAP  from day 91 and beyond. The policy requires that patients use a PAP device 4 hours per 24 hour period, at least 70% of the time over a 30 day period. This data must be downloaded as a report direct from the PAP devices. This is called a compliance download. Your PAP supplier will assist you in this matter.      Note:  Where applicable, we will utilize PAP device efficiency reports, additional testing, and face-to-face  clinical evaluation subsequent to any treatment, changes in treatment, and continued treatment. Caution:  Please abstain from driving or engaging in other activities which may be hazardous in the presence of diminished alertness or daytime drowsiness. And avoid the use of sedatives or alcohol, which can worsen sleep apnea and daytime drowsiness. Mask suggestions:  -     Resmed Airfit N20 (Nasal) or F20 (Full face mask). They conform to your face, thus decreasing the potential for mask leakage. You might like the AirTouch F20(full face mask). It has a \"memory foam\" like cushion. The AirFit F30 is a smaller style full face mask designed to sit low on and cover less of your face for fewer facial marks. AirFit N30i has a top of the head tube with a nasal mask. AirFit P10 reported to be the most comfortable nasal pillow mask. Resmed Mirage FX reported to be the most comfortable nasal mask. Resmed Mirage [place] reported to be the most comfortable hybrid mask. AirTouch N20-memory foam nasal mask. Respironics: You might also like to try a nasal mask called a Dreamwear nasal mask or the Dreamwear nasal pillow. Another suggestion is the [facility], it is a minimal contact full face mask. The Ilan finchdible under the nose design makes it the only full face mask that won't cause red marks on the bridge of your nose when compared to other full face masks. The Dreamwear full face mask has a  soft feel, unique in-frame air-flow, and innovative air tube connection at the top of the head for the ultimate in sleep comfort. Comfort Gel Blue. Dreamwear gel pillows. [employer]: Brevida nasal pillow mask and Simplus FFM    The use of a memory foam CPAP pillow supports the head and neck throughout the night.

## 2021-08-19 NOTE — PROGRESS NOTES
[facility] Sleep Follow Up Encounter      Information:   Patient Name: Jesus Elliott  :   1955  Age:   78 y.o. MRN:   [id number]  Account #:  [de-identified]  Today:                21    Provider:  Leonardo Duncan PA-C    Chief Complaint   Patient presents with    Sleep Apnea     follow up         Subjective:   Jesus Elliott is a 78 y.o. female  with a history of severe MELVIN and RLS who comes in for a sleep clinic follow up. The HST, 2016 revealed an AHI of 35.3.  She is prescribed auto CPAP therapy at a pressure of 7cm to 15.8cm. The compliance report indicates that she is averaging 7  hours of CPAP use per day. She reports that consistent CPAP use has alleviated the previous MELVIN symptoms. The RLS is stable.     Location or symptom:  MELVIN  Onset:  Repeat PS2016  Timing:  q hs  Severity:  Severe  Associated:  Snoring, witnessed apneas, and excessive daytime somnolence  Alleviated:  CPAP      Objective:     Past Medical History:   Diagnosis Date    Bell's palsy     Carotid occlusion, left     Chronic back pain     Colon polyp     CPAP (continuous positive airway pressure) dependence     7cm to 15.8cm    Depression     Diverticulitis     DM (diabetes mellitus) (HCC)     GERD (gastroesophageal reflux disease)     H/O diverticulitis of colon     Hiatal hernia     Hyperlipidemia     Hypertension     Hypothyroid     Irritable bowel syndrome     Neuropathy     Obstructive sleep apnea     AHI:  35.3 per HST, 2016    Restless legs syndrome     Rheumatoid aortitis     Sleep apnea     c-pap    TIA (transient ischemic attack)     Dr Daryl You       Past Surgical History:   Procedure Laterality Date    APPENDECTOMY      CARDIAC CATHETERIZATION      CHOLECYSTECTOMY      COLONOSCOPY  12/10/2012    Dr Lacey Phillips of sigmoid, [place]    COLONOSCOPY  2017    Dr Moisés Flowers x 1, BCM x 2, 5 yr recall    ELBOW SURGERY Left 2015    HYSTERECTOMY      total    UPPER Allergies: Other and Prochlorperazine    REVIEW OF SYSTEMS     Constitutional: []? Fever []? Sweats []? Chills []? Recent Injury   [x]? Denies all unless marked  HENT:[]? Headache  []? Head Injury  []? Sore Throat  []? Ear Pain  []? Dizziness []? Hearing Loss   [x]? Denies all unless marked  Musculoskeletal: []? Arthralgia  []? Myalgias []? Muscle cramps  []? Muscle twitches   [x]? Denies all unless marked   Spine:  []? Neck pain  []? Back pain  []? Sciaticia  [x]? Denies all unless marked  Neurological:[]? Visual Disturbance []? Double Vision []? Slurred Speech []? Trouble swallowing  []? Vertigo []? Tingling []? Numbness []? Weakness []? Loss of Balance   []? Loss of Consciousness []? Memory Loss []? Seizures  [x]? Denies all unless marked  Psychiatric/Behavioral:[]? Depression []? Anxiety  [x]? Denies all unless marked  Sleep: []? Insomnia []? Sleep Disturbance []? Snoring [x]? Restless Legs []? Daytime Sleepiness [x]? Sleep Apnea  []? Denies all unless marked    The MA has completed the ROS with the patient. I have reviewed it in its' entirety with the patient and agree with the documentation. PHYSICAL EXAM  /70 (Site: Left Upper Arm, Position: Sitting, Cuff Size: Medium Adult)   Pulse 63   Temp 97.6 °F (36.4 °C)   Ht 5' 5\" (1.651 m)   Wt 171 lb (77.6 kg)   SpO2 98%   Breastfeeding No   BMI 28.46 kg/m²      Constitutional  No acute distress    HEENT- Conjunctiva normal.  No scars, masses, or lesions over external nose or ears, no neck masses noted, no jugular vein distension, no bruit  Cardiac- Regular rate and rhythm  Pulmonary- Clear to auscultation, good expansion, normal effort without use of accessory muscles  Musculoskeletal  No significant wasting of muscles noted, no bony deformities  Extremities - No clubbing, cyanosis or edema  Skin  Warm, dry, and intact.   No rash, erythema, or pallor  Psychiatric  Mood, affect, and behavior appear normal      Neurologic:  Extraocular movements are intact without nystagmus. Visual fields are full to confrontation. Facial movements are symmetrical and normal.  Speech is precise. Extremity strength is normal in both uppers and lowers. Deep tendon reflexes are intact and symmetrical.  Rapid alternating movements are unimpaired. Finger-to-nose testing is performed well, without dysmetria. Gait is normal.    I reviewed the following studies:      []  :  Clinical laboratory test results    []  :  Radiology reports    [x]  :  Review and summarization of medical records and/or obtain medical records     []  :  Previous/recent polysomnogram report(s)    []  :  [place] Sleepiness Scale      [x]  :  Compliance download: The auto CPAP is set at a pressure of 7cm to 15.8cm. Compliance download shows that she uses device: 100% of the time;  percentage of days with usage >=4 hours: 100%. AHI: 2.0    Assessment:       ICD-10-CM    1. Obstructive sleep apnea  G47.33    2. Restless leg syndrome  G25.81    3. CPAP (continuous positive airway pressure) dependence  Z99.89           []  :  Stable     []  :  Improved                       [x]  :  Well controlled              []  :  Resolving     []  :  Resolved     []  :  Inadequately controlled     []  :  Worsening     []  :  Additional workup planned    Patient is compliant and benefiting from therapy as indicated by compliance evaluation and patient report. Plan:     No orders of the defined types were placed in this encounter. 1.   Patient advised of the etiology,  pathophysiology, diagnosis, treatment options, and risks of untreated MELVIN. Risks may include, but are not limited to  hypertension, coronary artery disease, diabetes, stroke, weight gain, impaired cognition, daytime somnolence,  and motor vehicle accidents. Advised to abstain from driving or operating heavy machinery when drowsy and the use of respiratory suppressants.   2.  The following educational material has been included in this visit after visit summary for your review: MELVIN/PAP guidelines-Discussed with the patient and all questions fully answered. 3.  Order-supplies-[facility]  4. Continue CPAP  5.   Follow up in 1 year

## 2021-08-30 ENCOUNTER — ANESTHESIA EVENT (OUTPATIENT)
Dept: OPERATING ROOM | Age: 66
End: 2021-08-30

## 2021-08-30 ENCOUNTER — OFFICE VISIT (OUTPATIENT)
Age: 66
End: 2021-08-30

## 2021-08-30 DIAGNOSIS — Z11.59 SCREENING FOR VIRAL DISEASE: Primary | ICD-10-CM

## 2021-08-30 LAB — SARS-COV-2, PCR: NOT DETECTED

## 2021-08-30 PROCEDURE — 99999 PR OFFICE/OUTPT VISIT,PROCEDURE ONLY: CPT | Performed by: NURSE PRACTITIONER

## 2021-09-02 ENCOUNTER — APPOINTMENT (OUTPATIENT)
Dept: OPERATING ROOM | Age: 66
End: 2021-09-02

## 2021-09-02 ENCOUNTER — HOSPITAL ENCOUNTER (OUTPATIENT)
Age: 66
Setting detail: OUTPATIENT SURGERY
Discharge: HOME | End: 2021-09-02
Attending: INTERNAL MEDICINE | Admitting: INTERNAL MEDICINE
Payer: MEDICARE

## 2021-09-02 ENCOUNTER — ANESTHESIA (OUTPATIENT)
Dept: OPERATING ROOM | Age: 66
End: 2021-09-02

## 2021-09-02 ENCOUNTER — HOSPITAL ENCOUNTER (OUTPATIENT)
Age: 66
Setting detail: SPECIMEN
Discharge: HOME | End: 2021-09-02
Payer: MEDICARE

## 2021-09-02 VITALS
RESPIRATION RATE: 16 BRPM | DIASTOLIC BLOOD PRESSURE: 56 MMHG | WEIGHT: 169 LBS | HEIGHT: 65 IN | SYSTOLIC BLOOD PRESSURE: 99 MMHG | BODY MASS INDEX: 28.16 KG/M2 | HEART RATE: 81 BPM | OXYGEN SATURATION: 98 %

## 2021-09-02 VITALS — SYSTOLIC BLOOD PRESSURE: 116 MMHG | DIASTOLIC BLOOD PRESSURE: 54 MMHG | OXYGEN SATURATION: 97 %

## 2021-09-02 PROCEDURE — 43239 EGD BIOPSY SINGLE/MULTIPLE: CPT | Performed by: INTERNAL MEDICINE

## 2021-09-02 PROCEDURE — 43239 EGD BIOPSY SINGLE/MULTIPLE: CPT

## 2021-09-02 PROCEDURE — 45380 COLONOSCOPY AND BIOPSY: CPT | Performed by: INTERNAL MEDICINE

## 2021-09-02 PROCEDURE — 88305 TISSUE EXAM BY PATHOLOGIST: CPT

## 2021-09-02 PROCEDURE — 88342 IMHCHEM/IMCYTCHM 1ST ANTB: CPT

## 2021-09-02 RX ORDER — LIDOCAINE HYDROCHLORIDE 10 MG/ML
INJECTION, SOLUTION INFILTRATION; PERINEURAL PRN
Status: DISCONTINUED | OUTPATIENT
Start: 2021-09-02 | End: 2021-09-02 | Stop reason: SDUPTHER

## 2021-09-02 RX ORDER — SUCRALFATE 1 G/1
1 TABLET ORAL 3 TIMES DAILY
Qty: 120 TABLET | Refills: 2 | Status: SHIPPED | OUTPATIENT
Start: 2021-09-02 | End: 2022-01-19 | Stop reason: ALTCHOICE

## 2021-09-02 RX ORDER — SODIUM CHLORIDE 9 MG/ML
INJECTION, SOLUTION INTRAVENOUS CONTINUOUS
Status: DISCONTINUED | OUTPATIENT
Start: 2021-09-02 | End: 2021-09-02 | Stop reason: HOSPADM

## 2021-09-02 RX ORDER — SODIUM CHLORIDE 9 MG/ML
INJECTION, SOLUTION INTRAVENOUS CONTINUOUS PRN
Status: DISCONTINUED | OUTPATIENT
Start: 2021-09-02 | End: 2021-09-02 | Stop reason: SDUPTHER

## 2021-09-02 RX ORDER — PROPOFOL 10 MG/ML
INJECTION, EMULSION INTRAVENOUS PRN
Status: DISCONTINUED | OUTPATIENT
Start: 2021-09-02 | End: 2021-09-02 | Stop reason: SDUPTHER

## 2021-09-02 RX ORDER — PANTOPRAZOLE SODIUM 40 MG/1
40 TABLET, DELAYED RELEASE ORAL
Qty: 30 TABLET | Refills: 2 | Status: SHIPPED | OUTPATIENT
Start: 2021-09-02 | End: 2021-09-09

## 2021-09-02 RX ADMIN — LIDOCAINE HYDROCHLORIDE 50 MG: 10 INJECTION, SOLUTION INFILTRATION; PERINEURAL at 10:48

## 2021-09-02 RX ADMIN — PROPOFOL 300 MG: 10 INJECTION, EMULSION INTRAVENOUS at 10:48

## 2021-09-02 RX ADMIN — SODIUM CHLORIDE: 9 INJECTION, SOLUTION INTRAVENOUS at 10:27

## 2021-09-02 NOTE — OP NOTE
Endoscopic Procedure Note    Patient: Ovidio Chambers : 1955  [facility] Rec#: [id number] Acc#: [id number]     Primary Care Provider Thu Austin DO  Referring Provider: CON Green    Endoscopist: Tanja Joseph MD    Date of Procedure:  2021    Procedure:   1. EGD with biopsy    Indications:   1. Epigastric pain  2. Reflux     Anesthesia:  Sedation was administered by anesthesia who monitored the patient during the procedure. Estimated Blood Loss: minimal    Procedure:   After reviewing the patient's chart and obtaining informed consent, the patient was placed in the left lateral decubitus position. A forward-viewing Olympus endoscope was lubricated and inserted through the mouth into the oropharynx. Under direct visualization, the upper esophagus was intubated. The scope was advanced to the level of the third portion of duodenum. Scope was slowly withdrawn with careful inspection of the mucosal surfaces. The scope was retroflexed for inspection of the gastric fundus and incisura. Findings and maneuvers are listed in impression below. The patient tolerated the procedure well. The scope was removed. There were no immediate complications. Findings:   Esophagus: abnormal: there is diffuse mucosal changes of esophagitis throughout the distal and mid esophagus- multiple random biopsies obtained for reflux esophagitis vs less likely EoE. There is no hiatal hernia present. Stomach:  abnormal:  There is moderately severe ulcerative gastritis with at least one clean based ulcer and erosions in the antrum of the stomach -  Gastric biopsies were taken from the antrum and body to rule out Helicobacter pylori infection. Duodenum: normal      IMPRESSION:  1. Ulcerative gastritis- biopsied  2. S/p esophageal biopsies obtained as above     RECOMMENDATIONS:    1. Await path results, the patient will be contacted in 7-10 days with biopsy results.    2.  Increase PPI to twice a day  3. Carafate TID  4. Complete NSAID avoidance  5. Follow up in office with CON Hines in 6-8 weeks. The results were discussed with the patient and family. A copy of the images obtained were given to the patient. Iman Wang am scribing for and in the presence of Dr. Amy Lai MD.  Electronically signed by Vilma Cannon RN on 9/2/2021 at 10:54 AM    I personally performed the services described in this documentation as scribed by Nakul Chamberlain, and it appears accurate and complete.      Do Estrada MD  9/2/2021

## 2021-09-02 NOTE — OP NOTE
Patient: Korey Henderson : 1955  Med Rec#: [id number] Acc#: [id number]   Primary Care Provider Gilda Ramirez DO    Date of Procedure:  2021    Endoscopist: Annika Buckley MD    Referring Provider: Gilda Ramirez DO, CON Cadet    Operation Performed: Colonoscopy with biopsy    Indications: abdominal pain, change in bowel habits    Anesthesia:  Sedation was administered by anesthesia who monitored the patient during the procedure. I met with Korey Henderson prior to procedure. We discussed the procedure itself, and I have discussed the risks of endoscopy (including-- but not limited to-- pain, discomfort, bleeding potentially requiring second endoscopic procedure and/or blood transfusion, organ perforation requiring operative repair, damage to organs near the colon, infection, aspiration, cardiopulmonary/allergic reaction), benefits, indications to endoscopy. Additionally, we discussed options other than colonoscopy. The patient expressed understanding. All questions answered. The patient decided to proceed with the procedure. Signed informed consent was placed on the chart. Blood Loss: minimal    Withdrawal time: > 6 min  Bowel Prep: adequate     Complications: no immediate complications    DESCRIPTION OF PROCEDURE:     A time out was performed. After written informed consent was obtained, the patient was placed in the left lateral position. The perianal area was inspected, and a digital rectal exam was performed. A rectal exam was performed: normal tone, no palpable lesions. At this point, a forward viewing Olympus colonoscope was inserted into the anus and carefully advanced to the terminal ileum. The cecum was identified by the ileocecal valve and the appendiceal orifice. The colonoscope was then slowly withdrawn with careful inspection of the mucosa in a linear and circumferential fashion. The scope was retroflexed in the rectum.  Suction was utilized during the procedure to remove as much air as possible from the bowel. The colonoscope was removed from the patient, and the procedure was terminated. Findings are listed below. Findings: The mucosa appeared normal throughout the entire examined colon and terminal ileum. Random colon biopsies obtained to rule out microscopic colitis. There was evidence of diverticular disease throughout the left colon. Retroflexion in the rectum was normal and revealed no further abnormalities. Recommendations:  1. Repeat colonoscopy: pending pathology- 5 years, due to family hx of colon CA  2. Await biopsy results-you will receive a letter with your results within 7-10 days. 3. With office follow up, if patient has continued symptoms, consider trial of colestid. Findings and recommendations were discussed w/ the patient. A copy of the images was provided. Ute Bah am scribing for and in the presence of Dr. Norma Ocasio MD.  Electronically signed by Parrish Lovell RN on 9/2/2021 at 11:07 AM    I personally performed the services described in this documentation as scribed by Robbie Mao, and it appears accurate and complete.      Norma Ocasio MD  9/2/2021

## 2021-09-02 NOTE — H&P
PLUS) TABS Take 1 tablet by mouth daily    Historical Provider, MD   vitamin B-12 (CYANOCOBALAMIN) 100 MCG tablet Take 100 mcg by mouth daily    Historical Provider, MD   Zinc 30 MG CAPS Take 1 capsule by mouth daily    Historical Provider, MD   loperamide (IMODIUM) 2 MG capsule Take 2 mg by mouth as needed     Historical Provider, MD   dicyclomine (BENTYL) 20 MG tablet Take 20 mg by mouth every 6 hours as needed  10/25/16   Historical Provider, MD   sucralfate (CARAFATE) 1 GM tablet Take 1 g by mouth as needed     Historical Provider, MD   aspirin 325 MG EC tablet Take 325 mg by mouth daily     Historical Provider, MD       Past Medical History:  Past Medical History:   Diagnosis Date    Bell's palsy     Carotid occlusion, left     Chronic back pain     Colon polyp     CPAP (continuous positive airway pressure) dependence     7cm to 15.8cm    Depression     Diverticulitis 2006    DM [facility]diabetes mellitus) ([facility] [address]     GERD (gastroesophageal reflux disease)     H/O diverticulitis of colon     Hiatal hernia     Hyperlipidemia     Hypertension     Hypothyroid     Irritable bowel syndrome     Neuropathy     Obstructive sleep apnea     AHI:  35.3 per HST, 12/2016    Restless legs syndrome     Rheumatoid aortitis     Sleep apnea     c-pap    TIA (transient ischemic attack)     Dr Maria Fernanda Westfall       Past Surgical History:  Past Surgical History:   Procedure Laterality Date    APPENDECTOMY      CARDIAC CATHETERIZATION      CHOLECYSTECTOMY      COLONOSCOPY  12/10/2012    Dr Yasir Yates of sigmoid, AP    COLONOSCOPY  02/24/2017    Dr Paulina Nielsen x 1, BCM x 2, 5 yr recall    ELBOW SURGERY Left 03/26/2015    HYSTERECTOMY      total    UPPER GASTROINTESTINAL ENDOSCOPY  08/08/2016    Dr Verónica Combs hernia, [place] Grade B reflux esophagitis       Social History:  Social History     Tobacco Use    Smoking status: Never Smoker    Smokeless tobacco: Never Used   Vaping Use    Vaping Use: Never used Substance Use Topics    Alcohol use: Yes     Alcohol/week: 1.0 standard drinks     Types: 1 Glasses of wine per week     Comment: occasional     Drug use: No       Vital Signs: There were no vitals filed for this visit. Physical Exam:  Cardiac:  [x]WNL  []Comments:  Pulmonary:  [x]WNL   []Comments:  Neuro/Mental Status:  [x]WNL  []Comments:  Abdominal:  [x]WNL    []Comments:  Other:   []WNL  []Comments:    Informed Consent:  The risks and benefits of the procedure have been discussed with either the patient or if they cannot consent, their representative. Assessment:  Patient examined and appropriate for planned sedation and procedure. Plan:  Proceed with planned sedation and procedure as above.          Kylie Schmitz RN

## 2021-09-02 NOTE — ANESTHESIA POSTPROCEDURE EVALUATION
Department of Anesthesiology  Postprocedure Note    Patient: Zoe Boyle  MRN: [id number]  YOB: 1955  Date of evaluation: 9/2/2021  Time:  10:54 AM     Procedure Summary     Date: 09/02/21 Room / Location: [facility] ENDO 01 / [address]    Anesthesia Start: 0987 Anesthesia Stop:     Procedures:       EGD BIOPSY (N/A Esophagus)      COLONOSCOPY DIAGNOSTIC (N/A Abdomen) Diagnosis: (EPIG PAIN, DYSPHAGIA, CHR REFLUX, BLACK STOOLS,LOWER ABD PAIN, INTERM DIARRHEA)    Surgeons: Zulay Gonzalez MD Responsible Provider: CON Minor CRNA    Anesthesia Type: general ASA Status: 2          Anesthesia Type: No value filed. Luis Phase I:      Luis Phase II:      Last vitals: Reviewed and per EMR flowsheets.        Anesthesia Post Evaluation    Patient location during evaluation: bedside  Patient participation: complete - patient participated  Level of consciousness: sleepy but conscious  Pain score: 0  Airway patency: patent  Nausea & Vomiting: no nausea and no vomiting  Complications: no  Cardiovascular status: blood pressure returned to baseline  Respiratory status: acceptable, room air and spontaneous ventilation  Hydration status: euvolemic

## 2021-09-02 NOTE — ANESTHESIA PRE PROCEDURE
Department of Anesthesiology  Preprocedure Note       Name:  Gregorio Cisneros   Age:  78 y.o.  :  1955                                          MRN:  [id number]         Date:  2021      Surgeon: Lilian Gutierrez):  Mannie Huang MD    Procedure: Procedure(s):  EGD BIOPSY  COLONOSCOPY DIAGNOSTIC    Medications prior to admission:   Prior to Admission medications    Medication Sig Start Date End Date Taking? Authorizing Provider   diphenoxylate-atropine (LOMOTIL) 2.5-0.025 MG per tablet Take 1 tablet by mouth 4 times daily as needed for Diarrhea. Historical Provider, MD   pantoprazole (PROTONIX) 40 MG tablet Take 40 mg by mouth daily    Historical Provider, MD   vitamin D (CHOLECALCIFEROL) 25 MCG (1000 UT) TABS tablet Take 1,000 Units by mouth daily    Historical Provider, MD   Turmeric 500 MG CAPS Take by mouth daily     Historical Provider, MD   Multiple Vitamins-Minerals (THERA M PLUS) TABS Take 1 tablet by mouth daily    Historical Provider, MD   vitamin B-12 (CYANOCOBALAMIN) 100 MCG tablet Take 100 mcg by mouth daily    Historical Provider, MD   Zinc 30 MG CAPS Take 1 capsule by mouth daily    Historical Provider, MD   loperamide (IMODIUM) 2 MG capsule Take 2 mg by mouth as needed     Historical Provider, MD   dicyclomine (BENTYL) 20 MG tablet Take 20 mg by mouth every 6 hours as needed  10/25/16   Historical Provider, MD   sucralfate (CARAFATE) 1 GM tablet Take 1 g by mouth as needed     Historical Provider, MD   aspirin 325 MG EC tablet Take 325 mg by mouth daily     Historical Provider, MD       Current medications:    No current facility-administered medications for this encounter. Allergies:     Allergies   Allergen Reactions    Other Swelling and Other (See Comments)     Combid stomach medicine    Prochlorperazine Nausea And Vomiting       Problem List:    Patient Active Problem List   Diagnosis Code    Carotid artery stenosis I65.29    Obstructive sleep apnea G47.33    CPAP (continuous positive airway pressure) dependence Z99.89    Restless leg syndrome G25.81       Past Medical History:        Diagnosis Date    Bell's palsy     Carotid occlusion, left     Chronic back pain     Colon polyp     CPAP (continuous positive airway pressure) dependence     7cm to 15.8cm    Depression     Diverticulitis 2006    DM (di[facility] mellitus) ([facility] [address]     GERD (gastroesophageal reflux disease)     H/O diverticulitis of colon     Hiatal hernia     Hyperlipidemia     Hypertension     Hypothyroid     Irritable bowel syndrome     Neuropathy     Obstructive sleep apnea     AHI:  35.3 per HST, 12/2016    Restless legs syndrome     Rheumatoid aortitis     Sleep apnea     c-pap    TIA (transient ischemic attack)     Dr Vigil Shinto       Past Surgical History:        Procedure Laterality Date    APPENDECTOMY      CARDIAC CATHETERIZATION      CHOLECYSTECTOMY      COLONOSCOPY  12/10/2012    Dr Angelia Alex of sigmoid, AP    COLONOSCOPY  02/24/2017    Dr Dylan Abdi x 1, BCM x 2, 5 yr recall    ELBOW SURGERY Left 03/26/2015    HYSTERECTOMY      total    UPPER GASTROINTESTINAL ENDOSCOPY  08/08/2016    Dr Olga Mullins hernia, [place] Grade B reflux esophagitis       Social History:    Social History     Tobacco Use    Smoking status: Never Smoker    Smokeless tobacco: Never Used   Substance Use Topics    Alcohol use: Yes     Alcohol/week: 1.0 standard drinks     Types: 1 Glasses of wine per week     Comment: occasional                                 Counseling given: Not Answered      Vital Signs (Current): There were no vitals filed for this visit.                                            BP Readings from Last 3 Encounters:   08/19/21 119/70   08/11/21 122/78   03/08/21 117/71       NPO Status:                                                                                 BMI:   Wt Readings from Last 3 Encounters:   08/19/21 171 lb (77.6 kg)   08/11/21 171 lb 6.4 oz (77.7 kg)   03/08/21 175 lb (79.4 kg)     There is no height or weight on file to calculate BMI.    CBC: No results found for: WBC, RBC, HGB, HCT, MCV, RDW, PLT    CMP: No results found for: NA, K, CL, CO2, BUN, CREATININE, GFRAA, AGRATIO, LABGLOM, GLUCOSE, PROT, CALCIUM, BILITOT, ALKPHOS, AST, ALT    POC Tests: No results for input(s): POCGLU, POCNA, POCK, POCCL, POCBUN, POCHEMO, POCHCT in the last 72 hours. Coags: No results found for: PROTIME, INR, APTT    HCG (If Applicable): No results found for: PREGTESTUR, PREGSERUM, HCG, HCGQUANT     ABGs: No results found for: PHART, PO2ART, TOG8OBU, DTO5NMD, BEART, F4XLWOWL     Type & Screen (If Applicable):  No results found for: LABABO, LABRH    Drug/Infectious Status (If Applicable):  No results found for: HIV, HEPCAB    COVID-19 Screening (If Applicable):   Lab Results   Component Value Date    COVID19 Not Detected 08/30/2021           Anesthesia Evaluation  Patient summary reviewed and Nursing notes reviewed  Airway: Mallampati: II  TM distance: >3 FB   Neck ROM: full  Mouth opening: > = 3 FB Dental: normal exam         Pulmonary:normal exam    (+) sleep apnea:                             Cardiovascular:    (+) hypertension:,                   Neuro/Psych:   (+) neuromuscular disease:, TIA, psychiatric history:            GI/Hepatic/Renal:   (+) hiatal hernia, GERD:,           Endo/Other:    (+) Diabetes, hypothyroidism::., .                 Abdominal:             Vascular: negative vascular ROS. Other Findings:             Anesthesia Plan      general     ASA 2       Induction: intravenous. Anesthetic plan and risks discussed with patient. Plan discussed with CRNA.                   CON Joe - JO   9/2/2021

## 2021-09-09 RX ORDER — PANTOPRAZOLE SODIUM 40 MG/1
TABLET, DELAYED RELEASE ORAL
Qty: 60 TABLET | Refills: 5 | Status: SHIPPED | OUTPATIENT
Start: 2021-09-09 | End: 2022-01-19

## 2021-10-19 ENCOUNTER — OFFICE VISIT (OUTPATIENT)
Dept: GASTROENTEROLOGY | Age: 66
End: 2021-10-19
Payer: MEDICARE

## 2021-10-19 VITALS
SYSTOLIC BLOOD PRESSURE: 112 MMHG | HEIGHT: 65 IN | DIASTOLIC BLOOD PRESSURE: 68 MMHG | HEART RATE: 68 BPM | WEIGHT: 166.8 LBS | BODY MASS INDEX: 27.79 KG/M2 | OXYGEN SATURATION: 98 %

## 2021-10-19 DIAGNOSIS — K21.00 GASTROESOPHAGEAL REFLUX DISEASE WITH ESOPHAGITIS WITHOUT HEMORRHAGE: Primary | ICD-10-CM

## 2021-10-19 DIAGNOSIS — R19.7 INTERMITTENT DIARRHEA: ICD-10-CM

## 2021-10-19 PROCEDURE — G8484 FLU IMMUNIZE NO ADMIN: HCPCS | Performed by: NURSE PRACTITIONER

## 2021-10-19 PROCEDURE — G8400 PT W/DXA NO RESULTS DOC: HCPCS | Performed by: NURSE PRACTITIONER

## 2021-10-19 PROCEDURE — 1036F TOBACCO NON-USER: CPT | Performed by: NURSE PRACTITIONER

## 2021-10-19 PROCEDURE — G8417 CALC BMI ABV UP PARAM F/U: HCPCS | Performed by: NURSE PRACTITIONER

## 2021-10-19 PROCEDURE — 99213 OFFICE O/P EST LOW 20 MIN: CPT | Performed by: NURSE PRACTITIONER

## 2021-10-19 PROCEDURE — 4040F PNEUMOC VAC/ADMIN/RCVD: CPT | Performed by: NURSE PRACTITIONER

## 2021-10-19 PROCEDURE — 1090F PRES/ABSN URINE INCON ASSESS: CPT | Performed by: NURSE PRACTITIONER

## 2021-10-19 PROCEDURE — G8427 DOCREV CUR MEDS BY ELIG CLIN: HCPCS | Performed by: NURSE PRACTITIONER

## 2021-10-19 PROCEDURE — 1123F ACP DISCUSS/DSCN MKR DOCD: CPT | Performed by: NURSE PRACTITIONER

## 2021-10-19 PROCEDURE — 3017F COLORECTAL CA SCREEN DOC REV: CPT | Performed by: NURSE PRACTITIONER

## 2021-10-19 ASSESSMENT — ENCOUNTER SYMPTOMS
DIARRHEA: 1
ABDOMINAL DISTENTION: 0
CONSTIPATION: 0
COUGH: 0
RECTAL PAIN: 0
ANAL BLEEDING: 0
ABDOMINAL PAIN: 0
CHOKING: 0
VOMITING: 0
BLOOD IN STOOL: 0
SHORTNESS OF BREATH: 0
NAUSEA: 0
TROUBLE SWALLOWING: 0

## 2021-10-19 NOTE — PATIENT INSTRUCTIONS

## 2021-10-19 NOTE — PROGRESS NOTES
Subjective:     Patient ID: Germán Hyatt is a 78 y.o. female  PCP: Alexandria Londono DO  Referring Provider: No ref. provider found    HPI  Patient presents to the office today with the following complaints: Follow-up      Germán Hyatt is here for follow up after EGD & Colonoscopy on 9/2/2021. During her last visit, she had c/o abdominal pain, nausea, diarrhea. Today, she reports symptoms have improved. She is currently taking Protonix 40 mg BID. She reports abdominal pain has improved. Diarrhea comes and goes. Symptoms are manageable. EGD Findings 9/2/2021:   Esophagus: abnormal: there is diffuse mucosal changes of esophagitis throughout the distal and mid esophagus- multiple random biopsies obtained for reflux esophagitis vs less likely EoE. There is no hiatal hernia present. Stomach:  abnormal:  There is moderately severe ulcerative gastritis with at least one clean based ulcer and erosions in the antrum of the stomach -  Gastric biopsies were taken from the antrum and body to rule out Helicobacter pylori infection. Duodenum: normal  IMPRESSION:  1. Ulcerative gastritis- biopsied  2. S/p esophageal biopsies obtained as above  RECOMMENDATIONS:    1. Await path results, the patient will be contacted in 7-10 days with biopsy results. 2.  Increase PPI to twice a day  3. Carafate TID  4. Complete NSAID avoidance  5. Follow up in office with CON Corona in 6-8 weeks. Colonoscopy Findings 9/2/2021:   The mucosa appeared normal throughout the entire examined colon and terminal ileum. Random colon biopsies obtained to rule out microscopic colitis. There was evidence of diverticular disease throughout the left colon. Retroflexion in the rectum was normal and revealed no further abnormalities. Recommendations:  1. Repeat colonoscopy: pending pathology- 5 years, due to family hx of colon CA  2.  Await biopsy results-you will receive a letter with your results within 7-10 days.  3. With office follow up, if patient has continued symptoms, consider trial of colestid. FINAL DIAGNOSIS:   A.  Stomach, gastric biopsy:   1.  Benign gastric mucosa with minimal chronic inflammation. 2.  No Helicobacter pylori organisms identified by immunohistochemical   stain. Andrea Mancilla, random esophageal biopsies:   1.  Benign junctional type gastroesophageal mucosa with mild chronic inflammation and reactive epithelial changes, negative for evidence of Gorman's esophagus. 2.  Moderate eosinophilic inflammation identified in some fragments. 3.  Changes consistent with reflux esophagitis. Gigi Degroot, random colonic biopsies: Benign colonic mucosa with no significant histopathologic changes. All scope and pathology reports were reviewed and discussed with patient. All questions answered, pt verbalized understanding. Assessment:     1. Gastroesophageal reflux disease with esophagitis without hemorrhage    2. Intermittent diarrhea            Plan:   - Continue PPI at BID for 2 more months, then back to once daily  - Follow up in 3 months or sooner if needed to check symptoms  - Call with any questions or concerns      Orders  No orders of the defined types were placed in this encounter. Medications  No orders of the defined types were placed in this encounter.         Patient History:     Past Medical History:   Diagnosis Date    Bell's palsy     Carotid occlusion, left     Chronic back pain     Colon polyp     CPAP (continuous positive airway pressure) dependence     7cm to 15.8cm    Depression     Diverticulitis 2006    DM (diabetes mellitus) ([facility] [address]     GERD (gastroesophageal reflux disease)     H/O diverticulitis of colon     Hiatal hernia     Hyperlipidemia     Hypertension     Hypothyroid     Irritable bowel syndrome     Neuropathy     Obstructive sleep apnea     AHI:  35.3 per HST, 12/2016    Restless legs syndrome     Rheumatoid aortitis     Sleep apnea c-pap    TIA (transient ischemic attack)     Dr Patricia Saab       Past Surgical History:   Procedure Laterality Date   [facility] COLONOSCOPY  12/10/2012    Dr Jacobs Place of sigmoid, AP    COLONOSCOPY  02/24/2017    Dr Reyes Marino x 1, BCM x 2, 5 yr recall    COLONOSCOPY N/A 09/02/2021    Dr Taran Claros-Diverticular-BCM, 5 yr recall    ELBOW SURGERY Left 03/26/2015    HYSTERECTOMY      total    UPPER GASTROINTESTINAL ENDOSCOPY  08/08/2016    Dr Aj Ring hernia[place] Grade B reflux esophagitis    UPPER GASTROINTESTINAL ENDOSCOPY N/A 09/02/2021    Dr Nichelle Meza ulcerative gastritis, esophagitis, EOE?, no h pylori       Family History   Problem Relation Age of Onset    Diabetes Mother     High Blood Pressure Mother     High Cholesterol Mother     Stroke Mother     Cancer Father         ?stomach     Other Sister         Blood CLots    Heart Disease Sister     Colon Cancer Brother     Pancreatic Cancer Brother     Colon Polyps Brother     Esophageal Cancer Neg Hx     Liver Disease Neg Hx     Stomach Cancer Neg Hx     Ulcerative Colitis Neg Hx        Social History     Socioeconomic History    Marital status:      Spouse name: None    Number of children: None    Years of education: None    Highest education level: None   Occupational History    None   Tobacco Use    Smoking status: Never Smoker    Smokeless tobacco: Never Used   Vaping Use    Vaping Use: Never used   Substance and Sexual Activity    Alcohol use:  Yes     Alcohol/week: 1.0 standard drinks     Types: 1 Glasses of wine per week     Comment: occasional     Drug use: No    Sexual activity: None   Other Topics Concern    None   Social History Narrative    None     Social Determinants of Health     Financial Resource Strain:     Difficulty of Paying Living Expenses:    Food Insecurity:     Worried About Running Out of Food in the Last Year:     Ran Out of Food in the Last Year:    Transportation Needs:     Lack of Transportation (Medical):  Lack of Transportation (Non-Medical):    Physical Activity:     Days of Exercise per Week:     Minutes of Exercise per Session:    Stress:     Feeling of Stress :    Social Connections:     Frequency of Communication with Friends and Family:     Frequency of Social Gatherings with Friends and Family:     Attends [employer] Services:     Active Member of Clubs or Organizations:     Attends Club or Organization Meetings:     Marital Status:    Intimate Partner Violence:     Fear of Current or Ex-Partner:     Emotionally Abused:     Physically Abused:     Sexually Abused:        Current Outpatient Medications   Medication Sig Dispense Refill    pantoprazole (PROTONIX) 40 MG tablet TAKE 1 TABLET BY MOUTH TWICE A DAY BEFORE MEALS 60 tablet 5    sucralfate (CARAFATE) 1 GM tablet Take 1 tablet by mouth 3 times daily (Patient taking differently: Take 1 g by mouth 2 times daily ) 120 tablet 2    diphenoxylate-atropine (LOMOTIL) 2.5-0.025 MG per tablet Take 1 tablet by mouth 4 times daily as needed for Diarrhea.  vitamin D (CHOLECALCIFEROL) 25 MCG (1000 UT) TABS tablet Take 1,000 Units by mouth daily      Multiple Vitamins-Minerals (THERA M PLUS) TABS Take 1 tablet by mouth daily      vitamin B-12 (CYANOCOBALAMIN) 100 MCG tablet Take 100 mcg by mouth daily      Zinc 30 MG CAPS Take 1 capsule by mouth daily      loperamide (IMODIUM) 2 MG capsule Take 2 mg by mouth as needed       dicyclomine (BENTYL) 20 MG tablet Take 20 mg by mouth every 6 hours as needed        No current facility-administered medications for this visit. Allergies   Allergen Reactions    Other Swelling and Other (See Comments)     Combid stomach medicine    Prochlorperazine Nausea And Vomiting       Review of Systems   Constitutional: Negative for activity change, appetite change, fatigue, fever and unexpected weight change. HENT: Negative for trouble swallowing. Respiratory: Negative for cough, choking and shortness of breath. Cardiovascular: Negative for chest pain. Gastrointestinal: Positive for diarrhea (intermittent). Negative for abdominal distention, abdominal pain, anal bleeding, blood in stool, constipation, nausea, rectal pain and vomiting. Allergic/Immunologic: Negative for food allergies. All other systems reviewed and are negative. Objective:     /68   Pulse 68   Ht 5' 5\" (1.651 m)   Wt 166 lb 12.8 oz (75.7 kg)   SpO2 98%   BMI 27.76 kg/m²     Physical Exam  Vitals reviewed. Constitutional:       General: She is not in acute distress. Appearance: She is well-developed. Eyes:      General:         Right eye: No discharge. Left eye: No discharge. Cardiovascular:      Rate and Rhythm: Normal rate and regular rhythm. Heart sounds: No murmur heard. Pulmonary:      Effort: Pulmonary effort is normal. No respiratory distress. Breath sounds: Normal breath sounds. Abdominal:      General: Bowel sounds are normal. There is no distension. Palpations: Abdomen is soft. There is no mass. Tenderness: There is no abdominal tenderness. There is no guarding or rebound. Musculoskeletal:         General: Normal range of motion. Cervical back: Normal range of motion. Skin:     General: Skin is warm and dry. Neurological:      Mental Status: She is alert and oriented to person, place, and time.    Psychiatric:         Behavior: Behavior normal.

## 2022-01-19 ENCOUNTER — OFFICE VISIT (OUTPATIENT)
Dept: GASTROENTEROLOGY | Age: 67
End: 2022-01-19
Payer: MEDICARE

## 2022-01-19 VITALS
SYSTOLIC BLOOD PRESSURE: 112 MMHG | HEART RATE: 71 BPM | DIASTOLIC BLOOD PRESSURE: 72 MMHG | WEIGHT: 164.4 LBS | OXYGEN SATURATION: 96 % | BODY MASS INDEX: 27.39 KG/M2 | HEIGHT: 65 IN

## 2022-01-19 DIAGNOSIS — R19.7 INTERMITTENT DIARRHEA: ICD-10-CM

## 2022-01-19 DIAGNOSIS — K21.00 GASTROESOPHAGEAL REFLUX DISEASE WITH ESOPHAGITIS WITHOUT HEMORRHAGE: Primary | ICD-10-CM

## 2022-01-19 PROCEDURE — 3017F COLORECTAL CA SCREEN DOC REV: CPT | Performed by: NURSE PRACTITIONER

## 2022-01-19 PROCEDURE — 1090F PRES/ABSN URINE INCON ASSESS: CPT | Performed by: NURSE PRACTITIONER

## 2022-01-19 PROCEDURE — G8427 DOCREV CUR MEDS BY ELIG CLIN: HCPCS | Performed by: NURSE PRACTITIONER

## 2022-01-19 PROCEDURE — G8484 FLU IMMUNIZE NO ADMIN: HCPCS | Performed by: NURSE PRACTITIONER

## 2022-01-19 PROCEDURE — 99213 OFFICE O/P EST LOW 20 MIN: CPT | Performed by: NURSE PRACTITIONER

## 2022-01-19 PROCEDURE — 4040F PNEUMOC VAC/ADMIN/RCVD: CPT | Performed by: NURSE PRACTITIONER

## 2022-01-19 PROCEDURE — G8400 PT W/DXA NO RESULTS DOC: HCPCS | Performed by: NURSE PRACTITIONER

## 2022-01-19 PROCEDURE — 1123F ACP DISCUSS/DSCN MKR DOCD: CPT | Performed by: NURSE PRACTITIONER

## 2022-01-19 PROCEDURE — 1036F TOBACCO NON-USER: CPT | Performed by: NURSE PRACTITIONER

## 2022-01-19 PROCEDURE — G8417 CALC BMI ABV UP PARAM F/U: HCPCS | Performed by: NURSE PRACTITIONER

## 2022-01-19 RX ORDER — PANTOPRAZOLE SODIUM 40 MG/1
40 TABLET, DELAYED RELEASE ORAL DAILY
Qty: 90 TABLET | Refills: 3 | Status: SHIPPED | OUTPATIENT
Start: 2022-01-19

## 2022-01-19 ASSESSMENT — ENCOUNTER SYMPTOMS
CONSTIPATION: 0
ANAL BLEEDING: 0
CHOKING: 0
SHORTNESS OF BREATH: 0
COUGH: 0
TROUBLE SWALLOWING: 0
NAUSEA: 0
DIARRHEA: 1
ABDOMINAL DISTENTION: 0
BLOOD IN STOOL: 0
ABDOMINAL PAIN: 1
RECTAL PAIN: 0
VOMITING: 0

## 2022-01-19 NOTE — PATIENT INSTRUCTIONS
of fluids as you increase your fiber intake.         Fruits  Serving size  Total fiber (grams)    Pear  1 medium  5.1    Figs, dried  2 medium  3.7    Blueberries  1 cup  3.5    Apple, with skin  1 medium  4.4    Strawberries  1 cup  3.3    Peaches, dried  3 halves  3.2    Orange  1 medium  3.1    Apricots, dried  10 halves  2.6    Raisins  1.5-ounce box  1.6    Grains, cereal & pasta  Serving size  Total fiber (grams)    Spaghetti, whole-wheat  1 cup  6.3    Bran flakes  3/4 cup  5.1    Oatmeal  1 cup  4.0    Bread, rye  1 slice  1.9    Bread, whole-wheat  1 slice  1.9    Bread, mixed-grain  1 slice  1.7    Bread, cracked-wheat  1 slice  1.4

## 2022-01-19 NOTE — PROGRESS NOTES
Subjective:     Patient ID: Mp Singletary is a 78 y.o. female  PCP: Collin Clarke DO  Referring Provider: Reyes Yu,*    HPI  Patient presents to the office today with the following complaints: Follow-up      Pt here today for follow up. EGD and Colonoscopy 9/2021 noted esophagitis and ulcerative gastritis. PPI was recommended at BID dosing for 3 months, then back to once daily. Pt here to check symptoms after decreasing PPI dosing. Today, pt states she is still taking Protonix BID. She states reflux is controlled. She has hx intermitent diarrhea, this is chronic for her. She states symptoms are currently manageable. She will use Dicyclomine prn. Last EGD - esophagitis, ulcerative gastritis, (-) Gorman's  Last Colonoscopy 9/2021 - normal, 5 year recall  Family hx colon cancer and colon polyps - Brother    Assessment:     1. Gastroesophageal reflux disease with esophagitis without hemorrhage    2. Intermittent diarrhea            Plan:   - Decrease Protonix to once daily, refills provided  - High Fiber Diet  - Follow up yearly or sooner if needed  - Call with any questions or concerns      Orders  No orders of the defined types were placed in this encounter.     Medications  Orders Placed This Encounter   Medications    pantoprazole (PROTONIX) 40 MG tablet     Sig: Take 1 tablet by mouth daily     Dispense:  90 tablet     Refill:  3         Patient History:     Past Medical History:   Diagnosis Date    Bell's palsy     Carotid occlusion, left     Chronic back pain     Colon polyp     CPAP (continuous positive airway pressure) dependence     7cm to 15.8cm    Depression     Diverticulitis 2006    DM (diabetes mellitus) ([facility] [address]     GERD (gastroesophageal reflux disease)     H/O diverticulitis of colon     Hiatal hernia     Hyperlipidemia     Hypertension     Hypothyroid     Irritable bowel syndrome     Neuropathy     Obstructive sleep apnea     AHI:  35.3 per HST, 12/2016    Restless legs syndrome     Rheumatoid aortitis     Sleep apnea     c-pap    TIA (transient ischemic attack)     Dr Linda Luevano       Past Surgical History:   Procedure Laterality Date   Zion Roberts COLONOSCOPY  12/10/2012    Dr Cris Garcia of sigmoid, AP    COLONOSCOPY  02/24/2017    Dr Sergei Dowd x 1, BCM x 2, 5 yr recall    COLONOSCOPY N/A 09/02/2021    Dr Mariposa [facility], 5 yr recall    ELBOW SURGERY Left 03/26/2015    HYSTERECTOMY      total    UPPER GASTROINTESTINAL ENDOSCOPY  08/08/2016    Dr Butterfield Purchase hernia, [place] Grade B reflux esophagitis    UPPER GASTROINTESTINAL ENDOSCOPY N/A 09/02/2021    Dr Mahmood Hidden ulcerative gastritis, esophagitis, EOE?, no h pylori       Family History   Problem Relation Age of Onset    Diabetes Mother     High Blood Pressure Mother     High Cholesterol Mother     Stroke Mother     Cancer Father         ?stomach     Other Sister         Blood CLots    Heart Disease Sister     Colon Cancer Brother     Pancreatic Cancer Brother     Colon Polyps Brother     Esophageal Cancer Neg Hx     Liver Disease Neg Hx     Stomach Cancer Neg Hx     Ulcerative Colitis Neg Hx        Social History     Socioeconomic History    Marital status:      Spouse name: None    Number of children: None    Years of education: None    Highest education level: None   Occupational History    None   Tobacco Use    Smoking status: Never Smoker    Smokeless tobacco: Never Used   Vaping Use    Vaping Use: Never used   Substance and Sexual Activity    Alcohol use:  Yes     Alcohol/week: 1.0 standard drink     Types: 1 Glasses of wine per week     Comment: occasional     Drug use: No    Sexual activity: None   Other Topics Concern    None   Social History Narrative    None     Social Determinants of Health     Financial Resource Strain:     Difficulty of Paying Living Expenses: Not on file   Food Insecurity:     Worried About Running Out of Food in the Last Year: Not on file    Bella of Food in the Last Year: Not on file   Transportation Needs:     Lack of Transportation (Medical): Not on file    Lack of Transportation (Non-Medical): Not on file   Physical Activity:     Days of Exercise per Week: Not on file    Minutes of Exercise per Session: Not on file   Stress:     Feeling of Stress : Not on file   Social Connections:     Frequency of Communication with Friends and Family: Not on file    Frequency of Social Gatherings with Friends and Family: Not on file    Attends Confucianist Services: Not on file    Active Member of [address] [place] [address] [employer] or Organizations: Not on file    Attends Club or Organization Meetings: Not on file    Marital Status: Not on file   Intimate Partner Violence:     Fear of Current or Ex-Partner: Not on file    Emotionally Abused: Not on file    Physically Abused: Not on file    Sexually Abused: Not on file   Housing Stability:     Unable to Pay for Housing in the Last Year: Not on file    Number of [place] in the Last Year: Not on file    Unstable Housing in the Last Year: Not on file       Current Outpatient Medications   Medication Sig Dispense Refill    pantoprazole (PROTONIX) 40 MG tablet Take 1 tablet by mouth daily 90 tablet 3    diphenoxylate-atropine (LOMOTIL) 2.5-0.025 MG per tablet Take 1 tablet by mouth 4 times daily as needed for Diarrhea.       vitamin D (CHOLECALCIFEROL) 25 MCG (1000 UT) TABS tablet Take 1,000 Units by mouth daily      Multiple Vitamins-Minerals (THERA M PLUS) TABS Take 1 tablet by mouth daily      vitamin B-12 (CYANOCOBALAMIN) 100 MCG tablet Take 100 mcg by mouth daily      Zinc 30 MG CAPS Take 1 capsule by mouth daily      loperamide (IMODIUM) 2 MG capsule Take 2 mg by mouth as needed       dicyclomine (BENTYL) 20 MG tablet Take 20 mg by mouth every 6 hours as needed        No current facility-administered medications for this visit. Allergies   Allergen Reactions    Other Swelling and Other (See Comments)     Combid stomach medicine    Prochlorperazine Nausea And Vomiting       Review of Systems   Constitutional: Negative for activity change, appetite change, fatigue, fever and unexpected weight change. HENT: Negative for trouble swallowing. Respiratory: Negative for cough, choking and shortness of breath. Cardiovascular: Negative for chest pain. Gastrointestinal: Positive for abdominal pain and diarrhea. Negative for abdominal distention, anal bleeding, blood in stool, constipation, nausea, rectal pain and vomiting. Reflux - controlled   Allergic/Immunologic: Negative for food allergies. All other systems reviewed and are negative. Objective:     /72   Pulse 71   Ht 5' 5\" (1.651 m)   Wt 164 lb 6.4 oz (74.6 kg)   SpO2 96%   BMI 27.36 kg/m²     Physical Exam  Vitals reviewed. Constitutional:       General: She is not in acute distress. Appearance: She is well-developed. HENT:      Nose:      Comments: Mask on     Mouth/Throat:      Comments: Mask on  Eyes:      General:         Right eye: No discharge. Left eye: No discharge. Cardiovascular:      Rate and Rhythm: Normal rate and regular rhythm. Heart sounds: No murmur heard. Pulmonary:      Effort: Pulmonary effort is normal. No respiratory distress. Breath sounds: Normal breath sounds. Abdominal:      General: Bowel sounds are normal. There is no distension. Palpations: Abdomen is soft. There is no mass. Tenderness: There is no abdominal tenderness. There is no guarding or rebound. Musculoskeletal:         General: Normal range of motion. Cervical back: Normal range of motion. Skin:     General: Skin is warm and dry. Neurological:      Mental Status: She is alert and oriented to person, place, and time.    Psychiatric:         Behavior: Behavior normal.

## 2022-03-02 NOTE — PROGRESS NOTES
"03/11/2022       Collette Xiong,   [address]  [facility] [address]    Edie Oconnor  1955    Chief Complaint   Patient presents with   • Carotid Artery Disease     1 year follow-up for Bilateral Carotid Artery Stenosis - US 03/04/22.  Pt states that she has been experiencing headaches from the back of her head, dizziness and light headed. Pt denies any stroke-like symptoms.   • Non-Smoker     Pt verified Never Smoker.       Dear Collette Xiong,        HPI  I had the pleasure of seeing your patient Edie Oconnor in the office today.  As you recall, Edie Oconnor is a 67 y.o.  female who we are following for asymptomatic carotid occlusive disease.  Currently she is doing well denies any strokelike symptoms.  She is complaining of headaches in the back of her head with some dizziness and lightheadedness.  She was previously on full dose aspirin however is no longer taking this.  She does have a previous history of TIA.  She had noninvasive testing performed today, which I did review in office.    Review of Systems   Constitutional: Negative.    Eyes: Negative.    Respiratory: Negative.    Cardiovascular: Negative.    Gastrointestinal: Negative.    Endocrine: Negative.    Genitourinary: Negative.    Musculoskeletal: Negative.    Skin: Negative.    Allergic/Immunologic: Negative.    Neurological: Positive for dizziness, light-headedness and headaches.   Hematological: Negative.    Psychiatric/Behavioral: Negative.         /84 (BP Location: Left arm, Patient Position: Sitting, Cuff Size: Adult)   Pulse 69   Resp 18   Ht 165.1 cm (65\")   Wt 75.3 kg (166 lb)   SpO2 97%   BMI 27.62 kg/m²   Physical Exam  Vitals and nursing note reviewed.   Constitutional:       General: She is not in acute distress.     Appearance: Normal appearance. She is well-developed and overweight. She is not diaphoretic.   HENT:      Head: Normocephalic and atraumatic.   Eyes:      General: No " scleral icterus.     Pupils: Pupils are equal, round, and reactive to light.   Neck:      Thyroid: No thyromegaly.      Vascular: No carotid bruit or JVD.   Cardiovascular:      Rate and Rhythm: Normal rate and regular rhythm.      Pulses: Normal pulses.      Heart sounds: Normal heart sounds and S2 normal. No murmur heard.    No friction rub. No gallop.   Pulmonary:      Effort: Pulmonary effort is normal.      Breath sounds: Normal breath sounds.   Abdominal:      General: Bowel sounds are normal.      Palpations: Abdomen is soft.   Musculoskeletal:         General: Swelling (Mild) present. Normal range of motion.      Cervical back: Normal range of motion and neck supple.   Skin:     General: Skin is warm and dry.   Neurological:      General: No focal deficit present.      Mental Status: She is alert and oriented to person, place, and time.      Cranial Nerves: No cranial nerve deficit.   Psychiatric:         Mood and Affect: Mood normal.         Behavior: Behavior normal. Behavior is cooperative.         Thought Content: Thought content normal.         Judgment: Judgment normal.             Diagnostic Data:    US Carotid Bilateral    Result Date: 3/4/2022  Narrative: History: Carotid occlusive disease      Impression: Impression: 1. There is less than 50% stenosis of the right internal carotid artery. 2. There is less than 50% stenosis of the left internal carotid artery. 3. Antegrade flow is demonstrated in bilateral vertebral arteries.  Comments: Bilateral carotid vertebral arterial duplex scan was performed.  Grayscale imaging shows intimal thickening and calcified elements at the carotid bifurcation. The right internal carotid artery peak systolic velocity is 86.1 cm/sec. The end-diastolic velocity is 31.1 cm/sec. The right ICA/CCA ratio is approximately 1.0 . These findings correlate with less than 50% stenosis of the right internal carotid artery.  Grayscale imaging shows intimal thickening and calcified  elements at the carotid bifurcation. The left internal carotid artery peak systolic velocity is 108 cm/sec. The end-diastolic velocity is 34.9 cm/sec. The left ICA/CCA ratio is approximately 1.4 . These findings correlate with less than 50% stenosis of the left internal carotid artery.  Antegrade flow is demonstrated in bilateral vertebral arteries.  This report was finalized on 03/04/2022 16:22 by Dr. Dwayne Skinner MD.      Patient Active Problem List   Diagnosis   • Transient cerebral ischemia   • LAN on CPAP   • Dyslipidemia   • Essential hypertension   • Abnormal CT of the abdomen   • Colitis   • Nonsmoker   • Obesity, unspecified obesity severity, unspecified obesity type   • Carotid artery stenosis   • CPAP (continuous positive airway pressure) dependence   • Diarrhea   • Dyspepsia   • Gastric reflux syndrome   • Injury of shoulder and upper arm   • Open fracture of proximal phalanx of index finger   • Pain in elbow   • Restless leg syndrome         ICD-10-CM ICD-9-CM   1. Bilateral carotid artery stenosis  I65.23 433.10     433.30   2. Essential hypertension  I10 401.9   3. Dyslipidemia  E78.5 272.4       Plan: After thoroughly evaluating Edie Oconnor, I believe the best course of action is to remain conservative from vascular surgery standpoint.  Currently she is doing well denies strokelike symptoms.  She does have some dizziness and lightheadedness.  I did review her testing which shows less than 50% carotid stenosis bilaterally.  She could continue to wear compression stockings to aid with her leg swelling.  We will see her back in 1 year with repeat noninvasive testing for continued surveillance, including a carotid duplex.  I did discuss vascular risk factors as it pertain to the progression of vascular disease including controlling her hypertension and dyslipidemia.  Her blood pressure stable on her current medications.  I would recommend she begin 81 mg aspirin daily.   Patient's Body mass index  is 27.62 kg/m². BMI is above normal parameters. Recommendations include: educational material.   The patient can continue taking their current medication regimen as previously planned.  This was all discussed in full with complete understanding.    Thank you for allowing me to participate in the care of your patient.  Please do not hesitate with any questions or concerns.  I will keep you aware of any further encounters with Edie Oconnor.        Sincerely yours,         JONEL Mclean

## 2022-03-04 ENCOUNTER — HOSPITAL ENCOUNTER (OUTPATIENT)
Dept: ULTRASOUND IMAGING | Facility: HOSPITAL | Age: 67
Discharge: HOME | End: 2022-03-04
Admitting: NURSE PRACTITIONER

## 2022-03-04 DIAGNOSIS — I65.23 BILATERAL CAROTID ARTERY STENOSIS: ICD-10-CM

## 2022-03-04 PROCEDURE — 93880 EXTRACRANIAL BILAT STUDY: CPT

## 2022-03-04 PROCEDURE — 93880 EXTRACRANIAL BILAT STUDY: CPT | Performed by: SURGERY

## 2022-03-10 ENCOUNTER — TELEPHONE (OUTPATIENT)
Dept: VASCULAR SURGERY | Facility: CLINIC | Age: 67
End: 2022-03-10

## 2022-03-10 NOTE — TELEPHONE ENCOUNTER
Left message reminding Mrs Oconnor of her appointment for Friday, March 11th, 2022 at 930 am with Colleen REMY. Also advised Mrs Oconnor if she had any questions, concerns or needs to reschedule to please call the office at [phone].

## 2022-03-11 ENCOUNTER — OFFICE VISIT (OUTPATIENT)
Dept: VASCULAR SURGERY | Facility: CLINIC | Age: 67
End: 2022-03-11

## 2022-03-11 VITALS
DIASTOLIC BLOOD PRESSURE: 84 MMHG | WEIGHT: 166 LBS | HEART RATE: 69 BPM | BODY MASS INDEX: 27.66 KG/M2 | RESPIRATION RATE: 18 BRPM | SYSTOLIC BLOOD PRESSURE: 124 MMHG | HEIGHT: 65 IN | OXYGEN SATURATION: 97 %

## 2022-03-11 DIAGNOSIS — I10 ESSENTIAL HYPERTENSION: ICD-10-CM

## 2022-03-11 DIAGNOSIS — I65.23 BILATERAL CAROTID ARTERY STENOSIS: Primary | ICD-10-CM

## 2022-03-11 DIAGNOSIS — E78.5 DYSLIPIDEMIA: ICD-10-CM

## 2022-03-11 PROBLEM — R10.13 DYSPEPSIA: Status: ACTIVE | Noted: 2022-03-11

## 2022-03-11 PROBLEM — S62.618B: Status: ACTIVE | Noted: 2022-03-11

## 2022-03-11 PROBLEM — S49.90XA INJURY OF SHOULDER AND UPPER ARM: Status: ACTIVE | Noted: 2019-06-19

## 2022-03-11 PROBLEM — K21.9 GASTRIC REFLUX SYNDROME: Status: ACTIVE | Noted: 2022-03-11

## 2022-03-11 PROBLEM — G25.81 RESTLESS LEG SYNDROME: Status: ACTIVE | Noted: 2021-08-19

## 2022-03-11 PROBLEM — Z99.89 CPAP (CONTINUOUS POSITIVE AIRWAY PRESSURE) DEPENDENCE: Status: ACTIVE | Noted: 2022-03-11

## 2022-03-11 PROBLEM — R19.7 DIARRHEA: Status: ACTIVE | Noted: 2022-03-11

## 2022-03-11 PROCEDURE — 99214 OFFICE O/P EST MOD 30 MIN: CPT | Performed by: NURSE PRACTITIONER

## 2022-03-11 RX ORDER — DIPHENOXYLATE HYDROCHLORIDE AND ATROPINE SULFATE 2.5; .025 MG/1; MG/1
1 TABLET ORAL DAILY
COMMUNITY

## 2022-03-11 RX ORDER — PANTOPRAZOLE SODIUM 40 MG/1
40 TABLET, DELAYED RELEASE ORAL DAILY
COMMUNITY
Start: 2022-01-19

## 2022-03-11 RX ORDER — DOXYCYCLINE HYCLATE 20 MG
1 TABLET ORAL AS NEEDED
COMMUNITY

## 2022-09-09 ENCOUNTER — TELEPHONE (OUTPATIENT)
Dept: NEUROLOGY | Age: 67
End: 2022-09-09

## 2022-09-09 NOTE — TELEPHONE ENCOUNTER
Called and left patient a VM to let her know that her appointment for 09-13-22 with NICOLLE Lira had to be rescheduled due to provider will be out in the AM. Left on VM of when I have her appointment rescheduled too.

## 2022-09-16 ENCOUNTER — TELEPHONE (OUTPATIENT)
Dept: NEUROLOGY | Age: 67
End: 2022-09-16

## 2022-09-16 NOTE — TELEPHONE ENCOUNTER
Called and left patient a VM to confirm her appointment for 09-20-22 with NICOLLE Pacheco and to take her SD from her sleep machine and take it [phone] [address] for them to do a download report for her appointment.

## 2022-09-20 ENCOUNTER — OFFICE VISIT (OUTPATIENT)
Dept: NEUROLOGY | Age: 67
End: 2022-09-20
Payer: MEDICARE

## 2022-09-20 VITALS
DIASTOLIC BLOOD PRESSURE: 73 MMHG | SYSTOLIC BLOOD PRESSURE: 114 MMHG | BODY MASS INDEX: 27.32 KG/M2 | WEIGHT: 164 LBS | OXYGEN SATURATION: 97 % | HEIGHT: 65 IN | HEART RATE: 60 BPM

## 2022-09-20 DIAGNOSIS — G47.33 OBSTRUCTIVE SLEEP APNEA: Primary | ICD-10-CM

## 2022-09-20 DIAGNOSIS — Z99.89 CPAP (CONTINUOUS POSITIVE AIRWAY PRESSURE) DEPENDENCE: ICD-10-CM

## 2022-09-20 DIAGNOSIS — G25.81 RESTLESS LEG SYNDROME: ICD-10-CM

## 2022-09-20 PROCEDURE — 99214 OFFICE O/P EST MOD 30 MIN: CPT | Performed by: PHYSICIAN ASSISTANT

## 2022-09-20 PROCEDURE — G8427 DOCREV CUR MEDS BY ELIG CLIN: HCPCS | Performed by: PHYSICIAN ASSISTANT

## 2022-09-20 PROCEDURE — 1090F PRES/ABSN URINE INCON ASSESS: CPT | Performed by: PHYSICIAN ASSISTANT

## 2022-09-20 PROCEDURE — 1036F TOBACCO NON-USER: CPT | Performed by: PHYSICIAN ASSISTANT

## 2022-09-20 PROCEDURE — 3017F COLORECTAL CA SCREEN DOC REV: CPT | Performed by: PHYSICIAN ASSISTANT

## 2022-09-20 PROCEDURE — G8400 PT W/DXA NO RESULTS DOC: HCPCS | Performed by: PHYSICIAN ASSISTANT

## 2022-09-20 PROCEDURE — 1123F ACP DISCUSS/DSCN MKR DOCD: CPT | Performed by: PHYSICIAN ASSISTANT

## 2022-09-20 PROCEDURE — G8417 CALC BMI ABV UP PARAM F/U: HCPCS | Performed by: PHYSICIAN ASSISTANT

## 2022-09-20 NOTE — PROGRESS NOTES
[facility] Neurology and Sleep Medicine  [address] [place] [address] [address], [address]  [facility], [place] [address]  Phone [phone]  Fax [phone]         [facility] Sleep Follow Up Encounter      Information:   Patient Name: David Samuels  :   1955  Age:   75 y.o. MRN:   [id number]  Account #:  [de-identified]  Today:                2022    Provider:  Iggy Dave PA-C    Chief Complaint   Patient presents with    Sleep Apnea        Subjective:   David Samuels is a 75 y.o. female  with a history of severe MELVIN and RLS who comes in for a sleep clinic follow up. The HST, 2016 revealed an AHI of 35.3. She is prescribed auto CPAP therapy at a pressure of 7cm to 15.8cm. She hasn't been sleeping well. She may sleep 4-6 hours per night. She sometimes removes the mask unknowingly. She has had difficulty with the head gear. She takes melatonin and sometimes Benadryl or Tylenol PM. The compliance report indicates that she is averaging 7.5 hours of CPAP use per day. The RLS is stable.     Location or symptom:  MELVIN  Onset:  Repeat PS2016  Timing:  q hs  Severity:  Severe  Associated:  Snoring, witnessed apneas, and excessive daytime somnolence  Alleviated:  CPAP      Objective:     Past Medical History:   Diagnosis Date    Bell's palsy     Carotid occlusion, left     Chronic back pain     Colon polyp     CPAP (continuous positive airway pressure) dependence     7cm to 15.8cm    Depression     Diverticulitis     DM (diabetes mellitus) (HCC)     GERD (gastroesophageal reflux disease)     H/O diverticulitis of colon     Hiatal hernia     Hyperlipidemia     Hypertension     Hypothyroid     Irritable bowel syndrome     Neuropathy     Obstructive sleep apnea     AHI:  35.3 per HST, 2016    Restless legs syndrome     Rheumatoid aortitis     Sleep apnea     c-pap    TIA (transient ischemic attack)     Dr Rg Goel       Past Surgical History:   Procedure Laterality Date    APPENDECTOMY      CARDIAC CATHETERIZATION CHOLECYSTECTOMY      COLONOSCOPY  12/10/2012    Dr David Castillo of sigmoid, AP    COLONOSCOPY  02/24/2017    Dr An Neither x 1, BCM x 2, 5 yr recall    COLONOSCOPY N/A 09/02/2021    Dr Umberto Slater Lstpj-[id number]-XXG, 5 yr recall    ELBOW SURGERY Left 03/26/2015    HYSTERECTOMY (CERVIX STATUS UNKNOWN)      total    UPPER GASTROINTESTINAL ENDOSCOPY  08/08/2016    Dr Zander Montemayor hernia, [place] Grade B reflux esophagitis    UPPER GASTROINTESTINAL ENDOSCOPY N/A 09/02/2021    Dr Helm Abed ulcerative gastritis, esophagitis, EOE?, no h pylori       Recent Hospitalizations      Significant Injuries      Family History   Problem Relation Age of Onset    Diabetes Mother     High Blood Pressure Mother     High Cholesterol Mother     Stroke Mother     Cancer Father         ?stomach     Other Sister         Blood CLots    Heart Disease Sister     Colon Cancer Brother     Pancreatic Cancer Brother     Colon Polyps Brother     Esophageal Cancer Neg Hx     Liver Disease Neg Hx     Stomach Cancer Neg Hx     Ulcerative Colitis Neg Hx        Social History  Social History     Tobacco Use   Smoking Status Never   Smokeless Tobacco Never     Social History     Substance and Sexual Activity   Alcohol Use Yes    Alcohol/week: 1.0 standard drink    Types: 1 Glasses of wine per week    Comment: occasional      Social History     Substance and Sexual Activity   Drug Use No         Current Outpatient Medications   Medication Sig Dispense Refill    pantoprazole (PROTONIX) 40 MG tablet Take 1 tablet by mouth daily 90 tablet 3    diphenoxylate-atropine (LOMOTIL) 2.5-0.025 MG per tablet Take 1 tablet by mouth 4 times daily as needed for Diarrhea.      vitamin D (CHOLECALCIFEROL) 25 MCG (1000 UT) TABS tablet Take 1,000 Units by mouth daily      Multiple Vitamins-Minerals (THERA M PLUS) TABS Take 1 tablet by mouth daily      vitamin B-12 (CYANOCOBALAMIN) 100 MCG tablet Take 100 mcg by mouth daily      Zinc 30 MG CAPS Take 1 capsule by mouth daily      loperamide (IMODIUM) 2 MG capsule Take 2 mg by mouth as needed       dicyclomine (BENTYL) 20 MG tablet Take 20 mg by mouth every 6 hours as needed        No current facility-administered medications for this visit. Allergies: Other and Prochlorperazine    REVIEW OF SYSTEMS     Constitutional: []Fever []Sweats []Chills [] Recent Injury   [x] Denies all unless marked  HENT:[]Headache  [] Head Injury  [] Sore Throat  [] Ear Pain  [] Dizziness [] Hearing Loss   [x] Denies all unless marked  Musculoskeletal: [] Arthralgia  [] Myalgias [] Muscle cramps  [] Muscle twitches   [x] Denies all unless marked   Spine:  [] Neck pain  [] Back pain  [] Sciaticia  [x] Denies all unless marked  Neurological:[] Visual Disturbance [] Double Vision [] Slurred Speech [] Trouble swallowing  [] Vertigo [] Tingling [] Numbness [] Weakness [] Loss of Balance   [] Loss of Consciousness [] Memory Loss [] Seizures  [x] Denies all unless marked  Psychiatric/Behavioral:[] Depression [] Anxiety  [x] Denies all unless marked  Sleep: []  Insomnia [] Sleep Disturbance [] Snoring [x] Restless Legs [] Daytime Sleepiness [x] Sleep Apnea  [] Denies all unless marked    The MA has completed the ROS with the patient. I have reviewed it in its' entirety with the patient and agree with the documentation. PHYSICAL EXAM  /73   Pulse 60   Ht 5' 5\" (1.651 m)   Wt 164 lb (74.4 kg)   SpO2 97%   BMI 27.29 kg/m²      Constitutional - No acute distress    HEENT- Conjunctiva normal.  No scars, masses, or lesions over external nose or ears, no neck masses noted, no jugular vein distension, no bruit  Cardiac- Regular rate and rhythm  Pulmonary- Clear to auscultation, good expansion, normal effort without use of accessory muscles  Musculoskeletal - No significant wasting of muscles noted, arthritic changes noted in her hands  Extremities - No clubbing, cyanosis or edema  Skin - Warm, dry, and intact.   No rash, erythema, or somnolence,  and motor vehicle accidents. Advised to abstain from driving or operating heavy machinery when drowsy and the use of respiratory suppressants. 2.  The following educational material has been included in this visit after visit summary for your review: MELVIN/PAP guidelines-Discussed with the patient and all questions fully answered. 3.  Order-supplies-[facility]  4. Continue CPAP  5.   Follow up in 1 year      Replinishible PAP Supplies, 1 year supply  Item HPCPS Code Frequency   Mask of choice  or  1 per 3 months   Nasal Mask cushion/pillows  or  2 per 30 days   Full Face Mask Interface  1 per 30 days   Headgear  1 per 6 months   Tubing, length of choice  or  1 per 3 months   Water Chamber  1 per 6 months   Chinstrap  1 per 6 months   Disposable Filters  2 per 30 days   Reusable Filters  1 per 6 months     Diagnoses:  Obstructive sleep apnea (G47.33)  Length of Need: Lifetime, 99    Ordering Provider: Malika Brito PA-C  NPI:  [id number]

## 2022-09-20 NOTE — LETTER
[facility] Neurology and Sleep Medicine  [address] [place] [address] [address], [address] [place] [address]  Phone [phone]  Fax [phone]             Re:  oSmmer Gavin    22  :  1955  Address: [address] [place] [address],[id number]       Replinishible PAP Supplies, 1 year supply  Item HPCPS Code Frequency   Mask of choice  or  1 per 3 months   Nasal Mask cushion/pillows  or  2 per 30 days   Full Face Mask Interface  1 per 30 days   Headgear  1 per 6 months   Tubing, length of choice  or  1 per 3 months   Water Chamber  1 per 6 months   Chinstrap  1 per 6 months   Disposable Filters  2 per 30 days   Reusable Filters  1 per 6 months     Diagnoses:  Obstructive sleep apnea (G47.33)  Length of Need: Lifetime, 99    Ordering Provider: Meryl Ramirez PA-C  NPI:  [id number]        Signature: [unfilled]        Date: 2022      Electronically Signed by Meryl Ramirez PA-C  on 2022 at 8:58 AM

## 2022-09-20 NOTE — PATIENT INSTRUCTIONS
Patient education: Sleep apnea in adults       INTRODUCTION -- Normally during sleep, air moves through the throat and in and out of the lungs at a regular rhythm. In a person with sleep apnea, air movement is periodically diminished or stopped. There are two types of sleep apnea: obstructive sleep apnea and central sleep apnea. In obstructive sleep apnea, breathing is abnormal because of narrowing or closure of the throat. In central sleep apnea, breathing is abnormal because of a change in the breathing control and rhythm. Sleep apnea is a serious condition that can affect a person's ability to safely perform normal daily activities and can affect long term health. Approximately 25 percent of adults are at risk for sleep apnea of some degree. Men are more commonly affected than women. Other risk factors include middle and older age, being overweight or obese, and having a small mouth and throat. This topic review focuses on the most common type of sleep apnea in adults, obstructive sleep apnea (MELVIN). HOW SLEEP APNEA OCCURS -- The throat is surrounded by muscles that control the airway for speaking, swallowing, and breathing. During sleep, these muscles are less active, and this causes the throat to narrow. In most people, this narrowing does not affect breathing. In others, it can cause snoring, sometimes with reduced or completely blocked airflow. A completely blocked airway without airflow is called an obstructive apnea. Partial obstruction with diminished airflow is called a hypopnea. A person may have apnea and hypopnea during sleep. Insufficient breathing due to apnea or hypopnea causes oxygen levels to fall and carbon dioxide to rise. Because the airway is blocked, breathing faster or harder does not help to improve oxygen levels until the airway is reopened. Typically, the obstruction requires the person to awaken to activate the upper airway muscles.  Once the airway is opened, the person then older age adults. ?Male sex - MELVIN is two times more common in men, especially in middle age. ?Obesity - The more obese a person is, the more likely he or she is to have MELVIN. ? Sedation from medication or alcohol - This interferes with the ability to awaken from sleep and can lengthen periods of apnea (no breathing), with potentially dangerous consequences. ? Abnormality of the airway. SLEEP APNEA CONSEQUENCES -- Complications of sleep apnea can include daytime sleepiness and difficulty concentrating. The consequence of this is an increased risk of accidents and errors in daily activities. Studies have shown that people with severe MELVIN are more than twice as likely to be involved in a motor vehicle accident as people without these conditions. People with MELVIN are encouraged to discuss options for driving, working, and performing other high-risk tasks with a healthcare provider. In addition, people with untreated MELVIN may have an increased risk of cardiovascular problems such as high blood pressure, heart attack, abnormal heart rhythms, or stroke. This risk may be due to changes in the heart rate and blood pressure that occur during sleep. SLEEP APNEA DIAGNOSIS -- The diagnosis of MELVIN is best made by a knowledgeable sleep medicine specialist who has an understanding of the individual's health issues. The diagnosis is usually based upon the person's medical history, physical examination, and testing, including:  ? A complaint of snoring and ineffective sleep  ? Neck size (greater than 16 inches in men or 14 inches in women) is associated with an increased risk of sleep apnea  ? A small upper airway: difficulty seeing the throat because of a tongue that is large for the mouth  ? High blood pressure, especially if it is resistant to treatment  ? If a bed partner has observed the patient during episodes of stopped breathing (apnea), choking, or gasping during sleep, there is a strong possibility of sleep apnea. Testing is usually performed in a sleep laboratory. A full sleep study is called a polysomnogram. The polysomnogram measures the breathing effort and airflow, blood oxygen level, heart rate and rhythm, duration of the various stages of sleep, body position, and movement of the arms/legs. Home monitoring devices are available that can perform a sleep study. This is a reasonable alternative to conventional testing in a sleep laboratory if the clinician strongly suspects moderate or severe sleep apnea and the patient does not have other illnesses or sleep disorders that may interfere with the results. SLEEP APNEA TREATMENT -- Sleep apnea is best treated by a knowledgeable sleep medicine specialist. The goal of treatment is to maintain an open airway during sleep. Effective treatment will eliminate the symptoms of sleep disturbance; long-term health consequences are also reduced. Most treatments require nightly use. The challenge for the clinician and the patient is to select an effective therapy that is appropriate for the patient's problem and that is acceptable for long term use. Auto-titrating CPAP delivers an amount of PAP that varies during the night. The variation is dependent on event detection software algorithms, which will increase the pressure gradually in response to flow changes until adequate patency is detected. After a period of sustained upper airway patency, the delivered level of pressure gradually decreases until the algorithm identifies recurrent upper airway obstruction, at which point the delivered pressure again increases. The result is that the delivered pressure varies throughout the night, in an effort to provide the lowest pressure that is necessary to maintain upper airway patency. Continuous positive airway pressure (CPAP) -- The most effective treatment for sleep apnea uses air pressure from a mechanical device to keep the upper airway open during sleep.  A CPAP (continuous positive airway pressure)  device uses an air-tight attachment to the nose, typically a mask, connected to a tube and a blower which generates the pressure. Devices that fit comfortably into the nasal opening, rather than over the nose, are also available. CPAP should be used any time the person sleeps (day or night). The CPAP device is usually used for the first time in the sleep lab, where a technician can adjust the pressure and select the best equipment to keep the airway open. Alternatively, an auto device with a self-adjusting pressure feature, provided with proper education and training, can get treatment started without another sleep test. While the treatment may seem uncomfortable, noisy, or bulky at first, most people accept the treatment after experiencing better sleep. However, difficulty with mask comfort and nasal congestion prevent up to 50 percent of people from using the treatment on a regular basis. Continued follow up with a healthcare provider helps to ensure that the treatment is effective and comfortable. Information from the CPAP machine is often used by physicians, therapists, and insurers to track the success of treatment. CPAP can be delivered with different features to improve comfort and solve problems that may come up during treatment. Changes in treatment may be needed if symptoms do not improve or if the persons condition changes, such as a gain or loss of weight. Adjust sleep position -- Adjusting sleep position (to stay off the back) may help improve sleep quality in people who have MELVIN when sleeping on the back. However, this is difficult to maintain throughout the night and is rarely an adequate solution. Weight loss -- Weight loss may be helpful for obese or overweight patients. Weight loss may be accomplished with dietary changes, exercise, and/or surgical treatment.  However, it can be difficult to maintain weight loss; the five-year success of non-surgical weight loss is only 5 percent, meaning that 95 percent of people regain lost weight. Avoid alcohol and other sedatives -- Alcohol can worsen sleepiness, potentially increasing the risk of accidents or injury. People with MELVIN are often counseled to drink little to no alcohol, even during the daytime. Similarly, people who take anti-anxiety medications or sedatives to sleep should speak with their healthcare provider about the safety of these medications. People with MELVIN must notify all healthcare providers, including surgeons, about their condition and the potential risks of being sedated. People with MELVIN who are given anesthesia and/or pain medications require special management and close monitoring to reduce the risk of a blocked airway. Dental devices -- A dental device, called an oral appliance or mandibular advancement device, can reposition the jaw (mandible), bringing the tongue and soft palate forward as well. This may relieve obstruction in some people. This treatment is excellent for reducing snoring, although the effect on MELVIN is sometimes more limited. As a result, dental devices are best used for mild cases of MELVIN when relief of snoring is the main goal. Failure to tolerate and accept CPAP is another indication for dental devices. While dental devices are not as effective as CPAP for MELVIN, some patients prefer a dental device to CPAP. Side effects of dental devices are generally minor but may include changes to the bite with prolonged use. Surgical treatment -- Surgery is an alternative therapy for patients who cannot tolerate or do not improve with nonsurgical treatments such as CPAP or oral devices. Surgery can also be used in combination with other nonsurgical treatments. Surgical procedures reshape structures in the upper airways or surgically reposition bone or soft tissue. Uvulopalatopharyngoplasty (UPPP) removes the uvula and excessive tissue in the throat, including the tonsils, if present. Other procedures, such as maxillomandibular advancement (MMA), address both the upper and lower pharyngeal airway more globally. UPPP alone has limited success rates (less than 50 percent) and people can relapse (when MELVIN symptoms return after surgery). As a result, this surgery is only recommended in a minority of people and should be considered with caution. MMA may have a higher success rate, particularly in people with abnormal jaw (maxilla and mandible) anatomy, but it is the most complicated procedure. A newer surgical approach, nerve stimulation to protrude the tongue, has promising success rates in very selected people. Tracheostomy creates a permanent opening in the neck. It is reserved for people with severe disease in whom less drastic measures have failed or are inappropriate. Although it is always successful in eliminating obstructive sleep apnea, tracheostomy requires significant lifestyle changes and carries some serious risks (eg, infection, bleeding, blockage). All surgical treatments require discussions about the goals of treatment, the expected outcomes, and potential complications. Hypoglossal nerve stimulator- \"Inspire\" device    PAP treatment failure:  Possible causes of treatment failure include nonadherence or suboptimal adherence, weight gain, an inappropriate level of prescribed positive pressure, or an additional disorder causing sleepiness (eg, narcolepsy) that may require alterations in the therapeutic regimen. A review of medications should also be undertaken since many drugs may lead to sleepiness. Inadequate sleep time may also negate the expected effects from treatment of MELVIN. Also, pt's can have persistent hypersomnolence associated with sleep apnea even in the presence of adequate therapy and at those times Provigil or Nuvigil or other stimulants may be indicated.     Once the patient's positive airway pressure therapy has been optimized and symptoms resolved, a regimen of long-term follow-up should be established. Annual visits are reasonable, with more frequent visits in between if new issues arise. The purpose of long-term follow-up is to assess usage and monitor for recurrent MELVIN, new side effects, air leakage, and fluctuations in body weight. WHERE TO GET MORE INFORMATION -- Your healthcare provider is the best source of information for questions and concerns related to your medical problem. Organizations  [employer] Association  Provides information about sleep apnea to the public, publishes a newsletter, and serves as an advocate for people with the disorder. [place], [address] [facility], [address] [facility]   [email or web address]   [email or web address].. org   Tel: [phone]   Fax: [facility] organization that works to [employer] and safety by promoting public understanding of sleep and sleep disorders. Supports sleep-related education, research, and advocacy; produces and distributes educational materials to the public and healthcare professionals; and offers postdoctoral fellowships and grants for sleep researchers. [address]   [email or web address]   [email or web address]   Tel: [phone]   Fax: [phone]    Important information:  Medicare/private insurance CPAP/BiPAP/APAP requirements:  Medicare/private insurance has specific requirements for PAP compliance that must be met during the first 90 days of use to continue coverage for CPAP/BiPAP/APAP  from day 91 and beyond. The policy requires that patients use a PAP device 4 hours per 24 hour period, at least 70% of the time over a 30 day period. This data must be downloaded as a report direct from the PAP devices. This is called a compliance download. Your PAP supplier will assist you in this matter.      Note:  Where applicable, we will utilize PAP device efficiency reports, additional testing, and face-to-face  clinical evaluation subsequent to any treatment, changes in treatment, and continued treatment. Caution:  Please abstain from driving or engaging in other activities which may be hazardous in the presence of diminished alertness or daytime drowsiness. And avoid the use of sedatives or alcohol, which can worsen sleep apnea and daytime drowsiness. Mask suggestions:  -     Resmed Airfit N20 (Nasal) or F20 (Full face mask). They conform to your face, thus decreasing the potential for mask leakage. You might like the AirTouch F20(full face mask). It has a \"memory foam\" like cushion. The AirFit F30 is a smaller style full face mask designed to sit low on and cover less of your face for fewer facial marks. AirFit N30i has a top of the head tube with a nasal mask. AirFit P10 reported to be the most comfortable nasal pillow mask. Resmed Mirage FX reported to be the most comfortable nasal mask. Resmed Mirage [place] reported to be the most comfortable hybrid mask. AirTouch N20-memory foam nasal mask. Respironics: You might also like to try a nasal mask called a Dreamwear nasal mask or the Dreamwear nasal pillow. Another suggestion is the [employer], it is a minimal contact full face mask. The Letty Chioma incredible under the nose design makes it the only full face mask that won't cause red marks on the bridge of your nose when compared to other full face masks. The Dreamwear full face mask has a  soft feel, unique in-frame air-flow, and innovative air tube connection at the top of the head for the ultimate in sleep comfort. Comfort Gel Blue. Dreamwear gel pillows. [employer]: Brevida nasal pillow mask and Simplus FFM    The use of a memory foam CPAP pillow supports the head and neck throughout the night.

## 2022-10-18 ENCOUNTER — TELEPHONE (OUTPATIENT)
Dept: OTHER | Age: 67
End: 2022-10-18

## 2022-10-18 DIAGNOSIS — Z80.0 FAMILY HISTORY OF PANCREATIC CANCER: Primary | ICD-10-CM

## 2022-10-18 NOTE — TELEPHONE ENCOUNTER
Patient was identified as possibly being a candidate for hereditary cancer genetic testing through a screener. I reached out to patient to talk more about this type of testing, why it's important, what it entails, and next steps if they were to have a positive gene mutation. Patient voiced understanding and agreed that they would like to move forward with testing. Patient was informed that results will take 2-3 weeks to complete and they will be set up with an appointment with me at that time to review those results. Patient also offered the choice of having a saliva kit sent to their home or coming into [facility] [place] out-patient lab to have sample collected.    Follow-up results disclosure appointment:  November 18, 2022

## 2022-11-18 ENCOUNTER — HOSPITAL ENCOUNTER (OUTPATIENT)
Dept: INFUSION THERAPY | Age: 67
Discharge: HOME | End: 2022-11-18

## 2022-11-18 NOTE — CONSULTS
Completed a post-test results disclosure discussion with patient. The patient's testing was Negative for a clinically actionable gene mutation. If riskScore was performed for the patient, it was reviewed in detail. We also reviewed family members that may benefit from testing. I directed the patient to follow up with their healthcare provider, for next steps regarding their healthcare. Patient is still considered at an elevated risk for Pancreatic Cancer due to her brother and sister both having been diagnosed. I spoke to patient about screening guidelines that she should consider moving forward. We spoke about EUS, MRCP, MRI, etc. I gave her some information about pancreatic clinics nationwide. I reached out to Dr. Abiola Nolasco and he will be seeing her in office to discuss high risk protocol options with patient. I will route this note to John Rand MD to keep her in the loop.

## 2023-01-03 ENCOUNTER — OFFICE VISIT (OUTPATIENT)
Dept: GASTROENTEROLOGY | Age: 68
End: 2023-01-03
Payer: MEDICARE

## 2023-01-03 VITALS
BODY MASS INDEX: 28.26 KG/M2 | HEIGHT: 65 IN | HEART RATE: 91 BPM | DIASTOLIC BLOOD PRESSURE: 80 MMHG | OXYGEN SATURATION: 98 % | SYSTOLIC BLOOD PRESSURE: 120 MMHG | WEIGHT: 169.6 LBS

## 2023-01-03 DIAGNOSIS — Z91.89 AT HIGH RISK FOR PANCREATIC CANCER: ICD-10-CM

## 2023-01-03 DIAGNOSIS — Z80.0 FAMILY HISTORY OF PANCREATIC CANCER: ICD-10-CM

## 2023-01-03 DIAGNOSIS — F40.240 CLAUSTROPHOBIA: Primary | ICD-10-CM

## 2023-01-03 PROCEDURE — G8484 FLU IMMUNIZE NO ADMIN: HCPCS | Performed by: INTERNAL MEDICINE

## 2023-01-03 PROCEDURE — G8417 CALC BMI ABV UP PARAM F/U: HCPCS | Performed by: INTERNAL MEDICINE

## 2023-01-03 PROCEDURE — 1036F TOBACCO NON-USER: CPT | Performed by: INTERNAL MEDICINE

## 2023-01-03 PROCEDURE — G8427 DOCREV CUR MEDS BY ELIG CLIN: HCPCS | Performed by: INTERNAL MEDICINE

## 2023-01-03 PROCEDURE — 1090F PRES/ABSN URINE INCON ASSESS: CPT | Performed by: INTERNAL MEDICINE

## 2023-01-03 PROCEDURE — G8400 PT W/DXA NO RESULTS DOC: HCPCS | Performed by: INTERNAL MEDICINE

## 2023-01-03 PROCEDURE — 3017F COLORECTAL CA SCREEN DOC REV: CPT | Performed by: INTERNAL MEDICINE

## 2023-01-03 PROCEDURE — 99214 OFFICE O/P EST MOD 30 MIN: CPT | Performed by: INTERNAL MEDICINE

## 2023-01-03 PROCEDURE — 1123F ACP DISCUSS/DSCN MKR DOCD: CPT | Performed by: INTERNAL MEDICINE

## 2023-01-03 RX ORDER — DIAZEPAM 10 MG/1
TABLET ORAL
Qty: 2 TABLET | Refills: 0 | Status: SHIPPED | OUTPATIENT
Start: 2023-01-03 | End: 2024-01-03

## 2023-01-03 RX ORDER — MONTELUKAST SODIUM 4 MG/1
1 TABLET, CHEWABLE ORAL 2 TIMES DAILY
Qty: 60 TABLET | Refills: 11 | Status: SHIPPED | OUTPATIENT
Start: 2023-01-03

## 2023-01-03 ASSESSMENT — ENCOUNTER SYMPTOMS
CONSTIPATION: 0
CHEST TIGHTNESS: 0
DIARRHEA: 1
NAUSEA: 0
TROUBLE SWALLOWING: 0
SORE THROAT: 0
BLOOD IN STOOL: 0
ALLERGIC/IMMUNOLOGIC NEGATIVE: 1
SHORTNESS OF BREATH: 0
RECTAL PAIN: 0
VOMITING: 0
EYES NEGATIVE: 1
COUGH: 0
VOICE CHANGE: 0
BACK PAIN: 0
ABDOMINAL PAIN: 0

## 2023-01-03 NOTE — PROGRESS NOTES
'    Skip Kimble  Primary Care Provider Beck Moon DO  Referral Source     Skip Kimble is a 75 y.o. Chief Complaint  Chief Complaint   Patient presents with    New Patient     Family h/o pancreatic cancer         ASSESSMENT/PLAN:  1. At high risk for pancreatic cancer  2. Family history of pancreatic cancer    - MRI ABDOMEN W WO CONTRAST MRCP; Future  - diazePAM (VALIUM) 10 MG tablet; Take 1 tablet 1 hour before procedure and second tablet upon arrival  Dispense: 2 tablet; Refill: 0    3. Claustrophobia for procedure    - diazePAM (VALIUM) 10 MG tablet; Take 1 tablet 1 hour before procedure and second tablet upon arrival  Dispense: 2 tablet; Refill: 0    4. Post Prandial Bloating/Loose Stools    -Discussed with patient the risk of pancreatic cancer is higher for her than average population. Specially given the fact that she is a pancreatic cancer in 2 first-degree relatives. She has no obvious identified genetic mutation. Current recommendations for \"familial pancreatic cancer screening\" includes alternating MRI with endoscopic ultrasound every 12 months. She is agreeable to pursue this. We will schedule an MRI as above. Patient does suffer from claustrophobia with MRIs in the past and requesting an open MRI and p.o. sedation    -In regards to her occasional nausea, bloating and diarrhea this is worse after she eats. 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 We discussed options including Xifaxan for possible small bowel overgrowth versus treatment with colestipol. I will send her a course of colestipol at 1 twice daily.    - F/u in 1 yr or sooner as needed.   -If MRI is normal, I would recommend an endoscopic ultrasound in 1 year.   She can be scheduled in the office sooner than 1 year if needed, but otherwise we will plan as above                      HPI    74 yo WF with family history of pancreatic cancer with cancer arising in her sister [de-identified]81 yo) and brother (early 63's). She has undergone genetic testing with no identified high risk abnormalities. Currently she is largely asymptomatic with no weight loss, jaundice or new DM. She does endorse some worsening post prandial bowel changes with bloating and loose stools. Her GB was removed years ago. No fever, no chills. No vomiting. Her symptoms have worsened over the last 2-3 weeks after she returned from a trip to [place]. She is s/p cholecystectomy and s/p appendectomy.        Past Medical History:   Diagnosis Date    Bell's palsy     Carotid occlusion, left     Chronic back pain     Colon polyp     CPAP (continuous positive airway pressure) dependence     7cm to 15.8cm    Depression     Diverticulitis 2006    DM (diabetes mellitus) (HCC)     GERD (gastroesophageal reflux disease)     H/O diverticulitis of colon     Hiatal hernia     Hyperlipidemia     Hypertension     Hypothyroid     Irritable bowel syndrome     Neuropathy     Obstructive sleep apnea     AHI:  35.3 per HST, 12/2016    Restless legs syndrome     Rheumatoid aortitis     Sleep apnea     c-pap    TIA (transient ischemic attack)     Dr Bria Bocanegra      Past Surgical History:   Procedure Laterality Date    APPENDECTOMY      CARDIAC CATHETERIZATION      CHOLECYSTECTOMY      COLONOSCOPY  12/10/2012    Dr Magi Kelley of sigmoid, AP    COLONOSCOPY  02/24/2017    Dr Moon Garcia x 1, BCM x 2, 5 yr recall    COLONOSCOPY N/A 09/02/2021    Dr Chilango Claros-Diverticular-BCM, 5 yr recall    COLONOSCOPY  05/07/2019    Dr Amparo Monte, 5 yr recall    ELBOW SURGERY Left 03/26/2015    HYSTERECTOMY (CERVIX STATUS UNKNOWN)      total    UPPER GASTROINTESTINAL ENDOSCOPY  08/08/2016    Dr Ron Thompson hernia, [place] Grade B reflux esophagitis    UPPER GASTROINTESTINAL ENDOSCOPY N/A 09/02/2021    Dr Janee Michel ulcerative gastritis, esophagitis, EOE?, no h pylori      Family History   Problem Relation Age of Onset    Diabetes Mother     High Blood Pressure Mother     High Cholesterol Mother     Stroke Mother     Liver Cancer Father 70    Other Sister         Blood CLots    Heart Disease Sister     Pancreatic Cancer Sister 80    Pancreatic Cancer Brother 58    Colon Polyps Brother     Stomach Cancer Paternal Grandmother 78    Stomach Cancer Paternal Cousin     Esophageal Cancer Neg Hx     Liver Disease Neg Hx     Ulcerative Colitis Neg Hx       Current Outpatient Medications   Medication Sig Dispense Refill    pantoprazole (PROTONIX) 40 MG tablet Take 1 tablet by mouth daily 90 tablet 3    diphenoxylate-atropine (LOMOTIL) 2.5-0.025 MG per tablet Take 1 tablet by mouth 4 times daily as needed for Diarrhea.      vitamin D (CHOLECALCIFEROL) 25 MCG (1000 UT) TABS tablet Take 1,000 Units by mouth daily      Multiple Vitamins-Minerals (THERA M PLUS) TABS Take 1 tablet by mouth daily      vitamin B-12 (CYANOCOBALAMIN) 100 MCG tablet Take 100 mcg by mouth daily      Zinc 30 MG CAPS Take 1 capsule by mouth daily      loperamide (IMODIUM) 2 MG capsule Take 2 mg by mouth as needed       dicyclomine (BENTYL) 20 MG tablet Take 20 mg by mouth every 6 hours as needed  (Patient not taking: Reported on 1/3/2023)       No current facility-administered medications for this visit. Allergies   Allergen Reactions    Other Swelling and Other (See Comments)     Combid stomach medicine    Prochlorperazine Nausea And Vomiting         Review of Systems   Constitutional:  Negative for appetite change, fever and unexpected weight change. HENT:  Negative for sore throat, trouble swallowing and voice change. Eyes: Negative. Respiratory:  Negative for cough, chest tightness and shortness of breath. Cardiovascular:  Negative for chest pain, palpitations and leg swelling. Gastrointestinal:  Positive for diarrhea. Negative for abdominal pain, blood in stool, constipation, nausea, rectal pain and vomiting.         Bloating   Genitourinary:  Negative for dysuria, flank pain and hematuria. Musculoskeletal:  Negative for arthralgias, back pain and gait problem. Skin:  Negative for pallor, rash and wound. Allergic/Immunologic: Negative. Neurological:  Negative for dizziness, tremors, weakness and light-headedness. Hematological: Negative. Psychiatric/Behavioral:  Negative for confusion and dysphoric mood. The patient is not nervous/anxious. Physical Exam  Constitutional:       General: She is not in acute distress. Appearance: Normal appearance. She is well-developed. Comments: /80 (Site: Left Upper Arm, Position: Sitting, Cuff Size: Medium Adult)   Pulse 91   Ht 5' 5\" (1.651 m)   Wt 169 lb 9.6 oz (76.9 kg)   SpO2 98%   BMI 28.22 kg/m²    HENT:      Head: Normocephalic and atraumatic. No abrasion or contusion. Eyes:      General: Lids are normal. No scleral icterus. Conjunctiva/sclera: Conjunctivae normal.      Pupils: Pupils are equal, round, and reactive to light. Neck:      Trachea: Trachea normal. No tracheal deviation. Cardiovascular:      Rate and Rhythm: Normal rate and regular rhythm. Heart sounds: Normal heart sounds. No murmur heard. No friction rub. No gallop. Pulmonary:      Effort: Pulmonary effort is normal. No accessory muscle usage or respiratory distress. Breath sounds: Normal breath sounds. Chest:      Chest wall: No deformity or tenderness. Abdominal:      General: Bowel sounds are normal. There is no distension. Palpations: Abdomen is soft. There is no hepatomegaly or mass. Tenderness: There is no abdominal tenderness. There is no guarding or rebound. Musculoskeletal:         General: Normal range of motion. Cervical back: Normal range of motion and neck supple. Right ankle: No swelling. Left ankle: No swelling. Lymphadenopathy:      Cervical: No cervical adenopathy. Upper Body:      Right upper body: No supraclavicular adenopathy.       Left upper body: No supraclavicular adenopathy. Skin:     General: Skin is warm and dry. Coloration: Skin is not pale. Findings: No bruising, lesion or rash. Nails: There is no clubbing. Neurological:      Mental Status: She is alert and oriented to person, place, and time. Gait: Gait normal.   Psychiatric:         Speech: Speech normal.         Behavior: Behavior normal. Behavior is cooperative. Labs:  No visits with results within 1 Month(s) from this visit. Latest known visit with results is:   Orders Only on 10/18/2022   Component Date Value Ref Range Status    Report Summary 11/02/2022 NEGATIVE   Final    Footnotes 11/02/2022 See Notes   Final       Greater than 35 minutes spent on this encounter, >50% of which was face-to-face with patient regarding counseling and discussion of treatment. Total time includes but is not limited to reviewing referral notes, reviewing labs/imaging and notes both in our EMR and through outside records, evaluating and examining the patient, discussing and formulating treatment, and writing the note.      (Please note that portions of this note were completed with a voice recognition program. Efforts were made to edit the dictations but occasionally words are mis-transcribed.)

## 2023-01-06 ENCOUNTER — TELEPHONE (OUTPATIENT)
Dept: GASTROENTEROLOGY | Age: 68
End: 2023-01-06

## 2023-01-06 NOTE — TELEPHONE ENCOUNTER
[facility] called back and said they do have one open MRI machine.

## 2023-01-06 NOTE — TELEPHONE ENCOUNTER
I called pt back and offered this and she said that her and Dr Damian Groves already discussed the medications prior, but she is still hesitant about the closed MRI machine, even with medication. She asked if she could take the weekend to think about it and then call us back.

## 2023-01-06 NOTE — TELEPHONE ENCOUNTER
Dr Sierra Gill wanted pt to have an MRI done at [facility]. I sent a message to Dimas Patel to get a PA done and there is no PA required. I called the patient to get scheduled at [facility], but she said she is claustrophobic and requested an open MRI machine. I spoke to [facility] Scheduling Dept and they said they no longer have an open MRI machine. I then called [address]  (Room 103) and they said they think [facility] is [facility], so they had me call there. I called [phone], but had to leave a vm. I'm waiting to hear back from them. The patient requested that I call [facility], so I called them and they said no, they don't have an open MRI machine either. I told the pt lets wait and see what  says and then go from there. I told her we will call her back.

## 2023-01-06 NOTE — TELEPHONE ENCOUNTER
She can take the weekend and think about it. [facility] is the preferred location for this test though.

## 2023-01-06 NOTE — TELEPHONE ENCOUNTER
Emy,    No it doesn't need to be done here. If patient is agreeable, Dr Fabby Victoria can write her medication to take prior to help. She will need a  though.

## 2023-01-09 NOTE — TELEPHONE ENCOUNTER
Pt called back today and said that she is ok with either [facility] or [address] [place] [address], but only if its at the [address] [place] [address]. She said that Dr Caron Peralta wrote to have the MRI done at [address] [place] [address] because that's where she requested to have it done at first.    She said that she had done a MRI at [address] [place] [address] before and remembers it being ok, but I wonder if its when they had their open machine. After we got off the phone, I called [facility] and they do MRI's at both the hospital and the [address] [place] [address], but they are both closed machines. Please, talk with Dr Caron Peralta if needed, but she leaned more towards [facility]. Once we have a location, i'll send Maryjane Mejia a message to do a PA.

## 2023-01-09 NOTE — TELEPHONE ENCOUNTER
[facility] only does the MRCP test in the hospital and it's not an open machine. If patient is reluctant to do that, [facility] is ok, but it's just not his preference.

## 2023-01-10 NOTE — TELEPHONE ENCOUNTER
Pt is fine with [facility] due to provider preference. I will send a PA request [facility].

## 2023-01-11 ENCOUNTER — TRANSCRIBE ORDERS (OUTPATIENT)
Dept: ADMINISTRATIVE | Facility: HOSPITAL | Age: 68
End: 2023-01-11
Payer: MEDICARE

## 2023-01-11 DIAGNOSIS — Z80.0 FAMILY HISTORY OF PANCREATIC CANCER: ICD-10-CM

## 2023-01-11 DIAGNOSIS — Z91.89 AT HIGH RISK FOR PANCREATIC CANCER: Primary | ICD-10-CM

## 2023-01-20 ENCOUNTER — HOSPITAL ENCOUNTER (OUTPATIENT)
Dept: MRI IMAGING | Facility: HOSPITAL | Age: 68
Discharge: HOME | End: 2023-01-20
Admitting: INTERNAL MEDICINE
Payer: MEDICARE

## 2023-01-20 DIAGNOSIS — Z91.89 AT HIGH RISK FOR PANCREATIC CANCER: ICD-10-CM

## 2023-01-20 DIAGNOSIS — Z80.0 FAMILY HISTORY OF PANCREATIC CANCER: ICD-10-CM

## 2023-01-20 LAB — CREAT BLDA-MCNC: 0.7 MG/DL (ref 0.6–1.3)

## 2023-01-20 PROCEDURE — 82565 ASSAY OF CREATININE: CPT

## 2023-01-20 PROCEDURE — A9577 INJ MULTIHANCE: HCPCS | Performed by: INTERNAL MEDICINE

## 2023-01-20 PROCEDURE — 0 GADOBENATE DIMEGLUMINE 529 MG/ML SOLUTION: Performed by: INTERNAL MEDICINE

## 2023-01-20 PROCEDURE — 74183 MRI ABD W/O CNTR FLWD CNTR: CPT

## 2023-01-20 RX ADMIN — GADOBENATE DIMEGLUMINE 15 ML: 529 INJECTION, SOLUTION INTRAVENOUS at 08:28

## 2023-01-23 ENCOUNTER — TELEPHONE (OUTPATIENT)
Dept: GASTROENTEROLOGY | Age: 68
End: 2023-01-23

## 2023-01-23 NOTE — TELEPHONE ENCOUNTER
Pt has been advised of results and recommendations. Recall added. MRI looks good. Repeat screening with an EUS in 1 yr. Re: high risk pancreatic Cancer screening      ----- Message -----   From: Robyn Ford MA   Sent: 1/20/2023  10:03 AM CST   To: Nimco Liu MD   Subject: Edit                                               The scan below was edited by MADDI Norman on 1/20/2023 at 10:03 AM; it is attached to the following: MRI ABDOMEN [address] [facility] on 1/20/2023.

## 2023-02-17 ENCOUNTER — TELEPHONE (OUTPATIENT)
Dept: VASCULAR SURGERY | Facility: CLINIC | Age: 68
End: 2023-02-17
Payer: MEDICARE

## 2023-02-17 NOTE — TELEPHONE ENCOUNTER
Called Pt to remind them of their testing appt at the [facility] at 8 o'clock with arrival time at 7:30. Also reminded Pt of their 1 yr f/u appt at 10:30. Pt confirmed both appt.

## 2023-02-20 ENCOUNTER — OFFICE VISIT (OUTPATIENT)
Dept: VASCULAR SURGERY | Facility: CLINIC | Age: 68
End: 2023-02-20
Payer: MEDICARE

## 2023-02-20 ENCOUNTER — HOSPITAL ENCOUNTER (OUTPATIENT)
Dept: ULTRASOUND IMAGING | Facility: HOSPITAL | Age: 68
Discharge: HOME | End: 2023-02-20
Admitting: NURSE PRACTITIONER
Payer: MEDICARE

## 2023-02-20 VITALS
SYSTOLIC BLOOD PRESSURE: 122 MMHG | BODY MASS INDEX: 27.49 KG/M2 | HEART RATE: 80 BPM | WEIGHT: 165 LBS | HEIGHT: 65 IN | DIASTOLIC BLOOD PRESSURE: 74 MMHG

## 2023-02-20 DIAGNOSIS — I65.23 BILATERAL CAROTID ARTERY STENOSIS: Primary | ICD-10-CM

## 2023-02-20 DIAGNOSIS — I10 ESSENTIAL HYPERTENSION: ICD-10-CM

## 2023-02-20 DIAGNOSIS — I65.23 BILATERAL CAROTID ARTERY STENOSIS: ICD-10-CM

## 2023-02-20 DIAGNOSIS — E78.5 DYSLIPIDEMIA: ICD-10-CM

## 2023-02-20 PROCEDURE — 99214 OFFICE O/P EST MOD 30 MIN: CPT | Performed by: NURSE PRACTITIONER

## 2023-02-20 PROCEDURE — 93880 EXTRACRANIAL BILAT STUDY: CPT

## 2023-02-20 PROCEDURE — 93880 EXTRACRANIAL BILAT STUDY: CPT | Performed by: SURGERY

## 2023-02-20 NOTE — PROGRESS NOTES
"2/20/2023       Collette Xiong,   [address]  [facility] [address]    Edie Oconnor  1955    Chief Complaint   Patient presents with   • Follow-up     1 year follow up w/ testing. Last seen 3/11/22. Patient complains of occasional dizziness.        Dear Collette Xiong,        HPI  I had the pleasure of seeing your patient Edie Oconnor in the office today.  As you recall, Edie Oconnor is a 67 y.o.  female who we are following for asymptomatic carotid occlusive disease.  Currently she is doing well and denies any strokelike symptoms.  She is no longer taking aspirin.  She does have previous history of TIA.  They do not recommend she continue aspirin due to stomach issues.  She has had 2 siblings die from pancreatic cancer and is being monitored closely for this.  She did have noninvasive testing performed today, which I did review in office.      Review of Systems   Constitutional: Negative.    HENT: Negative.    Eyes: Negative.    Respiratory: Negative.    Cardiovascular: Negative.    Gastrointestinal: Negative.    Endocrine: Negative.    Genitourinary: Negative.    Musculoskeletal: Negative.    Skin: Negative.    Allergic/Immunologic: Negative.    Neurological: Positive for dizziness.   Hematological: Negative.    Psychiatric/Behavioral: Negative.    All other systems reviewed and are negative.        /74   Pulse 80   Ht 165.1 cm (65\")   Wt 74.8 kg (165 lb)   BMI 27.46 kg/m²   Physical Exam  Vitals and nursing note reviewed.   Constitutional:       General: She is not in acute distress.     Appearance: Normal appearance. She is well-developed and overweight. She is not diaphoretic.   HENT:      Head: Normocephalic and atraumatic.   Eyes:      General: No scleral icterus.     Pupils: Pupils are equal, round, and reactive to light.   Neck:      Thyroid: No thyromegaly.      Vascular: No carotid bruit or JVD.   Cardiovascular:      Rate and Rhythm: Normal rate and " regular rhythm.      Pulses: Normal pulses.      Heart sounds: Normal heart sounds and S2 normal. No murmur heard.    No friction rub. No gallop.   Pulmonary:      Effort: Pulmonary effort is normal.      Breath sounds: Normal breath sounds.   Abdominal:      General: Bowel sounds are normal.      Palpations: Abdomen is soft.   Musculoskeletal:         General: Normal range of motion.      Cervical back: Normal range of motion and neck supple.   Skin:     General: Skin is warm and dry.   Neurological:      General: No focal deficit present.      Mental Status: She is alert and oriented to person, place, and time.      Cranial Nerves: No cranial nerve deficit.   Psychiatric:         Mood and Affect: Mood normal.         Behavior: Behavior normal. Behavior is cooperative.         Thought Content: Thought content normal.         Judgment: Judgment normal.              Diagnostic Data:  Noninvasive testing including a carotid duplex shows less than 50% carotid stenosis bilaterally with bilateral antegrade vertebral flow.    Patient Active Problem List   Diagnosis   • Transient cerebral ischemia   • LAN on CPAP   • Dyslipidemia   • Essential hypertension   • Abnormal CT of the abdomen   • Colitis   • Nonsmoker   • Obesity, unspecified obesity severity, unspecified obesity type   • Carotid artery stenosis   • CPAP (continuous positive airway pressure) dependence   • Diarrhea   • Dyspepsia   • Gastric reflux syndrome   • Injury of shoulder and upper arm   • Open fracture of proximal phalanx of index finger   • Pain in elbow   • Restless leg syndrome         ICD-10-CM ICD-9-CM   1. Bilateral carotid artery stenosis  I65.23 433.10     433.30   2. Essential hypertension  I10 401.9   3. Dyslipidemia  E78.5 272.4       Plan: After thoroughly evaluating Edie Oconnor, I believe the best course of action is to remain conservative from vascular surgery standpoint.  Currently she is doing well and denies any strokelike symptoms.   I did review her testing which shows less than 50% carotid stenosis bilaterally.  We will see her back in 1 year with repeat noninvasive testing for continued surveillance, including a carotid duplex.   I did discuss vascular risk factors as they pertain to the progression of vascular disease including controlling her hypertension and dyslipidemia.  Her blood pressure stable on her current medication.  Her PCP is monitoring her dyslipidemia. The patient can continue taking their current medication regimen as previously planned.  This was all discussed in full with complete understanding.    Thank you for allowing me to participate in the care of your patient.  Please do not hesitate with any questions or concerns.  I will keep you aware of any further encounters with Edie Oconnor.        Sincerely yours,         JONEL Mclean

## 2023-03-06 LAB
ADENOVIRUS F 40 41 PCR: NOT DETECTED
ALBUMIN SERPL-MCNC: 4.7 G/DL (ref 3.5–5.2)
ALP BLD-CCNC: 95 U/L (ref 35–104)
ALT SERPL-CCNC: 28 U/L (ref 5–33)
AMYLASE: 29 U/L (ref 28–100)
ANION GAP SERPL CALCULATED.3IONS-SCNC: 9 MMOL/L (ref 7–19)
AST SERPL-CCNC: 30 U/L (ref 5–32)
ASTROVIRUS PCR: NOT DETECTED
BASOPHILS ABSOLUTE: 0 K/UL (ref 0–0.2)
BASOPHILS RELATIVE PERCENT: 0.4 % (ref 0–1)
BILIRUB SERPL-MCNC: 1.4 MG/DL (ref 0.2–1.2)
BUN BLDV-MCNC: 11 MG/DL (ref 8–23)
C-REACTIVE PROTEIN: <0.3 MG/DL (ref 0–0.5)
CALCIUM SERPL-MCNC: 10 MG/DL (ref 8.8–10.2)
CAMPYLOBACTER PCR: NOT DETECTED
CHLORIDE BLD-SCNC: 105 MMOL/L (ref 98–111)
CLOSTRIDIUM DIFFICILE, PCR: NOT DETECTED
CO2: 28 MMOL/L (ref 22–29)
CREAT SERPL-MCNC: 0.6 MG/DL (ref 0.5–0.9)
CRYPTOSPORIDIUM PCR: NOT DETECTED
CYCLOSPORA CAYETANENSIS PCR: NOT DETECTED
E COLI ENTEROAGGREGATIVE PCR: NOT DETECTED
E COLI ENTEROPATHOGENIC PCR: NOT DETECTED
E COLI ENTEROTOXIGENIC PCR: NOT DETECTED
E COLI SHIGELLA/ENTEROINVASIVE PCR: NOT DETECTED
ENTAMOEBA HISTOLYTICA PCR: NOT DETECTED
EOSINOPHILS ABSOLUTE: 0.1 K/UL (ref 0–0.6)
EOSINOPHILS RELATIVE PERCENT: 1.2 % (ref 0–5)
GFR SERPL CREATININE-BSD FRML MDRD: >60 ML/MIN/{1.73_M2}
GIARDIA LAMBLIA PCR: NOT DETECTED
GLUCOSE BLD-MCNC: 121 MG/DL (ref 74–109)
HCT VFR BLD CALC: 47.5 % (ref 37–47)
HEMOGLOBIN: 15.7 G/DL (ref 12–16)
IMMATURE GRANULOCYTES #: 0 K/UL
LIPASE: 21 U/L (ref 13–60)
LYMPHOCYTES ABSOLUTE: 2 K/UL (ref 1.1–4.5)
LYMPHOCYTES RELATIVE PERCENT: 24.2 % (ref 20–40)
MCH RBC QN AUTO: 30.2 PG (ref 27–31)
MCHC RBC AUTO-ENTMCNC: 33.1 G/DL (ref 33–37)
MCV RBC AUTO: 91.3 FL (ref 81–99)
MONOCYTES ABSOLUTE: 0.7 K/UL (ref 0–0.9)
MONOCYTES RELATIVE PERCENT: 8.7 % (ref 0–10)
NEUTROPHILS ABSOLUTE: 5.3 K/UL (ref 1.5–7.5)
NEUTROPHILS RELATIVE PERCENT: 65.3 % (ref 50–65)
NOROVIRUS GI GII PCR: NOT DETECTED
PDW BLD-RTO: 13 % (ref 11.5–14.5)
PLATELET # BLD: 272 K/UL (ref 130–400)
PLESIOMONAS SHIGELLOIDES PCR: NOT DETECTED
PMV BLD AUTO: 9.7 FL (ref 9.4–12.3)
POTASSIUM SERPL-SCNC: 4.4 MMOL/L (ref 3.5–5)
RBC # BLD: 5.2 M/UL (ref 4.2–5.4)
ROTAVIRUS A PCR: NOT DETECTED
SALMONELLA PCR: NOT DETECTED
SAPOVIRUS PCR: NOT DETECTED
SHIGA-LIKE TOXIN-PRODUCING E. COLI (STEC) STX1/STX2: NOT DETECTED
SODIUM BLD-SCNC: 142 MMOL/L (ref 136–145)
TOTAL PROTEIN: 7.8 G/DL (ref 6.6–8.7)
VIBRIO CHOLERAE PCR: NOT DETECTED
VIBRIO PCR: NOT DETECTED
WBC # BLD: 8.1 K/UL (ref 4.8–10.8)
YERSINIA ENTEROCOLITICA PCR: NOT DETECTED

## 2023-03-08 ENCOUNTER — HOSPITAL ENCOUNTER (OUTPATIENT)
Dept: CT IMAGING | Age: 68
Discharge: HOME | End: 2023-03-08
Payer: MEDICARE

## 2023-03-08 ENCOUNTER — OFFICE VISIT (OUTPATIENT)
Dept: GASTROENTEROLOGY | Age: 68
End: 2023-03-08
Payer: MEDICARE

## 2023-03-08 VITALS
WEIGHT: 163 LBS | HEIGHT: 65 IN | BODY MASS INDEX: 27.16 KG/M2 | OXYGEN SATURATION: 97 % | HEART RATE: 71 BPM | SYSTOLIC BLOOD PRESSURE: 120 MMHG | DIASTOLIC BLOOD PRESSURE: 80 MMHG

## 2023-03-08 DIAGNOSIS — R10.9 ACUTE ABDOMINAL PAIN: Primary | ICD-10-CM

## 2023-03-08 DIAGNOSIS — R19.7 DIARRHEA, UNSPECIFIED TYPE: ICD-10-CM

## 2023-03-08 DIAGNOSIS — R10.9 ACUTE ABDOMINAL PAIN: ICD-10-CM

## 2023-03-08 DIAGNOSIS — K62.5 RECTAL BLEEDING: ICD-10-CM

## 2023-03-08 PROCEDURE — 99214 OFFICE O/P EST MOD 30 MIN: CPT | Performed by: NURSE PRACTITIONER

## 2023-03-08 PROCEDURE — 1123F ACP DISCUSS/DSCN MKR DOCD: CPT | Performed by: NURSE PRACTITIONER

## 2023-03-08 PROCEDURE — 1090F PRES/ABSN URINE INCON ASSESS: CPT | Performed by: NURSE PRACTITIONER

## 2023-03-08 PROCEDURE — G8484 FLU IMMUNIZE NO ADMIN: HCPCS | Performed by: NURSE PRACTITIONER

## 2023-03-08 PROCEDURE — 6360000004 HC RX CONTRAST MEDICATION: Performed by: NURSE PRACTITIONER

## 2023-03-08 PROCEDURE — 74177 CT ABD & PELVIS W/CONTRAST: CPT

## 2023-03-08 PROCEDURE — G8400 PT W/DXA NO RESULTS DOC: HCPCS | Performed by: NURSE PRACTITIONER

## 2023-03-08 PROCEDURE — 1036F TOBACCO NON-USER: CPT | Performed by: NURSE PRACTITIONER

## 2023-03-08 PROCEDURE — G8427 DOCREV CUR MEDS BY ELIG CLIN: HCPCS | Performed by: NURSE PRACTITIONER

## 2023-03-08 PROCEDURE — 3017F COLORECTAL CA SCREEN DOC REV: CPT | Performed by: NURSE PRACTITIONER

## 2023-03-08 PROCEDURE — G8417 CALC BMI ABV UP PARAM F/U: HCPCS | Performed by: NURSE PRACTITIONER

## 2023-03-08 RX ORDER — DOXYCYCLINE HYCLATE 20 MG
1 TABLET ORAL PRN
COMMUNITY

## 2023-03-08 RX ADMIN — IOPAMIDOL 75 ML: 755 INJECTION, SOLUTION INTRAVENOUS at 11:26

## 2023-03-08 ASSESSMENT — ENCOUNTER SYMPTOMS
CHOKING: 0
ANAL BLEEDING: 1
SHORTNESS OF BREATH: 0
TROUBLE SWALLOWING: 0
ABDOMINAL PAIN: 1
ABDOMINAL DISTENTION: 0
BLOOD IN STOOL: 0
CONSTIPATION: 0
NAUSEA: 1
COUGH: 0
DIARRHEA: 1
VOMITING: 0
RECTAL PAIN: 0

## 2023-03-08 NOTE — PROGRESS NOTES
Subjective:     Patient ID: Tanisha Bone is a 68 y.o. female  PCP: Ludmila Agrawal DO  Referring Provider: Vernell Lei PA-C    HPI  Patient presents to the office today with the following complaints: Rectal Bleeding      Pt seen today for c/o rectal bleeding.  She reports waking up Monday morning with severe abdominal cramping and diarrhea.  She took Lomotil at that time.  Pain became worse with diarrhea.  She states she passed a \"cup full of blood\" in toilet.  Today, pt states the diarrhea has improved.  She continues to c/o abdominal pain and back pain.  GI panel was negative.  Pain located in periumbilical area and lower back.  Denies any fever.  She also c/o nausea, denies any vomiting.             Last EGD - esophagitis, ulcerative gastritis, (-) Gorman's  Last Colonoscopy 9/2021 - normal, 5 year recall  Family hx colon cancer and colon polyps - Brother    Assessment:     1. Acute abdominal pain    2. Diarrhea, unspecified type    3. Rectal bleeding            Plan:   - Schedule CT abd/pelvis today if possible  - Consider Colonoscopy pending results.   - Call with any questions or concerns       Orders  Orders Placed This Encounter   Procedures    CT ABDOMEN PELVIS W IV CONTRAST Additional Contrast? Oral     Standing Status:   Future     Number of Occurrences:   1     Standing Expiration Date:   3/8/2024     Order Specific Question:   Additional Contrast?     Answer:   Oral     Order Specific Question:   STAT Creatinine as needed:     Answer:   No     Medications  No orders of the defined types were placed in this encounter.        Patient History:     Past Medical History:   Diagnosis Date    Bell's palsy     Carotid occlusion, left     Chronic back pain     Colon polyp     CPAP (continuous positive airway pressure) dependence     7cm to 15.8cm    Depression     Diverticulitis 2006    DM (diabetes mellitus) (HCC)     GERD (gastroesophageal reflux disease)     H/O diverticulitis of colon      Hiatal hernia     Hyperlipidemia     Hypertension     Hypothyroid     Irritable bowel syndrome     Neuropathy     Obstructive sleep apnea     AHI:  35.3 per HST, 12/2016    Restless legs syndrome     Rheumatoid aortitis     Sleep apnea     c-pap    TIA (transient ischemic attack)     Dr Bandar Jasso       Past Surgical History:   Procedure Laterality Date    APPENDECTOMY      CARDIAC CATHETERIZATION      CHOLECYSTECTOMY      COLONOSCOPY  12/10/2012    Dr Che Patient of sigmoid, AP    COLONOSCOPY  02/24/2017    Dr Roz Baker x 1, BCM x 2, 5 yr recall    COLONOSCOPY N/A 09/02/2021    Dr Jesus ClarosEesdo-Anefdijvpjhj-CJY, 5 yr recall    COLONOSCOPY  05/07/2019    Dr Chas Ny, 5 yr recall    ELBOW SURGERY Left 03/26/2015    HYSTERECTOMY (CERVIX STATUS UNKNOWN)      total    UPPER GASTROINTESTINAL ENDOSCOPY  08/08/2016    Dr Ella Bradshaw hernia, [place] Grade B reflux esophagitis    UPPER GASTROINTESTINAL ENDOSCOPY N/A 09/02/2021    Dr Lakesha George ulcerative gastritis, esophagitis, EOE?, no h pylori       Family History   Problem Relation Age of Onset    Diabetes Mother     High Blood Pressure Mother     High Cholesterol Mother     Stroke Mother     Liver Cancer Father 70    Other Sister         Blood CLots    Heart Disease Sister     Pancreatic Cancer Sister 80    Pancreatic Cancer Brother 58    Colon Polyps Brother     Stomach Cancer Paternal Grandmother 78    Stomach Cancer Paternal Cousin     Esophageal Cancer Neg Hx     Liver Disease Neg Hx     Ulcerative Colitis Neg Hx        Social History     Socioeconomic History    Marital status:    Tobacco Use    Smoking status: Never    Smokeless tobacco: Never   Vaping Use    Vaping Use: Never used   Substance and Sexual Activity    Alcohol use:  Yes     Alcohol/week: 1.0 standard drink     Types: 1 Glasses of wine per week     Comment: occasional     Drug use: No       Current Outpatient Medications   Medication Sig Dispense Refill    doxycycline hyclate (PERIOSTAT) 20 MG tablet 1 tablet as needed      colestipol (COLESTID) 1 g tablet Take 1 tablet by mouth 2 times daily 60 tablet 11    pantoprazole (PROTONIX) 40 MG tablet Take 1 tablet by mouth daily 90 tablet 3    diphenoxylate-atropine (LOMOTIL) 2.5-0.025 MG per tablet Take 1 tablet by mouth 4 times daily as needed for Diarrhea.      vitamin D (CHOLECALCIFEROL) 25 MCG (1000 UT) TABS tablet Take 1,000 Units by mouth daily      Multiple Vitamins-Minerals (THERA M PLUS) TABS Take 1 tablet by mouth daily      vitamin B-12 (CYANOCOBALAMIN) 100 MCG tablet Take 100 mcg by mouth daily      Zinc 30 MG CAPS Take 1 capsule by mouth daily      loperamide (IMODIUM) 2 MG capsule Take 2 mg by mouth as needed       dicyclomine (BENTYL) 20 MG tablet Take 20 mg by mouth every 6 hours as needed      diazePAM (VALIUM) 10 MG tablet Take 1 tablet 1 hour before procedure and second tablet upon arrival (Patient not taking: Reported on 3/8/2023) 2 tablet 0     No current facility-administered medications for this visit. Allergies   Allergen Reactions    Other Swelling and Other (See Comments)     Combid stomach medicine    Prochlorperazine Nausea And Vomiting       Review of Systems   Constitutional:  Negative for activity change, appetite change, fatigue, fever and unexpected weight change. HENT:  Negative for trouble swallowing. Respiratory:  Negative for cough, choking and shortness of breath. Cardiovascular:  Negative for chest pain. Gastrointestinal:  Positive for abdominal pain, anal bleeding, diarrhea and nausea. Negative for abdominal distention, blood in stool, constipation, rectal pain and vomiting. Allergic/Immunologic: Negative for food allergies. All other systems reviewed and are negative. Objective:     /80 (Site: Left Upper Arm)   Pulse 71   Ht 5' 5\" (1.651 m)   Wt 163 lb (73.9 kg)   SpO2 97%   BMI 27.12 kg/m²     Physical Exam  Vitals reviewed.    Constitutional:       General: She is not in acute distress. Appearance: She is well-developed. HENT:      Head: Normocephalic and atraumatic. Right Ear: External ear normal.      Left Ear: External ear normal.      Nose: Nose normal.   Eyes:      General: No scleral icterus. Right eye: No discharge. Left eye: No discharge. Conjunctiva/sclera: Conjunctivae normal.      Pupils: Pupils are equal, round, and reactive to light. Cardiovascular:      Rate and Rhythm: Normal rate and regular rhythm. Heart sounds: Normal heart sounds. No murmur heard. Pulmonary:      Effort: Pulmonary effort is normal. No respiratory distress. Breath sounds: Normal breath sounds. No wheezing or rales. Abdominal:      General: Bowel sounds are normal. There is no distension. Palpations: Abdomen is soft. There is no mass. Tenderness: There is generalized abdominal tenderness. There is no guarding or rebound. Musculoskeletal:         General: Normal range of motion. Cervical back: Normal range of motion and neck supple. Skin:     General: Skin is warm and dry. Coloration: Skin is not pale. Neurological:      Mental Status: She is alert and oriented to person, place, and time.    Psychiatric:         Behavior: Behavior normal.

## 2023-05-01 ENCOUNTER — TRANSCRIBE ORDERS (OUTPATIENT)
Dept: ADMINISTRATIVE | Facility: HOSPITAL | Age: 68
End: 2023-05-01
Payer: MEDICARE

## 2023-05-01 DIAGNOSIS — R06.02 SHORTNESS OF BREATH: ICD-10-CM

## 2023-05-01 DIAGNOSIS — R07.89 OTHER CHEST PAIN: Primary | ICD-10-CM

## 2023-05-09 ENCOUNTER — HOSPITAL ENCOUNTER (OUTPATIENT)
Dept: CARDIOLOGY | Facility: HOSPITAL | Age: 68
Discharge: HOME | End: 2023-05-09
Admitting: FAMILY MEDICINE
Payer: MEDICARE

## 2023-05-09 VITALS — HEIGHT: 65 IN | WEIGHT: 166 LBS | BODY MASS INDEX: 27.66 KG/M2

## 2023-05-09 DIAGNOSIS — R06.02 SHORTNESS OF BREATH: ICD-10-CM

## 2023-05-09 DIAGNOSIS — R07.89 OTHER CHEST PAIN: ICD-10-CM

## 2023-05-09 LAB
BH CV STRESS BP STAGE 1: NORMAL
BH CV STRESS BP STAGE 2: NORMAL
BH CV STRESS BP STAGE 3: NORMAL
BH CV STRESS DURATION MIN STAGE 1: 3
BH CV STRESS DURATION MIN STAGE 2: 3
BH CV STRESS DURATION MIN STAGE 3: 0
BH CV STRESS DURATION SEC STAGE 1: 0
BH CV STRESS DURATION SEC STAGE 2: 0
BH CV STRESS DURATION SEC STAGE 3: 35
BH CV STRESS GRADE STAGE 1: 10
BH CV STRESS GRADE STAGE 2: 12
BH CV STRESS GRADE STAGE 3: 14
BH CV STRESS HR STAGE 1: 108
BH CV STRESS HR STAGE 2: 127
BH CV STRESS HR STAGE 3: 134
BH CV STRESS METS STAGE 1: 5
BH CV STRESS METS STAGE 2: 7.5
BH CV STRESS METS STAGE 3: 10
BH CV STRESS PROTOCOL 1: NORMAL
BH CV STRESS RECOVERY BP: NORMAL MMHG
BH CV STRESS RECOVERY HR: 78 BPM
BH CV STRESS SPEED STAGE 1: 1.7
BH CV STRESS SPEED STAGE 2: 2.5
BH CV STRESS SPEED STAGE 3: 3.4
BH CV STRESS STAGE 1: 1
BH CV STRESS STAGE 2: 2
BH CV STRESS STAGE 3: 3
MAXIMAL PREDICTED HEART RATE: 151 BPM
PERCENT MAX PREDICTED HR: 88.74 %
STRESS BASELINE BP: NORMAL MMHG
STRESS BASELINE HR: 75 BPM
STRESS PERCENT HR: 104 %
STRESS POST ESTIMATED WORKLOAD: 10 METS
STRESS POST EXERCISE DUR MIN: 6 MIN
STRESS POST EXERCISE DUR SEC: 35 SEC
STRESS POST PEAK BP: NORMAL MMHG
STRESS POST PEAK HR: 134 BPM
STRESS TARGET HR: 128 BPM

## 2023-05-09 PROCEDURE — 25510000001 PERFLUTREN 6.52 MG/ML SUSPENSION: Performed by: INTERNAL MEDICINE

## 2023-05-09 PROCEDURE — 93018 CV STRESS TEST I&R ONLY: CPT | Performed by: INTERNAL MEDICINE

## 2023-05-09 PROCEDURE — 93350 STRESS TTE ONLY: CPT

## 2023-05-09 PROCEDURE — 93350 STRESS TTE ONLY: CPT | Performed by: INTERNAL MEDICINE

## 2023-05-09 PROCEDURE — 93017 CV STRESS TEST TRACING ONLY: CPT

## 2023-05-09 PROCEDURE — 93352 ADMIN ECG CONTRAST AGENT: CPT | Performed by: INTERNAL MEDICINE

## 2023-05-09 RX ADMIN — PERFLUTREN 8.48 MG: 6.52 INJECTION, SUSPENSION INTRAVENOUS at 10:17

## 2023-05-12 ENCOUNTER — OFFICE VISIT (OUTPATIENT)
Dept: CARDIOLOGY | Facility: CLINIC | Age: 68
End: 2023-05-12
Payer: MEDICARE

## 2023-05-12 VITALS
HEART RATE: 64 BPM | SYSTOLIC BLOOD PRESSURE: 126 MMHG | HEIGHT: 65 IN | DIASTOLIC BLOOD PRESSURE: 77 MMHG | WEIGHT: 165 LBS | BODY MASS INDEX: 27.49 KG/M2

## 2023-05-12 DIAGNOSIS — G47.33 OSA ON CPAP: Primary | ICD-10-CM

## 2023-05-12 DIAGNOSIS — Z99.89 OSA ON CPAP: Primary | ICD-10-CM

## 2023-05-12 DIAGNOSIS — Z78.9 NONSMOKER: ICD-10-CM

## 2023-05-12 DIAGNOSIS — R07.9 CHEST PAIN IN ADULT: ICD-10-CM

## 2023-05-12 DIAGNOSIS — I65.29 STENOSIS OF CAROTID ARTERY, UNSPECIFIED LATERALITY: ICD-10-CM

## 2023-05-12 DIAGNOSIS — E78.5 DYSLIPIDEMIA: ICD-10-CM

## 2023-05-12 DIAGNOSIS — R06.09 DOE (DYSPNEA ON EXERTION): ICD-10-CM

## 2023-05-12 DIAGNOSIS — R00.2 PALPITATIONS: ICD-10-CM

## 2023-05-12 DIAGNOSIS — Z99.89 CPAP (CONTINUOUS POSITIVE AIRWAY PRESSURE) DEPENDENCE: ICD-10-CM

## 2023-05-12 PROCEDURE — 1160F RVW MEDS BY RX/DR IN RCRD: CPT | Performed by: INTERNAL MEDICINE

## 2023-05-12 PROCEDURE — 3078F DIAST BP <80 MM HG: CPT | Performed by: INTERNAL MEDICINE

## 2023-05-12 PROCEDURE — 3074F SYST BP LT 130 MM HG: CPT | Performed by: INTERNAL MEDICINE

## 2023-05-12 PROCEDURE — 99204 OFFICE O/P NEW MOD 45 MIN: CPT | Performed by: INTERNAL MEDICINE

## 2023-05-12 PROCEDURE — 1159F MED LIST DOCD IN RCRD: CPT | Performed by: INTERNAL MEDICINE

## 2023-05-12 NOTE — PROGRESS NOTES
Edie Oconnor  [id number]  1955  68 y.o.  female    Referring Provider: Collette Xiong DO    Reason for  Visit:  Initial visit     Subjective     Chest pain both with exertion as well as at rest.  Feels episodes of chest pain occur no more often with exertion  Moderate substernal,   Pressure like   Chest pain non pleuritic  Chest pain non positional and non gustatory   Lasts less than 5 minutes  Sometimes wakes up with chest pain     Started more than 6 months ago  Occurs once or twice a week on the average but can be variable in frequency  Associated diaphoresis  Associated nausea  No radiation    Relieved with rest or spontaneously  Not positional    No change with intake of food or antacids  No change with breathing  Mild to moderate associated dyspnea    No similar chest pain episodes in the past    Strong family history of early coronary artery disease    Joint pain in small, medium and large joints     Intermittent palpitations, once every several days to several weeks lasting for less than 1 minute  Associated symptoms of dizziness, weakness and shortness of breath    Intermittent dizzy spells, no presyncope or syncope             History of present illness:  Edie Oconnor is a 68 y.o. yo female with obstructive sleep apnea who presents today for   Chief Complaint   Patient presents with   • Establish Care     ABN ECHO    • Shortness of Breath          .    History  Past Medical History:   Diagnosis Date   • Abnormal ECG 05/09/23    Abnormal   • Anemia not sure   • Arthritis    • Claustrophobia    • Coronary artery disease     Carotid artery   • Depression    • Diabetes mellitus Type 2- 2/2020   • Diverticulitis    • GERD (gastroesophageal reflux disease)    • H/O Bell's palsy     Right   • Hiatal hernia    • Hx of colonic polyp    • Hyperlipidemia    • Hypertension 2014    Controlled   • Neuromuscular disorder     Neuropathy   • Peptic ulceration    • Peripheral vascular disease    • PONV  (postoperative nausea and vomiting)    • Sleep apnea    • Stroke TIA   • TIA (transient ischemic attack)    ,   Past Surgical History:   Procedure Laterality Date   • APPENDECTOMY     • CARDIAC CATHETERIZATION     • CHOLECYSTECTOMY     • COLONOSCOPY  12/10/2012    diverticula of sigmoid, tubular adenoma   • COLONOSCOPY N/A 02/24/2017    Tubular adenoma ascending colon, Diverticulosis repeat exam in 5 years   • COLONOSCOPY N/A 05/07/2019    Procedure: COLONOSCOPY WITH ANESTHESIA;  Surgeon: Dom Parker MD;  Location: [facility] ENDOSCOPY;  Service: Gastroenterology   • ELBOW OPEN REDUCTION INTERNAL FIXATION Left    • ENDOSCOPY  08/08/2016    HH, [place] Grade B reflux esophagitis   • HYSTERECTOMY     • UPPER GASTROINTESTINAL ENDOSCOPY  08/08/2016    hiatla hernia, [place] Grade B reflux esophagitis   ,   Family History   Problem Relation Age of Onset   • Diabetes Mother    • Hypertension Mother    • Stroke Mother         mild stroke   • Cancer Father         lung   • Heart disease Sister    • Cancer Sister         Pancreatic cancer   • Cancer Brother         pancreatic cancer   • Colon polyps Brother    • Colon cancer Neg Hx    ,   Social History     Tobacco Use   • Smoking status: Never   • Smokeless tobacco: Never   Substance Use Topics   • Alcohol use: Yes     Alcohol/week: 1.0 standard drink     Types: 1 Glasses of wine per week     Comment: drink occasional glass of wine-maybe 1 glass every 3 months   • Drug use: No   ,     Medications  Current Outpatient Medications   Medication Sig Dispense Refill   • cholecalciferol (VITAMIN D3) 25 MCG (1000 UT) tablet Take 1 tablet by mouth Daily.     • diphenoxylate-atropine (LOMOTIL) 2.5-0.025 MG per tablet 1 tablet Daily.     • doxycycline (PERIOSTAT) 20 MG tablet 1 tablet As Needed.     • loperamide (IMODIUM) 2 MG capsule Take 1 capsule by mouth As Needed for Diarrhea.     • Multiple Vitamins-Minerals (MULTIVITAMIN WITH MINERALS) tablet tablet Take 1 tablet by mouth Daily.     •  "pantoprazole (PROTONIX) 40 MG EC tablet Take 1 tablet by mouth Daily.     • vitamin B-12 (CYANOCOBALAMIN) 500 MCG tablet Take 1 tablet by mouth Daily.     • Zinc 30 MG capsule Take 1 capsule by mouth Daily.       No current facility-administered medications for this visit.       Allergies:  Prochlorperazine    Review of Systems  Review of Systems   Constitutional: Negative.   HENT: Negative.    Eyes: Negative.    Cardiovascular: Positive for chest pain and dyspnea on exertion. Negative for claudication, cyanosis, irregular heartbeat, leg swelling, near-syncope, orthopnea, palpitations, paroxysmal nocturnal dyspnea and syncope.   Respiratory: Negative.    Endocrine: Negative.    Hematologic/Lymphatic: Negative.    Skin: Negative.    Musculoskeletal: Positive for arthritis.   Gastrointestinal: Negative for anorexia.   Genitourinary: Negative.    Neurological: Negative.    Psychiatric/Behavioral: Negative.        Objective     Physical Exam:  /77   Pulse 64   Ht 165.1 cm (65\")   Wt 74.8 kg (165 lb)   BMI 27.46 kg/m²     Physical Exam  Constitutional:       Appearance: She is well-developed.   HENT:      Head: Normocephalic.   Neck:      Vascular: Normal carotid pulses. No carotid bruit or JVD.      Trachea: No tracheal tenderness or tracheal deviation.   Cardiovascular:      Rate and Rhythm: Regular rhythm.      Pulses: Normal pulses.      Heart sounds: Normal heart sounds.   Pulmonary:      Effort: Pulmonary effort is normal.      Breath sounds: No stridor.   Abdominal:      Palpations: Abdomen is soft.   Musculoskeletal:      Cervical back: No edema.   Skin:     General: Skin is warm.   Neurological:      Mental Status: She is alert.      Cranial Nerves: No cranial nerve deficit.      Sensory: No sensory deficit.   Psychiatric:         Speech: Speech normal.         Behavior: Behavior normal.         Results Review:    Results for orders placed during the hospital encounter of 05/09/23    Adult Stress Echo " W/ Cont or Stress Agent if Necessary Per Protocol    Interpretation Summary  •  Normal baseline ECG noted at rest.  •  The patient did report nonlimiting chest discomfort with stress.  •  With stress, there was no clinically significant ST segment deviation noted.  •  With stress, following left ventricular wall segments appear to be hypokinetic: apical lateral and mid inferolateral.  •  Abnormal stress echo with echocardiographic evidence for myocardial ischemia.          ____________________________________________________________________________________________________________________________________________  Health maintenance and recommendations    Low salt/ HTN/ Heart healthy carbohydrate restricted cardiac diet   The patient is advised to reduce or avoid caffeine or other cardiac stimulants.   Minimize or avoid  NSAID-type medications      Monitor for any signs of bleeding including red or dark stools. Fall precautions.   Advised staying uptodate with immunizations per established standard guidelines.    Offered to give patient  a copy of my notes     Questions were encouraged, asked and answered to the patient's  understanding and satisfaction. Questions if any regarding current medications and side effects, need for refills and importance of compliance to medications stressed.    Reviewed available prior notes, consults, prior visits, laboratory findings, radiology and cardiology relevant reports. Updated chart as applicable. I have reviewed the patient's medical history in detail and updated the computerized patient record as relevant.      Updated patient regarding any new or relevant abnormalities on review of records or any new findings on physical exam. Mentioned to patient about purpose of visit and desirable health short and long term goals and objectives.    Primary to monitor CBC CMP Lipid panel and TSH as  applicable    ___________________________________________________________________________________________________________________________________________   Procedures    Assessment & Plan   Diagnoses and all orders for this visit:    1. LAN on CPAP (Primary)    2. Dyslipidemia    3. Stenosis of carotid artery, unspecified laterality    4. CPAP (continuous positive airway pressure) dependence    5. Nonsmoker    6. Palpitations  -     Holter Monitor - 72 Hour Up To 15 Days; Future    7. Chest pain in adult  -     CT Angiogram Coronary; Future    8. SILVER (dyspnea on exertion)  -     Adult Transthoracic Echo Complete w/ Color, Spectral and Contrast if necessary per protocol; Future          Plan    Patient expressed understanding  Encouraged and answered all questions   Discussed with the patient and all questioned fully answered. She will call me if any problems arise.   Discussed results of prior testing with patient : stress echo   as well electrocardiogram from today       Orders Placed This Encounter   Procedures   • CT Angiogram Coronary     Standing Status:   Future     Standing Expiration Date:   5/12/2024     Order Specific Question:   Release to patient     Answer:   Routine Release   • Holter Monitor - 72 Hour Up To 15 Days     Standing Status:   Future     Standing Expiration Date:   5/12/2024     Order Specific Question:   Reason for exam?     Answer:   Palpitations     Order Specific Question:   Release to patient     Answer:   Routine Release     Order Specific Question:   How many days is the patient to wear the monitor?     Answer:   14   • Adult Transthoracic Echo Complete w/ Color, Spectral and Contrast if necessary per protocol     Standing Status:   Future     Standing Expiration Date:   5/12/2024     Scheduling Instructions:      Myocardial strain to be performed during echocardiogram as long as technically feasible     Order Specific Question:   Reason for exam?     Answer:   Dyspnea     Order  Specific Question:   Release to patient     Answer:   Routine Release        Further workup and treatment pending results of above testing   Patient will call for results      Can use [employer] and bring me the tracing next visit             Return in about 6 weeks (around 6/23/2023).

## 2023-05-26 ENCOUNTER — HOSPITAL ENCOUNTER (OUTPATIENT)
Dept: CT IMAGING | Facility: HOSPITAL | Age: 68
Discharge: HOME | End: 2023-05-26
Payer: MEDICARE

## 2023-05-26 ENCOUNTER — APPOINTMENT (OUTPATIENT)
Dept: CT IMAGING | Facility: HOSPITAL | Age: 68
End: 2023-05-26
Payer: MEDICARE

## 2023-05-26 VITALS
SYSTOLIC BLOOD PRESSURE: 110 MMHG | HEART RATE: 61 BPM | DIASTOLIC BLOOD PRESSURE: 67 MMHG | OXYGEN SATURATION: 96 % | RESPIRATION RATE: 15 BRPM

## 2023-05-26 DIAGNOSIS — R93.89 ABNORMAL FINDINGS ON DIAGNOSTIC IMAGING OF CARDIOVASCULAR SYSTEM: ICD-10-CM

## 2023-05-26 DIAGNOSIS — R93.1 ABNORMAL FINDINGS ON DIAGNOSTIC IMAGING OF HEART AND CORONARY CIRCULATION: ICD-10-CM

## 2023-05-26 DIAGNOSIS — R93.89 ABNORMAL FINDINGS ON DIAGNOSTIC IMAGING OF CARDIOVASCULAR SYSTEM: Primary | ICD-10-CM

## 2023-05-26 DIAGNOSIS — R07.9 CHEST PAIN IN ADULT: ICD-10-CM

## 2023-05-26 LAB
CREAT SERPL-MCNC: 0.45 MG/DL (ref 0.57–1)
EGFRCR SERPLBLD CKD-EPI 2021: 104.3 ML/MIN/1.73

## 2023-05-26 PROCEDURE — 82565 ASSAY OF CREATININE: CPT | Performed by: INTERNAL MEDICINE

## 2023-05-26 PROCEDURE — 0503T HC CORONARY FFR CTA DATA ALYS & GNRJ ESTIMATED FFR MODEL: CPT

## 2023-05-26 PROCEDURE — 25510000001 IOPAMIDOL PER 1 ML: Performed by: INTERNAL MEDICINE

## 2023-05-26 PROCEDURE — 0502T HC CORONARY FFR DERIVED CTA DATA PREP & TRANSMIS: CPT

## 2023-05-26 PROCEDURE — 75574 CT ANGIO HRT W/3D IMAGE: CPT | Performed by: INTERNAL MEDICINE

## 2023-05-26 PROCEDURE — 75574 CT ANGIO HRT W/3D IMAGE: CPT

## 2023-05-26 RX ORDER — METOPROLOL TARTRATE 5 MG/5ML
5 INJECTION INTRAVENOUS
Status: DISCONTINUED | OUTPATIENT
Start: 2023-05-26 | End: 2023-05-27 | Stop reason: HOSPADM

## 2023-05-26 RX ORDER — METOPROLOL TARTRATE 100 MG/1
100 TABLET ORAL ONCE AS NEEDED
Status: COMPLETED | OUTPATIENT
Start: 2023-05-26 | End: 2023-05-26

## 2023-05-26 RX ORDER — METOPROLOL TARTRATE 50 MG/1
TABLET, FILM COATED ORAL
Status: COMPLETED
Start: 2023-05-26 | End: 2023-05-26

## 2023-05-26 RX ORDER — SODIUM CHLORIDE 0.9 % (FLUSH) 0.9 %
10 SYRINGE (ML) INJECTION AS NEEDED
Status: DISCONTINUED | OUTPATIENT
Start: 2023-05-26 | End: 2023-05-27 | Stop reason: HOSPADM

## 2023-05-26 RX ORDER — METOPROLOL TARTRATE 50 MG/1
50 TABLET, FILM COATED ORAL ONCE AS NEEDED
Status: COMPLETED | OUTPATIENT
Start: 2023-05-26 | End: 2023-05-26

## 2023-05-26 RX ORDER — SODIUM CHLORIDE 0.9 % (FLUSH) 0.9 %
3 SYRINGE (ML) INJECTION EVERY 12 HOURS SCHEDULED
Status: DISCONTINUED | OUTPATIENT
Start: 2023-05-26 | End: 2023-05-27 | Stop reason: HOSPADM

## 2023-05-26 RX ORDER — LIDOCAINE HYDROCHLORIDE 10 MG/ML
5 INJECTION, SOLUTION EPIDURAL; INFILTRATION; INTRACAUDAL; PERINEURAL AS NEEDED
Status: DISCONTINUED | OUTPATIENT
Start: 2023-05-26 | End: 2023-05-27 | Stop reason: HOSPADM

## 2023-05-26 RX ORDER — MONTELUKAST SODIUM 4 MG/1
1 TABLET, CHEWABLE ORAL 2 TIMES DAILY
COMMUNITY

## 2023-05-26 RX ORDER — NITROGLYCERIN 0.4 MG/1
TABLET SUBLINGUAL
Status: COMPLETED
Start: 2023-05-26 | End: 2023-05-26

## 2023-05-26 RX ADMIN — NITROGLYCERIN: 0.4 TABLET SUBLINGUAL at 12:32

## 2023-05-26 RX ADMIN — IOPAMIDOL 100 ML: 755 INJECTION, SOLUTION INTRAVENOUS at 12:42

## 2023-05-26 RX ADMIN — METOPROLOL TARTRATE 50 MG: 50 TABLET, FILM COATED ORAL at 11:08

## 2023-06-13 ENCOUNTER — HOSPITAL ENCOUNTER (OUTPATIENT)
Dept: CARDIOLOGY | Facility: HOSPITAL | Age: 68
Discharge: HOME | End: 2023-06-13
Admitting: INTERNAL MEDICINE
Payer: MEDICARE

## 2023-06-13 VITALS
HEIGHT: 65 IN | BODY MASS INDEX: 27.49 KG/M2 | SYSTOLIC BLOOD PRESSURE: 110 MMHG | WEIGHT: 165 LBS | DIASTOLIC BLOOD PRESSURE: 67 MMHG

## 2023-06-13 DIAGNOSIS — R06.09 DOE (DYSPNEA ON EXERTION): ICD-10-CM

## 2023-06-13 PROCEDURE — 93306 TTE W/DOPPLER COMPLETE: CPT

## 2023-06-13 PROCEDURE — 93306 TTE W/DOPPLER COMPLETE: CPT | Performed by: INTERNAL MEDICINE

## 2023-06-13 PROCEDURE — 25510000001 PERFLUTREN 6.52 MG/ML SUSPENSION: Performed by: INTERNAL MEDICINE

## 2023-06-13 PROCEDURE — 93356 MYOCRD STRAIN IMG SPCKL TRCK: CPT

## 2023-06-13 PROCEDURE — 93356 MYOCRD STRAIN IMG SPCKL TRCK: CPT | Performed by: INTERNAL MEDICINE

## 2023-06-13 RX ADMIN — PERFLUTREN 13.04 MG: 6.52 INJECTION, SUSPENSION INTRAVENOUS at 08:40

## 2023-06-16 LAB
BH CV ECHO LEFT VENTRICLE GLOBAL LONGITUDINAL STRAIN: -15.3 %
BH CV ECHO MEAS - AO MAX PG: 5.4 MMHG
BH CV ECHO MEAS - AO MEAN PG: 2 MMHG
BH CV ECHO MEAS - AO ROOT DIAM: 3.4 CM
BH CV ECHO MEAS - AO V2 MAX: 116 CM/SEC
BH CV ECHO MEAS - AO V2 VTI: 24.8 CM
BH CV ECHO MEAS - AVA(I,D): 2.33 CM2
BH CV ECHO MEAS - EDV(CUBED): 80.6 ML
BH CV ECHO MEAS - EDV(MOD-SP2): 84.2 ML
BH CV ECHO MEAS - EDV(MOD-SP4): 97.1 ML
BH CV ECHO MEAS - EF(MOD-BP): 61.7 %
BH CV ECHO MEAS - EF(MOD-SP2): 58.3 %
BH CV ECHO MEAS - EF(MOD-SP4): 63.5 %
BH CV ECHO MEAS - ESV(CUBED): 25.2 ML
BH CV ECHO MEAS - ESV(MOD-SP2): 35.1 ML
BH CV ECHO MEAS - ESV(MOD-SP4): 35.4 ML
BH CV ECHO MEAS - FS: 32.2 %
BH CV ECHO MEAS - IVS/LVPW: 0.91 CM
BH CV ECHO MEAS - IVSD: 1.01 CM
BH CV ECHO MEAS - LA DIMENSION: 3.9 CM
BH CV ECHO MEAS - LAT PEAK E' VEL: 7.9 CM/SEC
BH CV ECHO MEAS - LV DIASTOLIC VOL/BSA (35-75): 53.3 CM2
BH CV ECHO MEAS - LV MASS(C)D: 155.7 GRAMS
BH CV ECHO MEAS - LV MAX PG: 3.8 MMHG
BH CV ECHO MEAS - LV MEAN PG: 2 MMHG
BH CV ECHO MEAS - LV SYSTOLIC VOL/BSA (12-30): 19.4 CM2
BH CV ECHO MEAS - LV V1 MAX: 97.5 CM/SEC
BH CV ECHO MEAS - LV V1 VTI: 20.4 CM
BH CV ECHO MEAS - LVIDD: 4.3 CM
BH CV ECHO MEAS - LVIDS: 2.9 CM
BH CV ECHO MEAS - LVOT AREA: 2.8 CM2
BH CV ECHO MEAS - LVOT DIAM: 1.9 CM
BH CV ECHO MEAS - LVPWD: 1.11 CM
BH CV ECHO MEAS - MED PEAK E' VEL: 5 CM/SEC
BH CV ECHO MEAS - MV A MAX VEL: 77.1 CM/SEC
BH CV ECHO MEAS - MV DEC SLOPE: 356 CM/SEC2
BH CV ECHO MEAS - MV DEC TIME: 0.19 MSEC
BH CV ECHO MEAS - MV E MAX VEL: 66.5 CM/SEC
BH CV ECHO MEAS - MV E/A: 0.86
BH CV ECHO MEAS - MV MAX PG: 2.9 MMHG
BH CV ECHO MEAS - MV MEAN PG: 1 MMHG
BH CV ECHO MEAS - MV V2 VTI: 24.1 CM
BH CV ECHO MEAS - MVA(VTI): 2.4 CM2
BH CV ECHO MEAS - PA V2 MAX: 74.1 CM/SEC
BH CV ECHO MEAS - PI END-D VEL: 70.6 CM/SEC
BH CV ECHO MEAS - RAP SYSTOLE: 5 MMHG
BH CV ECHO MEAS - RV MAX PG: 1.4 MMHG
BH CV ECHO MEAS - RV V1 MAX: 59.1 CM/SEC
BH CV ECHO MEAS - RV V1 VTI: 14 CM
BH CV ECHO MEAS - RVDD: 3.4 CM
BH CV ECHO MEAS - RVSP: 30 MMHG
BH CV ECHO MEAS - SI(MOD-SP2): 26.9 ML/M2
BH CV ECHO MEAS - SI(MOD-SP4): 33.8 ML/M2
BH CV ECHO MEAS - SV(LVOT): 57.8 ML
BH CV ECHO MEAS - SV(MOD-SP2): 49.1 ML
BH CV ECHO MEAS - SV(MOD-SP4): 61.7 ML
BH CV ECHO MEAS - TR MAX PG: 25 MMHG
BH CV ECHO MEAS - TR MAX VEL: 250 CM/SEC
BH CV ECHO MEASUREMENTS AVERAGE E/E' RATIO: 10.31
BH CV XLRA - TDI S': 11.1 CM/SEC
LEFT ATRIUM VOLUME INDEX: 27.7 ML/M2
LEFT ATRIUM VOLUME: 50.8 ML

## 2023-07-18 PROBLEM — E66.9 OBESITY, UNSPECIFIED OBESITY SEVERITY, UNSPECIFIED OBESITY TYPE: Status: RESOLVED | Noted: 2019-04-11 | Resolved: 2023-07-18

## 2023-08-10 ENCOUNTER — HOSPITAL ENCOUNTER (OUTPATIENT)
Dept: CARDIOLOGY | Facility: HOSPITAL | Age: 68
Discharge: HOME | End: 2023-08-10
Payer: MEDICARE

## 2023-08-10 VITALS
TEMPERATURE: 97.2 F | RESPIRATION RATE: 17 BRPM | SYSTOLIC BLOOD PRESSURE: 120 MMHG | DIASTOLIC BLOOD PRESSURE: 79 MMHG | HEART RATE: 62 BPM | HEIGHT: 65 IN | OXYGEN SATURATION: 96 % | BODY MASS INDEX: 27.36 KG/M2 | WEIGHT: 164.2 LBS

## 2023-08-10 DIAGNOSIS — G45.9 TRANSIENT CEREBRAL ISCHEMIA, UNSPECIFIED TYPE: ICD-10-CM

## 2023-08-10 PROCEDURE — C1764 EVENT RECORDER, CARDIAC: HCPCS

## 2023-08-10 PROCEDURE — 33285 INSJ SUBQ CAR RHYTHM MNTR: CPT

## 2023-08-10 RX ORDER — LIDOCAINE HYDROCHLORIDE 10 MG/ML
INJECTION, SOLUTION EPIDURAL; INFILTRATION; INTRACAUDAL; PERINEURAL
Status: COMPLETED | OUTPATIENT
Start: 2023-08-10 | End: 2023-08-10

## 2023-08-10 RX ORDER — LIDOCAINE HYDROCHLORIDE 10 MG/ML
INJECTION, SOLUTION INFILTRATION; PERINEURAL
Status: DISPENSED
Start: 2023-08-10 | End: 2023-08-10

## 2023-08-10 RX ADMIN — LIDOCAINE HYDROCHLORIDE 10 ML: 10 INJECTION, SOLUTION EPIDURAL; INFILTRATION; INTRACAUDAL; PERINEURAL at 12:56

## 2023-09-20 ENCOUNTER — OFFICE VISIT (OUTPATIENT)
Dept: NEUROLOGY | Age: 68
End: 2023-09-20
Payer: MEDICARE

## 2023-09-20 VITALS
OXYGEN SATURATION: 99 % | BODY MASS INDEX: 27.82 KG/M2 | HEIGHT: 65 IN | HEART RATE: 67 BPM | DIASTOLIC BLOOD PRESSURE: 72 MMHG | SYSTOLIC BLOOD PRESSURE: 116 MMHG | WEIGHT: 167 LBS

## 2023-09-20 DIAGNOSIS — G47.33 OBSTRUCTIVE SLEEP APNEA: Primary | ICD-10-CM

## 2023-09-20 DIAGNOSIS — G25.81 RESTLESS LEG SYNDROME: ICD-10-CM

## 2023-09-20 DIAGNOSIS — Z99.89 CPAP (CONTINUOUS POSITIVE AIRWAY PRESSURE) DEPENDENCE: ICD-10-CM

## 2023-09-20 DIAGNOSIS — G47.00 INSOMNIA, UNSPECIFIED TYPE: ICD-10-CM

## 2023-09-20 PROCEDURE — 1090F PRES/ABSN URINE INCON ASSESS: CPT | Performed by: PHYSICIAN ASSISTANT

## 2023-09-20 PROCEDURE — 99214 OFFICE O/P EST MOD 30 MIN: CPT | Performed by: PHYSICIAN ASSISTANT

## 2023-09-20 PROCEDURE — G8427 DOCREV CUR MEDS BY ELIG CLIN: HCPCS | Performed by: PHYSICIAN ASSISTANT

## 2023-09-20 PROCEDURE — G8400 PT W/DXA NO RESULTS DOC: HCPCS | Performed by: PHYSICIAN ASSISTANT

## 2023-09-20 PROCEDURE — G8417 CALC BMI ABV UP PARAM F/U: HCPCS | Performed by: PHYSICIAN ASSISTANT

## 2023-09-20 PROCEDURE — 1036F TOBACCO NON-USER: CPT | Performed by: PHYSICIAN ASSISTANT

## 2023-09-20 PROCEDURE — 1123F ACP DISCUSS/DSCN MKR DOCD: CPT | Performed by: PHYSICIAN ASSISTANT

## 2023-09-20 PROCEDURE — 3017F COLORECTAL CA SCREEN DOC REV: CPT | Performed by: PHYSICIAN ASSISTANT

## 2023-09-20 NOTE — PATIENT INSTRUCTIONS
especially bad when trying to stay still to read a book, watch television, or fall asleep. But people can make the feeling go away temporarily if they walk around or move their legs. Some people with RLS find that their legs move on their own while they are asleep. In short, the symptoms:  ? Happen when you are at rest  ?Go away if you move your legs on purpose  ? Are worst at night  ? Sometimes include the legs moving on their own during sleep  Together, the symptoms of RLS can make it hard to get a good night's sleep. People with the condition often feel tired during the day. Is there a test for RLS? -- There is a test, but it is not usually necessary. Your doctor or nurse should be able to tell if you have it by asking about your symptoms and doing an exam. Still, it is possible that your doctor or nurse will decide to send you for a \"sleep study,\" to be sure of what is happening. For a sleep study, you spend the night in a lab, and you are hooked up to different machines that monitor your movements, heart rate, breathing, and other body functions. Is there anything I can do on my own to feel better? -- Yes. You might feel better if you:  ? Do activities that keep your mind alert during the day, such as crossword puzzles  ? Get moderate regular exercise  ? Massage your legs (or have someone massage them)  ? Apply heat to your legs with heating pads or by taking a warm bath  ? Avoid taking medicines that can make RLS worse - These include antihistamines like diphenhydramine (sample brand name: Benadryl). Should I see a doctor or nurse? -- See your doctor or nurse if your condition bothers you, or if it keeps you from getting a good night's sleep. How is RLS treated? -- Some people with RLS do not need medicine for it because they have mild symptoms that don't bother them very often. If treatment is needed, there are a number of medicines doctors can suggest. Examples include:  ?Iron supplements  ? Pramipexole (brand

## 2023-11-28 ENCOUNTER — TELEPHONE (OUTPATIENT)
Dept: GASTROENTEROLOGY | Age: 68
End: 2023-11-28

## 2023-11-28 NOTE — TELEPHONE ENCOUNTER
I guess what is throwing me off is there is also a 1 yr fu recall per Dr Marleny Marina due in Dex as well, but I can make it with Nadine Skip if that works?

## 2023-11-28 NOTE — TELEPHONE ENCOUNTER
Patient called to schedule her 1 yr followup with dr Anat Cook per recall letter she received. Please return patient's call.

## 2023-12-14 ENCOUNTER — OFFICE VISIT (OUTPATIENT)
Dept: GASTROENTEROLOGY | Age: 68
End: 2023-12-14
Payer: MEDICARE

## 2023-12-14 VITALS
SYSTOLIC BLOOD PRESSURE: 120 MMHG | BODY MASS INDEX: 28.32 KG/M2 | WEIGHT: 170 LBS | DIASTOLIC BLOOD PRESSURE: 80 MMHG | OXYGEN SATURATION: 97 % | HEIGHT: 65 IN | HEART RATE: 79 BPM

## 2023-12-14 DIAGNOSIS — Z80.0 FAMILY HISTORY OF PANCREATIC CANCER: ICD-10-CM

## 2023-12-14 DIAGNOSIS — R19.7 INTERMITTENT DIARRHEA: ICD-10-CM

## 2023-12-14 DIAGNOSIS — K21.9 CHRONIC GERD: Primary | ICD-10-CM

## 2023-12-14 DIAGNOSIS — Z91.89 AT HIGH RISK FOR PANCREATIC CANCER: ICD-10-CM

## 2023-12-14 PROCEDURE — 3017F COLORECTAL CA SCREEN DOC REV: CPT | Performed by: NURSE PRACTITIONER

## 2023-12-14 PROCEDURE — G8484 FLU IMMUNIZE NO ADMIN: HCPCS | Performed by: NURSE PRACTITIONER

## 2023-12-14 PROCEDURE — G8417 CALC BMI ABV UP PARAM F/U: HCPCS | Performed by: NURSE PRACTITIONER

## 2023-12-14 PROCEDURE — 99214 OFFICE O/P EST MOD 30 MIN: CPT | Performed by: NURSE PRACTITIONER

## 2023-12-14 PROCEDURE — 1090F PRES/ABSN URINE INCON ASSESS: CPT | Performed by: NURSE PRACTITIONER

## 2023-12-14 PROCEDURE — 1036F TOBACCO NON-USER: CPT | Performed by: NURSE PRACTITIONER

## 2023-12-14 PROCEDURE — G8427 DOCREV CUR MEDS BY ELIG CLIN: HCPCS | Performed by: NURSE PRACTITIONER

## 2023-12-14 PROCEDURE — G8400 PT W/DXA NO RESULTS DOC: HCPCS | Performed by: NURSE PRACTITIONER

## 2023-12-14 PROCEDURE — 1123F ACP DISCUSS/DSCN MKR DOCD: CPT | Performed by: NURSE PRACTITIONER

## 2023-12-14 RX ORDER — DICYCLOMINE HCL 20 MG
20 TABLET ORAL EVERY 6 HOURS PRN
Qty: 120 TABLET | Refills: 3 | Status: SHIPPED | OUTPATIENT
Start: 2023-12-14

## 2023-12-14 RX ORDER — MONTELUKAST SODIUM 4 MG/1
1 TABLET, CHEWABLE ORAL 2 TIMES DAILY
Qty: 180 TABLET | Refills: 3 | Status: SHIPPED | OUTPATIENT
Start: 2023-12-14

## 2023-12-14 RX ORDER — PANTOPRAZOLE SODIUM 40 MG/1
40 TABLET, DELAYED RELEASE ORAL DAILY
Qty: 90 TABLET | Refills: 3 | Status: SHIPPED | OUTPATIENT
Start: 2023-12-14

## 2023-12-14 RX ORDER — DIPHENOXYLATE HYDROCHLORIDE AND ATROPINE SULFATE 2.5; .025 MG/1; MG/1
1 TABLET ORAL 4 TIMES DAILY PRN
Status: CANCELLED | OUTPATIENT
Start: 2023-12-14

## 2024-01-15 ENCOUNTER — TELEPHONE (OUTPATIENT)
Dept: GASTROENTEROLOGY | Age: 69
End: 2024-01-15

## 2024-01-15 NOTE — TELEPHONE ENCOUNTER
Called patient to remind them of their procedure with Dr. Gabriel Claros  at [facility]  on 1/17/24 to arrive at 800AM     CONFIRMED

## 2024-01-17 ENCOUNTER — HOSPITAL ENCOUNTER (OUTPATIENT)
Age: 69
Setting detail: OUTPATIENT SURGERY
Discharge: HOME | End: 2024-01-17
Attending: INTERNAL MEDICINE | Admitting: INTERNAL MEDICINE
Payer: MEDICARE

## 2024-01-17 ENCOUNTER — ANESTHESIA (OUTPATIENT)
Dept: ENDOSCOPY | Age: 69
End: 2024-01-17
Payer: MEDICARE

## 2024-01-17 ENCOUNTER — ANESTHESIA EVENT (OUTPATIENT)
Dept: ENDOSCOPY | Age: 69
End: 2024-01-17
Payer: MEDICARE

## 2024-01-17 VITALS
DIASTOLIC BLOOD PRESSURE: 67 MMHG | SYSTOLIC BLOOD PRESSURE: 109 MMHG | WEIGHT: 171 LBS | RESPIRATION RATE: 16 BRPM | HEART RATE: 71 BPM | OXYGEN SATURATION: 94 % | TEMPERATURE: 97.7 F | HEIGHT: 65 IN | BODY MASS INDEX: 28.49 KG/M2

## 2024-01-17 LAB
GLUCOSE BLD-MCNC: 132 MG/DL (ref 70–99)
PERFORMED ON: ABNORMAL

## 2024-01-17 PROCEDURE — 2500000003 HC RX 250 WO HCPCS: Performed by: NURSE ANESTHETIST, CERTIFIED REGISTERED

## 2024-01-17 PROCEDURE — 2709999900 HC NON-CHARGEABLE SUPPLY: Performed by: INTERNAL MEDICINE

## 2024-01-17 PROCEDURE — 7100000010 HC PHASE II RECOVERY - FIRST 15 MIN: Performed by: INTERNAL MEDICINE

## 2024-01-17 PROCEDURE — 2580000003 HC RX 258: Performed by: INTERNAL MEDICINE

## 2024-01-17 PROCEDURE — 3700000001 HC ADD 15 MINUTES (ANESTHESIA): Performed by: INTERNAL MEDICINE

## 2024-01-17 PROCEDURE — 7100000011 HC PHASE II RECOVERY - ADDTL 15 MIN: Performed by: INTERNAL MEDICINE

## 2024-01-17 PROCEDURE — 6360000002 HC RX W HCPCS: Performed by: NURSE ANESTHETIST, CERTIFIED REGISTERED

## 2024-01-17 PROCEDURE — 3700000000 HC ANESTHESIA ATTENDED CARE: Performed by: INTERNAL MEDICINE

## 2024-01-17 PROCEDURE — 82962 GLUCOSE BLOOD TEST: CPT

## 2024-01-17 PROCEDURE — 3609018500 HC EGD US SCOPE W/ADJACENT STRUCTURES: Performed by: INTERNAL MEDICINE

## 2024-01-17 RX ORDER — SODIUM CHLORIDE, SODIUM LACTATE, POTASSIUM CHLORIDE, CALCIUM CHLORIDE 600; 310; 30; 20 MG/100ML; MG/100ML; MG/100ML; MG/100ML
INJECTION, SOLUTION INTRAVENOUS CONTINUOUS
Status: DISCONTINUED | OUTPATIENT
Start: 2024-01-17 | End: 2024-01-17 | Stop reason: HOSPADM

## 2024-01-17 RX ORDER — PROPOFOL 10 MG/ML
INJECTION, EMULSION INTRAVENOUS PRN
Status: DISCONTINUED | OUTPATIENT
Start: 2024-01-17 | End: 2024-01-17 | Stop reason: SDUPTHER

## 2024-01-17 RX ORDER — LIDOCAINE HYDROCHLORIDE 20 MG/ML
INJECTION, SOLUTION EPIDURAL; INFILTRATION; INTRACAUDAL; PERINEURAL PRN
Status: DISCONTINUED | OUTPATIENT
Start: 2024-01-17 | End: 2024-01-17 | Stop reason: SDUPTHER

## 2024-01-17 RX ADMIN — SODIUM CHLORIDE, POTASSIUM CHLORIDE, SODIUM LACTATE AND CALCIUM CHLORIDE: 600; 310; 30; 20 INJECTION, SOLUTION INTRAVENOUS at 08:18

## 2024-01-17 RX ADMIN — PROPOFOL 50 MG: 10 INJECTION, EMULSION INTRAVENOUS at 08:41

## 2024-01-17 RX ADMIN — PROPOFOL 70 MG: 10 INJECTION, EMULSION INTRAVENOUS at 08:34

## 2024-01-17 RX ADMIN — PROPOFOL 50 MG: 10 INJECTION, EMULSION INTRAVENOUS at 08:36

## 2024-01-17 RX ADMIN — LIDOCAINE HYDROCHLORIDE 100 MG: 20 INJECTION, SOLUTION EPIDURAL; INFILTRATION; INTRACAUDAL; PERINEURAL at 08:33

## 2024-01-17 ASSESSMENT — PAIN - FUNCTIONAL ASSESSMENT
PAIN_FUNCTIONAL_ASSESSMENT: 0-10
PAIN_FUNCTIONAL_ASSESSMENT: NONE - DENIES PAIN

## 2024-01-17 NOTE — ANESTHESIA PRE PROCEDURE
Department of Anesthesiology  Preprocedure Note       Name:  Tanisha Bone   Age:  68 y.o.  :  1955                                          MRN:  [id number]         Date:  2024      Surgeon: Surgeon(s):  Gabriel Claros MD    Procedure: Procedure(s):  EGD ESOPHAGOGASTRODUODENOSCOPY ULTRASOUND    Medications prior to admission:   Prior to Admission medications    Medication Sig Start Date End Date Taking? Authorizing Provider   dicyclomine (BENTYL) 20 MG tablet Take 1 tablet by mouth every 6 hours as needed (abdominal cramping, diarrhea) 23   Anjelica Grey APRN - NP   pantoprazole (PROTONIX) 40 MG tablet Take 1 tablet by mouth daily 23   Anjelica Grey APRN - NP   colestipol (COLESTID) 1 g tablet Take 1 tablet by mouth 2 times daily 23   Anjelica Grey APRN - NP   diphenoxylate-atropine (LOMOTIL) 2.5-0.025 MG per tablet Take 1 tablet by mouth 4 times daily as needed for Diarrhea.    Ariella Miller MD   vitamin D (CHOLECALCIFEROL) 25 MCG (1000 UT) TABS tablet Take 1 tablet by mouth daily    Ariella Miller MD   Multiple Vitamins-Minerals (THERA M PLUS) TABS Take 1 tablet by mouth daily    Ariella Miller MD   vitamin B-12 (CYANOCOBALAMIN) 100 MCG tablet Take 1 tablet by mouth daily    Ariella Miller MD   Zinc 30 MG CAPS Take 1 capsule by mouth daily    Ariella Miller MD   loperamide (IMODIUM) 2 MG capsule Take 1 capsule by mouth as needed    Ariella Miller MD       Current medications:    Current Facility-Administered Medications   Medication Dose Route Frequency Provider Last Rate Last Admin   • lactated ringers IV soln infusion   IntraVENous Continuous Gabriel Claros  mL/hr at 24 New Bag at 24       Allergies:    Allergies   Allergen Reactions   • Other Swelling and Other (See Comments)     Combid stomach medicine   • Prochlorperazine Nausea And Vomiting       Problem List:    Patient Active Problem List

## 2024-01-17 NOTE — OP NOTE
Referring/Primary Care Provider: Ludmila Agrawal DO, Jones, Ross E, MD    Date of Procedure: 01/17/24    Procedure:   1. EGD with Endoscopic Ultrasound     Indications:   1. Familial pancreatitis - hereditary pancreatitis     Anesthesia:  Sedation was administered by anesthesia who monitored the patient during the procedure.    Procedure:   After reviewing the patient's chart, H&P, medications, obtaining informed consent, and discussing risks benefits and alternatives to the procedure the patient was placed in the left lateral decubitus position. A oblique viewing Olympus 160 Radial EUS scope was lubricated and inserted through the mouth into the oropharynx. Under indirect visualization, the upper esophagus was intubated. The scope was advanced to the level of the third portion of duodenum with limited views of the esophageal mucosa. Findings and maneuvers are listed in impression below.     The patient tolerated the procedure well. There were no immediate complications.    Findings:   Endoscopic Finding:   - Endoscopic views of the upper GI tract including the esophagus, stomach, and duodenum appeared normal.     Endosonographic Findings:  - The celiac axis and associated vascular structures was identified and examined.  No concerning or malignant lymphadenopathy was identified.     - Limited views of the left lobe of the liver revealed no biliary dilation or focal hepatic mass.     - The EUS scope was advanced to the duodenal bulb. The CBD was non-dilated.     - Pancreas: normal in appearance .    Estimated Blood Loss: minimal    IMPRESSION:  Normal upper EUS    RECOMMENDATIONS:   - MRI in 1 yr for pancreatic screening due to strong family history c/w hereditary pancreatitis.     The results were discussed with the patient and family. A copy of the images obtained were given to the patient.     Gabriel Claros MD  01/17/24  9:11 AM

## 2024-01-17 NOTE — DISCHARGE INSTRUCTIONS
RECOMMENDATIONS:   - MRI in 1 yr for pancreatic screening due to strong family history c/w hereditary pancreatitis.     Upper GI Endoscopy: What to Expect at Home  Your Recovery  You had an upper GI endoscopy. Your doctor used a thin, lighted tube that bends to look at the inside of your esophagus, your stomach, and the first part of the small intestine, called the duodenum.    How can you care for yourself at home?  Activity   Rest as much as you need to after you go home.  You should be able to go back to your usual activities the day after the test.  Due to anesthesia, no driving or operating equipment for 24 hours.  Diet   Follow your doctor's directions for eating after the test.  Drink plenty of fluids (unless your doctor has told you not to).  Medications   If you have a sore throat the day after the test, use an over-the-counter spray to numb your throat.  When should you call for help?   Call 911 anytime you think you may need emergency care. For example, call if:    You passed out (lost consciousness).     You have trouble breathing.     You pass maroon or bloody stools.   Call your doctor now or seek immediate medical care if:    You have pain that does not get better after your take pain medicine.     You have new or worse belly pain.     You have blood in your stools.     You are sick to your stomach and cannot keep fluids down.     You have a fever.     You cannot pass stools or gas.   Watch closely for changes in your health, and be sure to contact your doctor if:    Your throat still hurts after a day or two.     You do not get better as expected.   Endoscopic Ultrasound (Oral): What to Expect At Home  Your Recovery  After you have an endoscopic ultrasound--a test to look for problems in the stomach, liver, gallbladder, and other organs--you will stay until the sedation begins to wear off. You will be able to go home after your doctor or nurse checks to make sure you are not having any problems.  You

## 2024-01-17 NOTE — H&P
Past Medical History:  Past Medical History:   Diagnosis Date    Bell's palsy     Carotid occlusion, left     Chronic back pain     Colon polyp     CPAP (continuous positive airway pressure) dependence     7cm to 15.8cm    Depression     Diverticulitis 2006    DM (diabetes mellitus) (HCC)     GERD (gastroesophageal reflux disease)     H/O diverticulitis of colon     Hiatal hernia     Hyperlipidemia     Hypertension     Hypothyroid     Irritable bowel syndrome     Neuropathy     Obstructive sleep apnea     AHI:  35.3 per HST, 12/2016    Restless legs syndrome     Rheumatoid aortitis     Sleep apnea     c-pap    TIA (transient ischemic attack)     Dr Brown       Past Surgical History:  Past Surgical History:   Procedure Laterality Date    APPENDECTOMY      CARDIAC CATHETERIZATION      CHOLECYSTECTOMY      COLONOSCOPY  12/10/2012    Dr Moody-Diverticular of sigmoid, AP    COLONOSCOPY  02/24/2017    Dr Moody-AP x 1, BCM x 2, 5 yr recall    COLONOSCOPY N/A 09/02/2021    Dr TERESA ClarosScrlw-Lwhagpxxhmjg-TLK, 5 yr recall    COLONOSCOPY  05/07/2019    Dr Moody-BCM, 5 yr recall    ELBOW SURGERY Left 03/26/2015    HYSTERECTOMY (CERVIX STATUS UNKNOWN)      total    INSERTABLE CARDIAC MONITOR  06/2023    stays in up to 5yrs    UPPER GASTROINTESTINAL ENDOSCOPY  08/08/2016    Dr Moody-Hiatal hernia, [place] Grade B reflux esophagitis    UPPER GASTROINTESTINAL ENDOSCOPY N/A 09/02/2021    Dr TERESA Claros-Severe ulcerative gastritis, esophagitis, EOE?, no h pylori    UPPER GASTROINTESTINAL ENDOSCOPY  01/17/2024    Dr TERESA Claros-w/EUS-Normal, MRI recall in 1 yr    UPPER GASTROINTESTINAL ENDOSCOPY N/A 1/17/2024    EGD ESOPHAGOGASTRODUODENOSCOPY ULTRASOUND performed by Gabriel Claros MD at [facility]       Social History:  Social History     Tobacco Use    Smoking status: Never    Smokeless tobacco: Never   Vaping Use    Vaping Use: Never used   Substance Use Topics    Alcohol use: Yes     Alcohol/week: 1.0 standard drink of alcohol

## 2024-01-17 NOTE — ANESTHESIA POSTPROCEDURE EVALUATION
Department of Anesthesiology  Postprocedure Note    Patient: Tanisha Bone  MRN: [id number]  YOB: 1955  Date of evaluation: 1/17/2024    Procedure Summary     Date: 01/17/24 Room / Location: [place] [address] / [facility]    Anesthesia Start: 0829 Anesthesia Stop:     Procedure: EGD ESOPHAGOGASTRODUODENOSCOPY ULTRASOUND Diagnosis:       Pancreatic abnormality      (Pancreatic abnormality [Q45.3])    Surgeons: Gabriel Claros MD Responsible Provider: Hemant Faye APRN - CRNA    Anesthesia Type: MAC ASA Status: 2          Anesthesia Type: No value filed.    Luis Phase I: Luis Score: 10    Luis Phase II:      Anesthesia Post Evaluation    Patient location during evaluation: bedside  Level of consciousness: awake and alert  Pain score: 0  Airway patency: patent  Nausea & Vomiting: no nausea and no vomiting  Cardiovascular status: blood pressure returned to baseline  Respiratory status: acceptable  Hydration status: euvolemic  Pain management: adequate        No notable events documented.

## 2024-01-19 ENCOUNTER — TELEPHONE (OUTPATIENT)
Dept: CARDIOLOGY | Facility: CLINIC | Age: 69
End: 2024-01-19

## 2024-01-19 ENCOUNTER — OFFICE VISIT (OUTPATIENT)
Dept: CARDIOLOGY | Facility: CLINIC | Age: 69
End: 2024-01-19
Payer: MEDICARE

## 2024-01-19 VITALS
HEART RATE: 66 BPM | HEIGHT: 65 IN | SYSTOLIC BLOOD PRESSURE: 138 MMHG | BODY MASS INDEX: 28.32 KG/M2 | DIASTOLIC BLOOD PRESSURE: 78 MMHG | WEIGHT: 170 LBS

## 2024-01-19 DIAGNOSIS — G45.9 TRANSIENT CEREBRAL ISCHEMIA, UNSPECIFIED TYPE: ICD-10-CM

## 2024-01-19 DIAGNOSIS — E78.5 DYSLIPIDEMIA: ICD-10-CM

## 2024-01-19 DIAGNOSIS — R00.2 PALPITATIONS: ICD-10-CM

## 2024-01-19 DIAGNOSIS — Z78.9 NONSMOKER: ICD-10-CM

## 2024-01-19 DIAGNOSIS — I10 ESSENTIAL HYPERTENSION: Primary | ICD-10-CM

## 2024-01-19 DIAGNOSIS — G25.2 INTENTION TREMOR: ICD-10-CM

## 2024-01-19 NOTE — PROGRESS NOTES
Edie Oconnor  [id number]  1955  68 y.o.  female    Referring Provider: Collette Xiong DO    Reason for  Visit: Here for routine follow up  Cardiac workup test results as below     Subjective     Overall feels the same   No new events or complaints since last visit   Overall the patient feels no major change from baseline symptoms   Similar symptoms as during last visit       Chest pain both with exertion as well as at rest.  Feels episodes of chest pain occur no more often with exertion  Moderate substernal,   Pressure like   Chest pain non pleuritic  Chest pain non positional and non gustatory   Lasts less than 5 minutes  Sometimes wakes up with chest pain   Mostly chest pains at rest    Started more than 6 months ago  Occurs once or twice a week on the average but can be variable in frequency  Associated diaphoresis  Associated nausea  No radiation    Relieved with rest or spontaneously  Not positional    No change with intake of food or antacids  No change with breathing  Mild to moderate associated dyspnea    No similar chest pain episodes in the past    Strong family history of early coronary artery disease    Joint pain in small, medium and large joints     Intermittent palpitations, once every several days to several weeks lasting for less than 1 minute  Associated symptoms of dizziness, weakness and shortness of breath    Intermittent dizzy spells, no presyncope or syncope     Right had tremors now       History of present illness:  Edie Oconnor is a 68 y.o. yo female with obstructive sleep apnea who presents today for   Chief Complaint   Patient presents with    Hypertension     6 mo - loop recorder    Shortness of Breath   .    History  Past Medical History:   Diagnosis Date    Abnormal ECG 05/09/23    Abnormal    Anemia not sure    Arthritis     Claustrophobia     Coronary artery disease     Carotid artery    Depression     Diabetes mellitus Type 2- 2/2020    Diverticulitis     GERD  (gastroesophageal reflux disease)     H/O Bell's palsy     Right    Hiatal hernia     Hx of colonic polyp     Hyperlipidemia     Hypertension 2014    Controlled    Neuromuscular disorder     Neuropathy    Peptic ulceration     Peripheral vascular disease     PONV (postoperative nausea and vomiting)     Sleep apnea     Stroke TIA    TIA (transient ischemic attack)    ,   Past Surgical History:   Procedure Laterality Date    APPENDECTOMY      CARDIAC CATHETERIZATION      CHOLECYSTECTOMY      COLONOSCOPY  12/10/2012    diverticula of sigmoid, tubular adenoma    COLONOSCOPY N/A 02/24/2017    Tubular adenoma ascending colon, Diverticulosis repeat exam in 5 years    COLONOSCOPY N/A 05/07/2019    Procedure: COLONOSCOPY WITH ANESTHESIA;  Surgeon: Dom Parker MD;  Location: [facility] ENDOSCOPY;  Service: Gastroenterology    ELBOW OPEN REDUCTION INTERNAL FIXATION Left     ENDOSCOPY  08/08/2016    HH, [place] Grade B reflux esophagitis    HYSTERECTOMY      UPPER GASTROINTESTINAL ENDOSCOPY  08/08/2016    hiatla hernia, [place] Grade B reflux esophagitis   ,   Family History   Problem Relation Age of Onset    Diabetes Mother     Hypertension Mother     Stroke Mother         mild stroke    Cancer Father         lung    Heart disease Sister     Cancer Sister         Pancreatic cancer    Cancer Brother         pancreatic cancer    Colon polyps Brother     Colon cancer Neg Hx    ,   Social History     Tobacco Use    Smoking status: Never     Passive exposure: Never    Smokeless tobacco: Never   Vaping Use    Vaping Use: Never used   Substance Use Topics    Alcohol use: Yes     Alcohol/week: 1.0 standard drink of alcohol     Types: 1 Glasses of wine per week     Comment: drink occasional glass of wine-maybe 1 glass every 3 months    Drug use: No   ,     Medications  Current Outpatient Medications   Medication Sig Dispense Refill    colestipol (COLESTID) 1 g tablet Take 1 tablet by mouth 2 (Two) Times a Day.      diphenoxylate-atropine  "(LOMOTIL) 2.5-0.025 MG per tablet 1 tablet Daily.      doxycycline (PERIOSTAT) 20 MG tablet 1 tablet As Needed.      loperamide (IMODIUM) 2 MG capsule Take 1 capsule by mouth As Needed for Diarrhea.      MAGNESIUM PO Take  by mouth.      Multiple Vitamins-Minerals (MULTIVITAMIN WITH MINERALS) tablet tablet Take 1 tablet by mouth Daily.      pantoprazole (PROTONIX) 40 MG EC tablet Take 1 tablet by mouth Daily.      vitamin B-12 (CYANOCOBALAMIN) 500 MCG tablet Take 1 tablet by mouth Daily.      Zinc 30 MG capsule Take 1 capsule by mouth Daily.      cholecalciferol (VITAMIN D3) 25 MCG (1000 UT) tablet Take 1 tablet by mouth Daily. (Patient not taking: Reported on 1/19/2024)       No current facility-administered medications for this visit.       Allergies:  Sulfa antibiotics and Prochlorperazine    Review of Systems  Review of Systems   Constitutional: Negative.   HENT: Negative.     Eyes: Negative.    Cardiovascular:  Positive for chest pain, dyspnea on exertion and palpitations. Negative for claudication, cyanosis, irregular heartbeat, leg swelling, near-syncope, orthopnea, paroxysmal nocturnal dyspnea and syncope.   Respiratory: Negative.     Endocrine: Negative.    Hematologic/Lymphatic: Negative.    Skin: Negative.    Musculoskeletal:  Positive for arthritis.   Gastrointestinal:  Negative for anorexia.   Genitourinary: Negative.    Neurological: Negative.    Psychiatric/Behavioral: Negative.         Objective     Physical Exam:  /78   Pulse 66   Ht 165.1 cm (65\")   Wt 77.1 kg (170 lb)   BMI 28.29 kg/m²     Physical Exam  Constitutional:       Appearance: She is well-developed.   HENT:      Head: Normocephalic.   Neck:      Vascular: Normal carotid pulses. No carotid bruit or JVD.      Trachea: No tracheal tenderness or tracheal deviation.   Cardiovascular:      Rate and Rhythm: Regular rhythm.      Pulses: Normal pulses.      Heart sounds: Normal heart sounds.   Pulmonary:      Effort: Pulmonary effort " is normal.      Breath sounds: No stridor.   Abdominal:      Palpations: Abdomen is soft.   Musculoskeletal:      Cervical back: No edema.   Skin:     General: Skin is warm.   Neurological:      Mental Status: She is alert.      Cranial Nerves: No cranial nerve deficit.      Sensory: No sensory deficit.   Psychiatric:         Speech: Speech normal.         Behavior: Behavior normal.         Results Review:      US Carotid Bilateral [[id number]] (Order [id number])  Order  Status: Final result     Appointment Information    Display Notes    APPT AND PREP WILL BE GIVEN TO PT [facility]                  PACS Images     Radiology Images    Study Result    Narrative & Impression   History: Carotid occlusive disease     IMPRESSION:  Impression:  1. There is less than 50% stenosis of the right internal carotid artery.  2. There is less than 50% stenosis of the left internal carotid artery.  3. Antegrade flow is demonstrated in bilateral vertebral arteries.            Edie Oconnor  Holter Monitor >7 Days Up To 15 Days Hook-Up & Interp ([id number]) Clinic  Order# [id number]  Reading physician: Cash Seymour MD Ordering physician: Cash Seymour MD Study date: 23     Patient Information    Patient Name  Edie Oconnor MRN  [id number] Legal Sex  Female  (Age)  1955 (68 y.o.)     Interpretation Summary         Average HR: 75. Min HR: 43. Max HR: 140.     Entire report was reviewed.   The predominant rhythm noted during the testing period was sinus with rates as above while in sinus rhythm.     Rare supraventricular ectopics with an APC burden of: < 1%    Rare premature ventricular contractions with a PVC burden of: < 1%     No correlated arrhythmia  No significant pauses                  Conclusion:      Baseline rhythm was sinus.  Slowest rate of 43 bpm consisted of marked sinus bradycardia at 6:19 AM  No significant ectopy   2 runs of supraventricular tachycardia most rapid and longest 12  seconds at a maximum rate of 130 bpm and average rate of 122 bpm at 12:37 PM     Narrative & Impression   Reference CTA report as below    Cardiac CT angiography  5/26/2023     Impression:      Total calcium score (using TINAJERO calcium score calculator): 35.9  This is at the 62nd percentile for matched population using multi ethnic study of atherosclerosis calcium calculator        Coronary CT angiogram     No significant disease of left main coronary artery  Mid left interesting coronary artery has approximately a 40 to 50% nonobstructive stenosis  Normal diagonal branch  Focal calcification noted of mid left anterior descending coronary artery  No significant disease noted of left circumflex coronary artery  Normal obtuse marginal branches  Normal right coronary artery  Normal right posterior descending coronary artery  Normal right posterior lateral artery                 CT FFR report     Please refer to coronary CT angiography that is already reported and available for coronary anatomy  FFR CT analysis was performed on the original cardiac CT angiogram data set.  Diagrammatic representation of the FFR CT analysis is provided in a separate PDF document in the PACS.  This dictation was created using the PDF document and an interactive 3D model of the results.  3D model is not available in the EMR PACS.  Normal FFR range is more than 0.8     Indication for test:  Signs and symptoms consistent with coronary artery disease including shortness of breath and chest discomfort.  Increased pretest probability for coronary artery disease  Positive CTA impression with potentially flow-limiting coronary artery disease based on CTA report     Detailed findings:          Detailed findings with coronary flow modelling as referenced above      Left main: Normal  LAD: Normal  LCx: Normal  RCA: Normal       Results for orders placed during the hospital encounter of 06/13/23    Adult Transthoracic Echo Complete w/ Color, Spectral  and Contrast if necessary per protocol    Interpretation Summary    Left ventricular systolic function is normal. Calculated left ventricular EF = 61.7% Left ventricular ejection fraction appears to be 61 - 65%.    Left ventricular diastolic function was normal.    Estimated right ventricular systolic pressure from tricuspid regurgitation is normal (<35 mmHg).    There is moderate plaque in the ascending aorta present.    Abnormal global longitudinal LV strain (GLS) = -15.3%.          ____________________________________________________________________________________________________________________________________________  Health maintenance and recommendations    Low salt/ HTN/ Heart healthy carbohydrate restricted cardiac diet   The patient is advised to reduce or avoid caffeine or other cardiac stimulants.   Minimize or avoid  NSAID-type medications      Monitor for any signs of bleeding including red or dark stools. Fall precautions.   Advised staying uptodate with immunizations per established standard guidelines.    Offered to give patient  a copy of my notes     Questions were encouraged, asked and answered to the patient's  understanding and satisfaction. Questions if any regarding current medications and side effects, need for refills and importance of compliance to medications stressed.    Reviewed available prior notes, consults, prior visits, laboratory findings, radiology and cardiology relevant reports. Updated chart as applicable. I have reviewed the patient's medical history in detail and updated the computerized patient record as relevant.      Updated patient regarding any new or relevant abnormalities on review of records or any new findings on physical exam. Mentioned to patient about purpose of visit and desirable health short and long term goals and objectives.    Primary to monitor CBC CMP Lipid panel and TSH as  applicable    ___________________________________________________________________________________________________________________________________________   Procedures    Assessment & Plan   Diagnoses and all orders for this visit:    1. Essential hypertension (Primary)    2. Palpitations    3. Transient cerebral ischemia, unspecified type    4. Dyslipidemia    5. Nonsmoker    6. Intention tremor  -     Ambulatory Referral to Neurology          Plan    Patient expressed understanding  Encouraged and answered all questions   Discussed with the patient and all questioned fully answered. She will call me if any problems arise.   Discussed results of prior testing with patient :cardiac CTA and CT FFR as above, echo and outpatient cardiac telemetry and also carotid doppler     Monitor implantable loop recorder for cryptogenic stroke      Can use Kardia in addition and bring me the tracing next visit     Minor asymptomatic SVT for which no additional medical therapy is advised   Does not have any malignant arrhythmia or atrial fibrillation      Keep f/u with vascular surgery       Due to severe GI issues she does not want to take Aspirin 81 mg     Orders Placed This Encounter   Procedures    Ambulatory Referral to Neurology     Referral Priority:   Routine     Referral Type:   Consultation     Referral Reason:   Specialty Services Required     Requested Specialty:   Neurology     Number of Visits Requested:   1              Return in about 6 months (around 7/19/2024).

## 2024-01-19 NOTE — TELEPHONE ENCOUNTER
HUB CAN READ     Called to see if she was going to come to her appointment today because of the weather and if she was if she wanted to come in sooner per .

## 2024-02-02 ENCOUNTER — OFFICE VISIT (OUTPATIENT)
Dept: VASCULAR SURGERY | Facility: CLINIC | Age: 69
End: 2024-02-02
Payer: MEDICARE

## 2024-02-02 ENCOUNTER — HOSPITAL ENCOUNTER (OUTPATIENT)
Dept: ULTRASOUND IMAGING | Facility: HOSPITAL | Age: 69
Discharge: HOME | End: 2024-02-02
Payer: MEDICARE

## 2024-02-02 VITALS
HEART RATE: 84 BPM | WEIGHT: 169.8 LBS | SYSTOLIC BLOOD PRESSURE: 112 MMHG | BODY MASS INDEX: 28.29 KG/M2 | HEIGHT: 65 IN | DIASTOLIC BLOOD PRESSURE: 68 MMHG | OXYGEN SATURATION: 97 %

## 2024-02-02 DIAGNOSIS — I10 ESSENTIAL HYPERTENSION: ICD-10-CM

## 2024-02-02 DIAGNOSIS — I65.23 BILATERAL CAROTID ARTERY STENOSIS: ICD-10-CM

## 2024-02-02 DIAGNOSIS — E78.5 DYSLIPIDEMIA: ICD-10-CM

## 2024-02-02 DIAGNOSIS — I65.23 BILATERAL CAROTID ARTERY STENOSIS: Primary | ICD-10-CM

## 2024-02-02 PROCEDURE — 93880 EXTRACRANIAL BILAT STUDY: CPT

## 2024-02-02 PROCEDURE — 99214 OFFICE O/P EST MOD 30 MIN: CPT | Performed by: NURSE PRACTITIONER

## 2024-02-02 PROCEDURE — 1159F MED LIST DOCD IN RCRD: CPT | Performed by: NURSE PRACTITIONER

## 2024-02-02 PROCEDURE — 3078F DIAST BP <80 MM HG: CPT | Performed by: NURSE PRACTITIONER

## 2024-02-02 PROCEDURE — 3074F SYST BP LT 130 MM HG: CPT | Performed by: NURSE PRACTITIONER

## 2024-02-02 PROCEDURE — 1160F RVW MEDS BY RX/DR IN RCRD: CPT | Performed by: NURSE PRACTITIONER

## 2024-02-02 NOTE — PROGRESS NOTES
"02/02/2024       Collette Xiong,   [address]  [facility] [address]    Edie Oconnor  1955    Chief Complaint   Patient presents with    BILATERAL CAROTID ARTERY STENOSIS     TESTING THIS AM, NONSMOKER, HAS HAD HEADACHES ,DIZZINESS AND BLURRED VISION AT TIMES       Dear Collette Xiong,        HPI  I had the pleasure of seeing your patient Edie Oconnor in the office today.  As you recall, Edie Oconnor is a 68 y.o.  female who we are following for asymptomatic carotid occlusive disease.  Currently she is doing well and denies any strokelike symptoms.  She is no longer taking aspirin.  She does have previous history of TIA.  They do not recommend she continue aspirin due to stomach issues.  She has had 2 siblings die from pancreatic cancer and is being monitored closely for this.  She denies any other health changes since her last visit.  She did have noninvasive testing performed today, which I did review in office.    Review of Systems   Constitutional: Negative.    HENT: Negative.     Eyes: Negative.    Respiratory: Negative.     Cardiovascular: Negative.    Gastrointestinal: Negative.    Endocrine: Negative.    Genitourinary: Negative.    Musculoskeletal: Negative.    Skin: Negative.    Allergic/Immunologic: Negative.    Neurological:  Positive for dizziness.   Hematological: Negative.    Psychiatric/Behavioral: Negative.     All other systems reviewed and are negative.        /68   Pulse 84   Ht 165.1 cm (65\")   Wt 77 kg (169 lb 12.8 oz)   SpO2 97%   BMI 28.26 kg/m²   Physical Exam  Vitals and nursing note reviewed.   Constitutional:       General: She is not in acute distress.     Appearance: Normal appearance. She is well-developed and overweight. She is not diaphoretic.   HENT:      Head: Normocephalic and atraumatic.   Eyes:      General: No scleral icterus.     Pupils: Pupils are equal, round, and reactive to light.   Neck:      Thyroid: No thyromegaly. "      Vascular: No carotid bruit or JVD.   Cardiovascular:      Rate and Rhythm: Normal rate and regular rhythm.      Pulses: Normal pulses.      Heart sounds: Normal heart sounds and S2 normal. No murmur heard.     No friction rub. No gallop.   Pulmonary:      Effort: Pulmonary effort is normal.      Breath sounds: Normal breath sounds.   Abdominal:      General: Bowel sounds are normal.      Palpations: Abdomen is soft.   Musculoskeletal:         General: Normal range of motion.      Cervical back: Normal range of motion and neck supple.   Skin:     General: Skin is warm and dry.   Neurological:      Mental Status: She is alert and oriented to person, place, and time.      Cranial Nerves: No cranial nerve deficit.   Psychiatric:         Behavior: Behavior normal. Behavior is cooperative.         Thought Content: Thought content normal.         Judgment: Judgment normal.               Diagnostic Data:  US Carotid Bilateral    Result Date: 2/2/2024  Narrative: History: Carotid occlusive disease      Impression: Impression: 1. There is less than 50% stenosis of the right internal carotid artery. 2. There is less than 50% stenosis of the left internal carotid artery. 3. Antegrade flow is demonstrated in bilateral vertebral arteries.  Comments: Bilateral carotid vertebral arterial duplex scan was performed.  Grayscale imaging shows intimal thickening and calcified elements at the carotid bifurcation. The right internal carotid artery peak systolic velocity is 89.9 cm/sec. The end-diastolic velocity is 34.9 cm/sec. The right ICA/CCA ratio is approximately 1.2. These findings correlate with less than 50% stenosis of the right internal carotid artery.  Grayscale imaging shows intimal thickening and calcified elements at the carotid bifurcation. The left internal carotid artery peak systolic velocity is 86 cm/sec. The end-diastolic velocity is 39.4 cm/sec. The left ICA/CCA ratio is approximately 1.4. These findings  correlate with less than 50% stenosis of the left internal carotid artery.  Antegrade flow is demonstrated in bilateral vertebral arteries.   This report was signed and finalized on 2/2/2024 2:40 PM by Dr. Noah Treviño MD.        Patient Active Problem List   Diagnosis    Transient cerebral ischemia    LAN on CPAP    Dyslipidemia    Essential hypertension    Abnormal CT of the abdomen    Colitis    Nonsmoker    Carotid artery stenosis    CPAP (continuous positive airway pressure) dependence    Diarrhea    Dyspepsia    Gastric reflux syndrome    Injury of shoulder and upper arm    Open fracture of proximal phalanx of index finger    Pain in elbow    Restless leg syndrome    Palpitations         ICD-10-CM ICD-9-CM   1. Bilateral carotid artery stenosis  I65.23 433.10     433.30   2. Essential hypertension  I10 401.9   3. Dyslipidemia  E78.5 272.4         Plan: After thoroughly evaluating Edie Oconnor, I believe the best course of action is to remain conservative from vascular surgery standpoint.  Currently she is doing well and denies any strokelike symptoms.  I did review her testing which shows less than 50% carotid stenosis bilaterally.  We will see her back in 1 year with repeat noninvasive testing including a carotid duplex for continued surveillance.  I did discuss vascular risk factors as they pertain to the progression of vascular disease including controlling her hypertension and dyslipidemia.  Her blood pressure stable on her current medications.  Her dyslipidemia is monitored by her primary care provider.   The patient can continue taking their current medication regimen as previously planned.  This was all discussed in full with complete understanding.    Thank you for allowing me to participate in the care of your patient.  Please do not hesitate with any questions or concerns.  I will keep you aware of any further encounters with Edie Oconnor.        Sincerely yours,         Colleen  Noah, APRN

## 2024-02-02 NOTE — LETTER
February 5, 2024     Collette Xiong DO  [address]  [address]  [place] [address]    Patient: Edie Oconnor   YOB: 1955   Date of Visit: 2/2/2024     Dear Collette Xiong DO:       Thank you for referring Edie Oconnor to me for evaluation. Below are the relevant portions of my assessment and plan of care.    If you have questions, please do not hesitate to call me. I look forward to following Edie along with you.         Sincerely,        JONEL Mclean        CC: No Recipients    Colleen Zamora APRN  02/05/24 1533  Sign when Signing Visit  02/02/2024       Collette Xiong DO  [address]  [facility] [address]    Edie Oconnor  1955    Chief Complaint   Patient presents with   • BILATERAL CAROTID ARTERY STENOSIS     TESTING THIS AM, NONSMOKER, HAS HAD HEADACHES ,DIZZINESS AND BLURRED VISION AT TIMES       Dear Collette Xiong DO       HPI  I had the pleasure of seeing your patient Edie Oconnor in the office today.  As you recall, Edie Oconnor is a 68 y.o.  female who we are following for asymptomatic carotid occlusive disease.  Currently she is doing well and denies any strokelike symptoms.  She is no longer taking aspirin.  She does have previous history of TIA.  They do not recommend she continue aspirin due to stomach issues.  She has had 2 siblings die from pancreatic cancer and is being monitored closely for this.  She denies any other health changes since her last visit.  She did have noninvasive testing performed today, which I did review in office.    Review of Systems   Constitutional: Negative.    HENT: Negative.     Eyes: Negative.    Respiratory: Negative.     Cardiovascular: Negative.    Gastrointestinal: Negative.    Endocrine: Negative.    Genitourinary: Negative.    Musculoskeletal: Negative.    Skin: Negative.    Allergic/Immunologic: Negative.    Neurological:  Positive for dizziness.   Hematological:  "Negative.    Psychiatric/Behavioral: Negative.     All other systems reviewed and are negative.        /68   Pulse 84   Ht 165.1 cm (65\")   Wt 77 kg (169 lb 12.8 oz)   SpO2 97%   BMI 28.26 kg/m²   Physical Exam  Vitals and nursing note reviewed.   Constitutional:       General: She is not in acute distress.     Appearance: Normal appearance. She is well-developed and overweight. She is not diaphoretic.   HENT:      Head: Normocephalic and atraumatic.   Eyes:      General: No scleral icterus.     Pupils: Pupils are equal, round, and reactive to light.   Neck:      Thyroid: No thyromegaly.      Vascular: No carotid bruit or JVD.   Cardiovascular:      Rate and Rhythm: Normal rate and regular rhythm.      Pulses: Normal pulses.      Heart sounds: Normal heart sounds and S2 normal. No murmur heard.     No friction rub. No gallop.   Pulmonary:      Effort: Pulmonary effort is normal.      Breath sounds: Normal breath sounds.   Abdominal:      General: Bowel sounds are normal.      Palpations: Abdomen is soft.   Musculoskeletal:         General: Normal range of motion.      Cervical back: Normal range of motion and neck supple.   Skin:     General: Skin is warm and dry.   Neurological:      Mental Status: She is alert and oriented to person, place, and time.      Cranial Nerves: No cranial nerve deficit.   Psychiatric:         Behavior: Behavior normal. Behavior is cooperative.         Thought Content: Thought content normal.         Judgment: Judgment normal.               Diagnostic Data:  US Carotid Bilateral    Result Date: 2/2/2024  Narrative: History: Carotid occlusive disease      Impression: Impression: 1. There is less than 50% stenosis of the right internal carotid artery. 2. There is less than 50% stenosis of the left internal carotid artery. 3. Antegrade flow is demonstrated in bilateral vertebral arteries.  Comments: Bilateral carotid vertebral arterial duplex scan was performed.  Grayscale imaging " shows intimal thickening and calcified elements at the carotid bifurcation. The right internal carotid artery peak systolic velocity is 89.9 cm/sec. The end-diastolic velocity is 34.9 cm/sec. The right ICA/CCA ratio is approximately 1.2. These findings correlate with less than 50% stenosis of the right internal carotid artery.  Grayscale imaging shows intimal thickening and calcified elements at the carotid bifurcation. The left internal carotid artery peak systolic velocity is 86 cm/sec. The end-diastolic velocity is 39.4 cm/sec. The left ICA/CCA ratio is approximately 1.4. These findings correlate with less than 50% stenosis of the left internal carotid artery.  Antegrade flow is demonstrated in bilateral vertebral arteries.   This report was signed and finalized on 2/2/2024 2:40 PM by Dr. Noah Treviño MD.        Patient Active Problem List   Diagnosis   • Transient cerebral ischemia   • LAN on CPAP   • Dyslipidemia   • Essential hypertension   • Abnormal CT of the abdomen   • Colitis   • Nonsmoker   • Carotid artery stenosis   • CPAP (continuous positive airway pressure) dependence   • Diarrhea   • Dyspepsia   • Gastric reflux syndrome   • Injury of shoulder and upper arm   • Open fracture of proximal phalanx of index finger   • Pain in elbow   • Restless leg syndrome   • Palpitations         ICD-10-CM ICD-9-CM   1. Bilateral carotid artery stenosis  I65.23 433.10     433.30   2. Essential hypertension  I10 401.9   3. Dyslipidemia  E78.5 272.4         Plan: After thoroughly evaluating Edie Oconnor, I believe the best course of action is to remain conservative from vascular surgery standpoint.  Currently she is doing well and denies any strokelike symptoms.  I did review her testing which shows less than 50% carotid stenosis bilaterally.  We will see her back in 1 year with repeat noninvasive testing including a carotid duplex for continued surveillance.  I did discuss vascular risk factors as they pertain  to the progression of vascular disease including controlling her hypertension and dyslipidemia.  Her blood pressure stable on her current medications.  Her dyslipidemia is monitored by her primary care provider.   The patient can continue taking their current medication regimen as previously planned.  This was all discussed in full with complete understanding.    Thank you for allowing me to participate in the care of your patient.  Please do not hesitate with any questions or concerns.  I will keep you aware of any further encounters with Edie Oconnor.        Sincerely yours,         JONEL Mclean

## 2024-03-18 ENCOUNTER — OFFICE VISIT (OUTPATIENT)
Dept: NEUROLOGY | Facility: CLINIC | Age: 69
End: 2024-03-18
Payer: MEDICARE

## 2024-03-18 ENCOUNTER — PATIENT ROUNDING (BHMG ONLY) (OUTPATIENT)
Dept: NEUROLOGY | Facility: CLINIC | Age: 69
End: 2024-03-18
Payer: MEDICARE

## 2024-03-18 VITALS
WEIGHT: 169 LBS | HEIGHT: 65 IN | DIASTOLIC BLOOD PRESSURE: 80 MMHG | BODY MASS INDEX: 28.16 KG/M2 | RESPIRATION RATE: 18 BRPM | SYSTOLIC BLOOD PRESSURE: 130 MMHG | HEART RATE: 72 BPM

## 2024-03-18 DIAGNOSIS — R25.1 TREMOR OF RIGHT HAND: Primary | ICD-10-CM

## 2024-03-18 PROCEDURE — 3079F DIAST BP 80-89 MM HG: CPT

## 2024-03-18 PROCEDURE — 1160F RVW MEDS BY RX/DR IN RCRD: CPT

## 2024-03-18 PROCEDURE — 99214 OFFICE O/P EST MOD 30 MIN: CPT

## 2024-03-18 PROCEDURE — 3075F SYST BP GE 130 - 139MM HG: CPT

## 2024-03-18 PROCEDURE — 1159F MED LIST DOCD IN RCRD: CPT

## 2024-03-18 NOTE — PROGRESS NOTES
Neurology Consult Note    [facility]  [address], [address]  [place] [address]  Phone: [phone]  Fax: [phone]    Referring Provider:   Cash Seymour MD  Primary Care Provider:  Collette Xiong DO    Reason for Consult:  Intention Tremor  Subjective      Edie Oconnor presents to [facility]    History of Present Illness  69-year-old female referred to our clinic by Dr. Cash Seymour of [facility] for the evaluation of intention tremor.  Patient saw Dr. Seymour on 1/19/2024.  Patient presented for routine follow-up.  Patient had been seen for chest pain with exertion as well as at rest.  Lakshmi started approximately 6 months ago.  She did have associated diaphoresis as well as nausea.  This was happening approximately twice weekly.  Patient also noticed intermittent palpitations.  She also noticed right hand tremors.    Of note patient did follow in our office prior.  She was last seen in clinic in September 2020.  Reviewed that note shows patient presented for follow-up from 8 June 2020 visit at which time she had transient left facial numbness and weakness.  Patient was reported difficulty speaking.  As of that time she was having a episodic complicated migraine.  She was provided with samples of Nurtec.  She was seen as needed.    Patient presents today by herself.  She reports me a tremor involving her right arm.  She notes this most approximately 2 months ago.  She states she was playing badminton with her granddaughter and noticed her right upper arm starting to swell.  After that she has head tremor.  She does notice her tremor most when she is doing something such as drinking a cup of coffee, fixing her hair or getting dressed.  She has occasionally noticed it at rest.  She describes it as very mild.  Again this only affects her right arm.  She does endorse a family history of tremor involving her brother and mother.  She notes they were  never seen by neurology or worked up for the tremors.  For her tremor, she does not classified as disabling.    Currently she does work in the [employer] business.  She was involved in commercial cleaning several years ago.  She has no known exposures to chemicals however did work with chemicals.  She is  as her  passed away approximate 11 years ago when asleep.  She denies any head or neck traumas.  She does endorse a prior history of TIA and stroke.  In regards to caffeine intake, she reports she drinks 3 cups of coffee daily.  She does drink Sprite and Diet Coke as well as decaf tea.  She also notes she has neuropathy in her bilateral legs.  This is managed by her PCP.      Patient Active Problem List   Diagnosis    Transient cerebral ischemia    LAN on CPAP    Dyslipidemia    Essential hypertension    Abnormal CT of the abdomen    Colitis    Nonsmoker    Carotid artery stenosis    CPAP (continuous positive airway pressure) dependence    Diarrhea    Dyspepsia    Gastric reflux syndrome    Injury of shoulder and upper arm    Open fracture of proximal phalanx of index finger    Pain in elbow    Restless leg syndrome    Palpitations        Past Medical History:   Diagnosis Date    Abnormal ECG 05/09/23    Abnormal    Anemia not sure    Arthritis     Claustrophobia     Coronary artery disease     Carotid artery    Depression     Diabetes mellitus Type 2- 2/2020    Diverticulitis     GERD (gastroesophageal reflux disease)     H/O Bell's palsy     Right    Hiatal hernia     Hx of colonic polyp     Hyperlipidemia     Hypertension 2014    Controlled    Neuromuscular disorder     Neuropathy    Peptic ulceration     Peripheral vascular disease     PONV (postoperative nausea and vomiting)     Sleep apnea     Stroke TIA    TIA (transient ischemic attack)         Social History     Socioeconomic History    Marital status:    Tobacco Use    Smoking status: Never     Passive exposure: Never    Smokeless  "tobacco: Never   Vaping Use    Vaping status: Never Used   Substance and Sexual Activity    Alcohol use: Yes     Alcohol/week: 1.0 standard drink of alcohol     Types: 1 Glasses of wine per week     Comment: drink occasional glass of wine-maybe 1 glass every 3 months    Drug use: No    Sexual activity: Not Currently     Partners: Male     Birth control/protection: None        Allergies   Allergen Reactions    Sulfa Antibiotics Anaphylaxis    Prochlorperazine GI Intolerance          Current Outpatient Medications:     cholecalciferol (VITAMIN D3) 25 MCG (1000 UT) tablet, Take 1 tablet by mouth Daily., Disp: , Rfl:     colestipol (COLESTID) 1 g tablet, Take 1 tablet by mouth 2 (Two) Times a Day., Disp: , Rfl:     diphenoxylate-atropine (LOMOTIL) 2.5-0.025 MG per tablet, 1 tablet Daily., Disp: , Rfl:     doxycycline (PERIOSTAT) 20 MG tablet, 1 tablet As Needed., Disp: , Rfl:     loperamide (IMODIUM) 2 MG capsule, Take 1 capsule by mouth As Needed for Diarrhea., Disp: , Rfl:     MAGNESIUM PO, Take  by mouth., Disp: , Rfl:     Multiple Vitamins-Minerals (MULTIVITAMIN WITH MINERALS) tablet tablet, Take 1 tablet by mouth Daily., Disp: , Rfl:     pantoprazole (PROTONIX) 40 MG EC tablet, Take 1 tablet by mouth Daily., Disp: , Rfl:     vitamin B-12 (CYANOCOBALAMIN) 500 MCG tablet, Take 1 tablet by mouth Daily., Disp: , Rfl:     Zinc 30 MG capsule, Take 1 capsule by mouth Daily., Disp: , Rfl:        Objective   Vital Signs:   /80 (BP Location: Left arm, Patient Position: Sitting)   Pulse 72   Resp 18   Ht 165.1 cm (65\")   Wt 76.7 kg (169 lb)   BMI 28.12 kg/m²       Physical Exam  Vitals and nursing note reviewed.   Constitutional:       Appearance: Normal appearance.   HENT:      Head: Normocephalic.   Eyes:      General: Lids are normal.      Extraocular Movements: Extraocular movements intact.      Pupils: Pupils are equal, round, and reactive to light.   Pulmonary:      Effort: Pulmonary effort is normal. No " respiratory distress.   Skin:     General: Skin is warm and dry.   Neurological:      Mental Status: She is alert.      Motor: Motor strength is normal.     Coordination: Romberg sign negative.      Deep Tendon Reflexes:      Reflex Scores:       Tricep reflexes are 2+ on the right side and 2+ on the left side.       Bicep reflexes are 2+ on the right side and 2+ on the left side.       Patellar reflexes are 1+ on the right side and 1+ on the left side.       Achilles reflexes are 1+ on the right side and 1+ on the left side.  Psychiatric:         Speech: Speech normal.        Neurological Exam  Mental Status  Alert. Oriented to person, place, time and situation. Speech is normal. Language is fluent with no aphasia.    Cranial Nerves  CN II: Visual fields full to confrontation.  CN III, IV, VI: Extraocular movements intact bilaterally. Normal lids and orbits bilaterally. Pupils equal round and reactive to light bilaterally.  CN V: Facial sensation is normal.  CN VII: Full and symmetric facial movement.  CN IX, X: Palate elevates symmetrically. Normal gag reflex.  CN XI: Shoulder shrug strength is normal.  CN XII: Tongue midline without atrophy or fasciculations.    Motor  Normal muscle bulk throughout. No fasciculations present. Normal muscle tone. The following abnormal movements were seen: Very mild, low-frequency tremor noted against gravity.  This involved the right hand only.  No resting tremor noted.  No tremor with distraction.  No bradykinesia.  No rigidity.  .   Strength is 5/5 throughout all four extremities.    Sensory  Light touch is normal in upper and lower extremities.     Reflexes                                            Right                      Left  Biceps                                 2+                         2+  Triceps                                2+                         2+  Patellar                                1+                         1+  Achilles                                 1+                         1+    Coordination  Right: Finger-to-nose normal. Heel-to-shin normal.Left: Finger-to-nose normal. Heel-to-shin normal.    Gait  Casual gait is normal including stance, stride, and arm swing. Romberg is absent. Normal pull test.  No shuffling gait.  No en bloc turning..      Result Review :   The following data was reviewed by: JONEL Turk on 03/18/2024:       Progress Notes by Tai Mcgovern PA-C (09/11/2020 10:45)   Progress Notes by Cash Seymour MD (01/19/2024 10:30)   Progress Notes by Colleen Zamora APRN (02/02/2024 09:15)     MRI Brain With & Without Contrast (07/01/2020 10:00)   Study Result    Narrative & Impression   EXAM: MR BRAIN WITHOUT AND WITH IV CONTRAST 7/1/2020     COMPARISON: MRI brain dated 3/14/2016      HISTORY: 65 years-old Female. R47.89; R47.89-Other speech disturbances;  R20.2-Paresthesia of skin     TECHNIQUE:   Routine pulse sequences were obtained of the brain before and after the  administration of IV contrast.      REPORT:   The ventricles and cerebrospinal fluid spaces are normal in size and  configuration for the patient's age. There is no evidence of mass-effect  or midline shift. No reduced diffusivity is demonstrated. Mild chronic  microvascular changes. A more linear focus of gliosis in the T2/flair  sequence (image 20, series 401) in the posterior cingulate gyrus may  reflect a remote lacunar infarct versus a more focal area of chronic  microvascular changes. No abnormally enhancing lesions are  identified.The brainstem, sella, pituitary, cerebellum are unremarkable.  The basal cisterns are preserved.      No acute osseous lesion. Thinning of the lenses, reflecting cataract  versus prior cataract surgery.     The flow voids are preserved. Dural sinuses are patent.       Mastoid air cells are clear. The paranasal sinuses are free of  obstructive mucosal disease.      IMPRESSION:  1.  No acute intracranial abnormalities.  2.  Mild  chronic microvascular changes.  3.  Remote right cingulate gyrus lacunar infarct versus more focal area  of chronic microvascular changes.  This report was finalized on 07/01/2020 10:24 by Dr. Katie Vega MD.                  Impression:  Edie Oconnor is a 69 y.o. female who presents for the evaluation of tremor.  Her tremor does have characteristics of an essential tremor.  Do not see any signs of Parkinson's disease in her.  Specifically she has no resting tremor, no bradykinesia, no rigidity or abnormal gait.  She does note her tremor is worse when she is trying to do something.  Sounds more consistent with an essential tremor.  We did discuss preventative treatment to include propranolol versus primidone.  Patient reports her tremor is not disabling and she would like to hold off on medications.  This is very reasonable.    Diagnoses and all orders for this visit:    1. Tremor of right hand (Primary)        Plan:  Consider propranolol versus primidone in the future for tremor  Patient to contact her office if tremor worsens  Follow-up with PCP as scheduled  Follow-up in office with our clinic in 1 year    The patient and I have discussed the plan of care and she is in full agreement at this time.     Follow Up   Return in about 1 year (around 3/18/2025) for tremor.            JONEL Turk  03/18/24  09:33 CDT

## 2024-03-18 NOTE — PROGRESS NOTES
March 18, 2024        My name is Tran Zamora LPN      I am  with [facility]  [address]  [address]  [place] [address] [phone].    Before we get started may I verify your date of birth? 1955    I am officially welcoming you to our practice and ask about your recent visit. Is this a good time to talk? yes    Tell me about your visit with us. What things went well?  Patient states her visit was wonderful and she has no complaints.        We're always looking for ways to make our patients' experiences even better. Do you have recommendations on ways we may improve?  no    Overall were you satisfied with your first visit to our practice? yes       I appreciate you taking the time to speak with me today. Is there anything else I can do for you? no      Thank you, and have a great day.      
Never

## 2024-04-18 RX ORDER — DICYCLOMINE HCL 20 MG
20 TABLET ORAL EVERY 6 HOURS PRN
Qty: 120 TABLET | Refills: 5 | Status: SHIPPED | OUTPATIENT
Start: 2024-04-18

## 2024-06-20 ENCOUNTER — TELEPHONE (OUTPATIENT)
Dept: GASTROENTEROLOGY | Facility: CLINIC | Age: 69
End: 2024-06-20
Payer: MEDICARE

## 2024-09-25 ENCOUNTER — OFFICE VISIT (OUTPATIENT)
Dept: NEUROLOGY | Age: 69
End: 2024-09-25
Payer: MEDICARE

## 2024-09-25 VITALS
BODY MASS INDEX: 27.66 KG/M2 | WEIGHT: 166 LBS | SYSTOLIC BLOOD PRESSURE: 117 MMHG | HEART RATE: 56 BPM | DIASTOLIC BLOOD PRESSURE: 77 MMHG | OXYGEN SATURATION: 98 % | HEIGHT: 65 IN

## 2024-09-25 DIAGNOSIS — Z99.89 CPAP (CONTINUOUS POSITIVE AIRWAY PRESSURE) DEPENDENCE: ICD-10-CM

## 2024-09-25 DIAGNOSIS — G25.81 RESTLESS LEG SYNDROME: ICD-10-CM

## 2024-09-25 DIAGNOSIS — G47.33 OBSTRUCTIVE SLEEP APNEA: Primary | ICD-10-CM

## 2024-09-25 DIAGNOSIS — G47.00 INSOMNIA, UNSPECIFIED TYPE: ICD-10-CM

## 2024-09-25 DIAGNOSIS — G47.9 SLEEP DISTURBANCE: ICD-10-CM

## 2024-09-25 PROCEDURE — 3017F COLORECTAL CA SCREEN DOC REV: CPT | Performed by: PHYSICIAN ASSISTANT

## 2024-09-25 PROCEDURE — G8417 CALC BMI ABV UP PARAM F/U: HCPCS | Performed by: PHYSICIAN ASSISTANT

## 2024-09-25 PROCEDURE — 1123F ACP DISCUSS/DSCN MKR DOCD: CPT | Performed by: PHYSICIAN ASSISTANT

## 2024-09-25 PROCEDURE — G8428 CUR MEDS NOT DOCUMENT: HCPCS | Performed by: PHYSICIAN ASSISTANT

## 2024-09-25 PROCEDURE — 1090F PRES/ABSN URINE INCON ASSESS: CPT | Performed by: PHYSICIAN ASSISTANT

## 2024-09-25 PROCEDURE — 1036F TOBACCO NON-USER: CPT | Performed by: PHYSICIAN ASSISTANT

## 2024-09-25 PROCEDURE — 99214 OFFICE O/P EST MOD 30 MIN: CPT | Performed by: PHYSICIAN ASSISTANT

## 2024-09-25 PROCEDURE — G8400 PT W/DXA NO RESULTS DOC: HCPCS | Performed by: PHYSICIAN ASSISTANT

## 2024-10-01 ENCOUNTER — TELEPHONE (OUTPATIENT)
Dept: NEUROLOGY | Age: 69
End: 2024-10-01

## 2024-10-01 NOTE — TELEPHONE ENCOUNTER
Please let pt know that I reviewed the compliance report. It looks great. Very good control of the MELVIN with a low residual AHI. Continue present care. We did discuss referral to Dr. Nancy Mahajan for consultation for a dental device for MELVIN and she was going to consider the referral and let us know.

## 2024-10-02 NOTE — TELEPHONE ENCOUNTER
Spoke with patient, she is aware of Marine's message. Patient does not want to be referred for the dental device.

## 2024-12-18 DIAGNOSIS — F40.240 CLAUSTROPHOBIA: ICD-10-CM

## 2024-12-18 DIAGNOSIS — R10.9 ACUTE ABDOMINAL PAIN: ICD-10-CM

## 2024-12-18 DIAGNOSIS — Z91.89 AT HIGH RISK FOR PANCREATIC CANCER: ICD-10-CM

## 2024-12-18 DIAGNOSIS — K85.90 HEREDITARY PANCREATITIS: Primary | ICD-10-CM

## 2024-12-18 DIAGNOSIS — Z80.0 FAMILY HISTORY OF PANCREATIC CANCER: ICD-10-CM

## 2024-12-18 DIAGNOSIS — R19.7 INTERMITTENT DIARRHEA: ICD-10-CM

## 2025-01-13 DIAGNOSIS — Z80.0 FAMILY HISTORY OF PANCREATIC CANCER: ICD-10-CM

## 2025-01-13 DIAGNOSIS — K85.90 HEREDITARY PANCREATITIS: ICD-10-CM

## 2025-01-13 DIAGNOSIS — R19.7 INTERMITTENT DIARRHEA: ICD-10-CM

## 2025-01-13 DIAGNOSIS — Z91.89 AT HIGH RISK FOR PANCREATIC CANCER: ICD-10-CM

## 2025-01-13 DIAGNOSIS — F40.240 CLAUSTROPHOBIA: ICD-10-CM

## 2025-01-13 DIAGNOSIS — R10.9 ACUTE ABDOMINAL PAIN: ICD-10-CM

## 2025-07-05 LAB
MDC_IDC_PG_IMPLANT_DTM: NORMAL
MDC_IDC_PG_MFG: NORMAL
MDC_IDC_PG_MODEL: NORMAL
MDC_IDC_PG_SERIAL: NORMAL
MDC_IDC_PG_TYPE: NORMAL
MDC_IDC_SESS_DTM: NORMAL
MDC_IDC_SESS_TYPE: NORMAL

## 2025-08-11 LAB
MDC_IDC_PG_IMPLANT_DTM: NORMAL
MDC_IDC_PG_MFG: NORMAL
MDC_IDC_PG_MODEL: NORMAL
MDC_IDC_PG_SERIAL: NORMAL
MDC_IDC_PG_TYPE: NORMAL
MDC_IDC_SESS_DTM: NORMAL
MDC_IDC_SESS_TYPE: NORMAL

## (undated) DEVICE — FRCP BIOP COLD ENDOJAW ALLGTR W/NDL 2.8X2300MM BLU

## (undated) DEVICE — ENDOGATOR AUXILIARY WATER JET CONNECTOR: Brand: ENDOGATOR

## (undated) DEVICE — CUFF,BP,DISP,1 TUBE,ADULT,HP: Brand: MEDLINE

## (undated) DEVICE — Device: Brand: DEFENDO AIR/WATER/SUCTION AND BIOPSY VALVE

## (undated) DEVICE — THE CHANNEL CLEANING BRUSH IS A NYLON FLEXI BRUSH ATTACHED TO A FLEXIBLE PLASTIC SHEATH DESIGNED TO SAFELY REMOVE DEBRIS FROM FLEXIBLE ENDOSCOPES.

## (undated) DEVICE — SENSR O2 OXIMAX FNGR A/ 18IN NONSTR

## (undated) DEVICE — TBG SMPL FLTR LINE NASL 02/C02 A/ BX/100

## (undated) DEVICE — MASK,OXYGEN,MED CONC,ADLT,7' TUB, UC: Brand: PENDING

## (undated) DEVICE — ENDO KIT,LOURDES HOSPITAL: Brand: MEDLINE INDUSTRIES, INC.

## (undated) DEVICE — MSK O2 MD CONCENTR A/ LF 7FT 1P/U

## (undated) DEVICE — SNAR POLYP SENSATION MICRO OVL 13 240X40

## (undated) DEVICE — YANKAUER,BULB TIP WITH VENT: Brand: ARGYLE

## (undated) DEVICE — FORCEPS BX 240CM 2.4MM L NDL RAD JAW 4 M00513334